# Patient Record
Sex: MALE | Race: WHITE | NOT HISPANIC OR LATINO | Employment: OTHER | URBAN - METROPOLITAN AREA
[De-identification: names, ages, dates, MRNs, and addresses within clinical notes are randomized per-mention and may not be internally consistent; named-entity substitution may affect disease eponyms.]

---

## 2023-03-11 PROBLEM — E88.810 INSULIN RESISTANCE SYNDROME: Status: ACTIVE | Noted: 2023-03-11

## 2023-03-11 PROBLEM — E55.9 VITAMIN D DEFICIENCY: Status: ACTIVE | Noted: 2023-03-11

## 2023-03-11 PROBLEM — F39 MOOD DISORDER (CMS-HCC): Status: ACTIVE | Noted: 2023-03-11

## 2023-03-11 PROBLEM — G40.909 SEIZURE DISORDER (MULTI): Status: ACTIVE | Noted: 2023-03-11

## 2023-03-11 PROBLEM — F79 ACQUIRED INTELLECTUAL DISABILITY: Status: ACTIVE | Noted: 2023-03-11

## 2023-03-11 PROBLEM — Z93.2 ILEOSTOMY IN PLACE (MULTI): Status: ACTIVE | Noted: 2023-03-11

## 2023-03-11 PROBLEM — K59.2 NEUROGENIC BOWEL: Status: ACTIVE | Noted: 2023-03-11

## 2023-03-11 PROBLEM — R53.81 PHYSICAL DEBILITY: Status: ACTIVE | Noted: 2023-03-11

## 2023-03-11 PROBLEM — F29: Status: ACTIVE | Noted: 2023-03-11

## 2023-03-11 PROBLEM — Q07.9: Status: ACTIVE | Noted: 2023-03-11

## 2023-03-11 PROBLEM — Z93.59 SUPRAPUBIC CATHETER (MULTI): Status: ACTIVE | Noted: 2023-03-11

## 2023-03-11 PROBLEM — E66.9 OBESITY: Status: ACTIVE | Noted: 2023-03-11

## 2023-03-11 PROBLEM — K02.9 COMPLEX DENTAL CARIES: Status: ACTIVE | Noted: 2023-03-11

## 2023-03-11 PROBLEM — N31.9 NEUROGENIC BLADDER: Status: ACTIVE | Noted: 2023-03-11

## 2023-03-11 PROBLEM — F41.9 ANXIETY: Status: ACTIVE | Noted: 2023-03-11

## 2023-03-21 ENCOUNTER — NURSING HOME VISIT (OUTPATIENT)
Dept: POST ACUTE CARE | Facility: EXTERNAL LOCATION | Age: 53
End: 2023-03-21
Payer: MEDICARE

## 2023-03-21 DIAGNOSIS — G40.909 SEIZURE DISORDER (MULTI): ICD-10-CM

## 2023-03-21 DIAGNOSIS — F39 MOOD DISORDER (CMS-HCC): ICD-10-CM

## 2023-03-21 DIAGNOSIS — N39.0 URINARY TRACT INFECTION ASSOCIATED WITH CATHETERIZATION OF URINARY TRACT, UNSPECIFIED INDWELLING URINARY CATHETER TYPE, SUBSEQUENT ENCOUNTER: Primary | ICD-10-CM

## 2023-03-21 DIAGNOSIS — T83.511D URINARY TRACT INFECTION ASSOCIATED WITH CATHETERIZATION OF URINARY TRACT, UNSPECIFIED INDWELLING URINARY CATHETER TYPE, SUBSEQUENT ENCOUNTER: Primary | ICD-10-CM

## 2023-03-21 DIAGNOSIS — N31.9 NEUROGENIC BLADDER: ICD-10-CM

## 2023-03-21 DIAGNOSIS — F29 UNSP PSYCHOSIS NOT DUE TO A SUBSTANCE OR KNOWN PHYSIOL COND (MULTI): ICD-10-CM

## 2023-03-21 PROBLEM — T83.511A URINARY TRACT INFECTION ASSOCIATED WITH CATHETERIZATION OF URINARY TRACT (CMS-HCC): Status: ACTIVE | Noted: 2023-03-21

## 2023-03-21 PROCEDURE — 99309 SBSQ NF CARE MODERATE MDM 30: CPT | Performed by: NURSE PRACTITIONER

## 2023-03-21 ASSESSMENT — ENCOUNTER SYMPTOMS
DIFFICULTY URINATING: 0
SHORTNESS OF BREATH: 0
COUGH: 0
FEVER: 0
CONSTIPATION: 0
PALPITATIONS: 0
FATIGUE: 0
NAUSEA: 0
VOMITING: 0
CHILLS: 0
ABDOMINAL PAIN: 0
DIARRHEA: 0

## 2023-03-21 NOTE — LETTER
Patient: Nato Downs  : 1970    Encounter Date: 2023    Subjective  Nato Downs is a 52 y.o. male Here for routine monthly visit and follow up for UTI.   HPI Multiple health problems have been reviewed.  The course has been unchanged.  The patient  is moderately confused.   There are no family members present during time of visit.    he  denies any complaints when asked.  Eating and drinking well.   No concerns per staff.   Grzegorz was having increased behaviors and labs and urine were checked.   Keppra level was elevated, keppra dose was decreased.    Urine showed proteus and klebsiella, both organisms sensitive to cipro and this was started.  Per staff the behaviors have resolved since starting cipro.       Review of Systems   Constitutional:  Negative for chills, fatigue and fever.   Respiratory:  Negative for cough and shortness of breath.    Cardiovascular:  Negative for chest pain and palpitations.   Gastrointestinal:  Negative for abdominal pain, constipation, diarrhea, nausea and vomiting.   Genitourinary:  Negative for difficulty urinating.       Objective  There were no vitals taken for this visit.    Physical Exam  Constitutional:       General: He is not in acute distress.     Appearance: He is obese.   HENT:      Head: Normocephalic and atraumatic.   Eyes:      Conjunctiva/sclera: Conjunctivae normal.   Cardiovascular:      Rate and Rhythm: Normal rate and regular rhythm.   Pulmonary:      Effort: Pulmonary effort is normal. No respiratory distress.      Breath sounds: Normal breath sounds.   Abdominal:      General: Bowel sounds are normal. There is no distension.      Palpations: Abdomen is soft.      Tenderness: There is no abdominal tenderness.      Comments: Ostomy draining liquid brown   Genitourinary:     Comments: Suprapubic cath draining clear yellow  Musculoskeletal:         General: Normal range of motion.      Right lower leg: No edema.      Left lower leg: No edema.    Skin:     General: Skin is warm and dry.   Neurological:      General: No focal deficit present.      Mental Status: He is alert. Mental status is at baseline.   Psychiatric:         Mood and Affect: Mood normal.         Behavior: Behavior normal.         Assessment/Plan  Problem List Items Addressed This Visit          Nervous    Seizure disorder (CMS/[facility])     no known recent seizures.  Staff to monitor and notify for any seizure activity              Genitourinary    Neurogenic bladder     Suprapubic cath.           Urinary tract infection associated with catheterization of urinary tract (CMS/[facility]) - Primary     Klebsiella and proteus.  Sensitive to cipro.  Urien was checked due to increased behaviors, since cipro has started per staff the behaviors have resolved.  Staff to monitor and notify for any recurrence of symptoms.              Other    Mood disorder (CMS/[facility])     No recurrence of behaviors.          Unsp psychosis not due to a substance or known physiol cond (CMS/[facility])   labs/meds/orders reviewed  staff to monitor and notify for any changes.  Continue with cipro to complete course  Keppra level to be drawn 3/27  Next full labs 9/23 and prn      Electronically Signed By: CHARLA Larios-CNP   3/21/23  1:54 PM

## 2023-03-21 NOTE — PROGRESS NOTES
Subjective   Nato Downs is a 52 y.o. male Here for routine monthly visit and follow up for UTI.   HPI Multiple health problems have been reviewed.  The course has been unchanged.  The patient  is moderately confused.   There are no family members present during time of visit.    he  denies any complaints when asked.  Eating and drinking well.   No concerns per staff.   Grzegorz was having increased behaviors and labs and urine were checked.   Keppra level was elevated, keppra dose was decreased.    Urine showed proteus and klebsiella, both organisms sensitive to cipro and this was started.  Per staff the behaviors have resolved since starting cipro.       Review of Systems   Constitutional:  Negative for chills, fatigue and fever.   Respiratory:  Negative for cough and shortness of breath.    Cardiovascular:  Negative for chest pain and palpitations.   Gastrointestinal:  Negative for abdominal pain, constipation, diarrhea, nausea and vomiting.   Genitourinary:  Negative for difficulty urinating.       Objective   There were no vitals taken for this visit.    Physical Exam  Constitutional:       General: He is not in acute distress.     Appearance: He is obese.   HENT:      Head: Normocephalic and atraumatic.   Eyes:      Conjunctiva/sclera: Conjunctivae normal.   Cardiovascular:      Rate and Rhythm: Normal rate and regular rhythm.   Pulmonary:      Effort: Pulmonary effort is normal. No respiratory distress.      Breath sounds: Normal breath sounds.   Abdominal:      General: Bowel sounds are normal. There is no distension.      Palpations: Abdomen is soft.      Tenderness: There is no abdominal tenderness.      Comments: Ostomy draining liquid brown   Genitourinary:     Comments: Suprapubic cath draining clear yellow  Musculoskeletal:         General: Normal range of motion.      Right lower leg: No edema.      Left lower leg: No edema.   Skin:     General: Skin is warm and dry.   Neurological:      General: No  focal deficit present.      Mental Status: He is alert. Mental status is at baseline.   Psychiatric:         Mood and Affect: Mood normal.         Behavior: Behavior normal.         Assessment/Plan   Problem List Items Addressed This Visit          Nervous    Seizure disorder (CMS/[facility])     no known recent seizures.  Staff to monitor and notify for any seizure activity              Genitourinary    Neurogenic bladder     Suprapubic cath.           Urinary tract infection associated with catheterization of urinary tract (CMS/[facility]) - Primary     Klebsiella and proteus.  Sensitive to cipro.  Urien was checked due to increased behaviors, since cipro has started per staff the behaviors have resolved.  Staff to monitor and notify for any recurrence of symptoms.              Other    Mood disorder (CMS/[facility])     No recurrence of behaviors.          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  Continue with cipro to complete course  Keppra level to be drawn 3/27  Next full labs 9/23 and prn

## 2023-03-21 NOTE — ASSESSMENT & PLAN NOTE
Klebsiella and proteus.  Sensitive to cipro.  Urien was checked due to increased behaviors, since cipro has started per staff the behaviors have resolved.  Staff to monitor and notify for any recurrence of symptoms.

## 2023-04-21 ENCOUNTER — NURSING HOME VISIT (OUTPATIENT)
Dept: POST ACUTE CARE | Facility: EXTERNAL LOCATION | Age: 53
End: 2023-04-21
Payer: MEDICARE

## 2023-04-21 VITALS
HEART RATE: 92 BPM | RESPIRATION RATE: 18 BRPM | OXYGEN SATURATION: 96 % | TEMPERATURE: 98.2 F | WEIGHT: 273 LBS | DIASTOLIC BLOOD PRESSURE: 74 MMHG | SYSTOLIC BLOOD PRESSURE: 130 MMHG

## 2023-04-21 DIAGNOSIS — F79 ACQUIRED INTELLECTUAL DISABILITY: ICD-10-CM

## 2023-04-21 DIAGNOSIS — G40.909 SEIZURE DISORDER (MULTI): ICD-10-CM

## 2023-04-21 DIAGNOSIS — E66.9 OBESITY, UNSPECIFIED CLASSIFICATION, UNSPECIFIED OBESITY TYPE, UNSPECIFIED WHETHER SERIOUS COMORBIDITY PRESENT: ICD-10-CM

## 2023-04-21 DIAGNOSIS — N39.0 URINARY TRACT INFECTION ASSOCIATED WITH CATHETERIZATION OF URINARY TRACT, UNSPECIFIED INDWELLING URINARY CATHETER TYPE, SUBSEQUENT ENCOUNTER: Primary | ICD-10-CM

## 2023-04-21 DIAGNOSIS — Q07.9 CONGENITAL ENCEPHALOPATHY (MULTI): ICD-10-CM

## 2023-04-21 DIAGNOSIS — T83.511D URINARY TRACT INFECTION ASSOCIATED WITH CATHETERIZATION OF URINARY TRACT, UNSPECIFIED INDWELLING URINARY CATHETER TYPE, SUBSEQUENT ENCOUNTER: Primary | ICD-10-CM

## 2023-04-21 DIAGNOSIS — R53.81 PHYSICAL DEBILITY: ICD-10-CM

## 2023-04-21 PROCEDURE — 99309 SBSQ NF CARE MODERATE MDM 30: CPT | Performed by: INTERNAL MEDICINE

## 2023-04-21 NOTE — PROGRESS NOTES
Subjective   Patient ID: Nato Downs is a 52 y.o. male who is long term resident being seen and evaluated for multiple medical problems.    HPI   This 52-year-old male patient is sitting up in the dining room watching television today.  He has no complaints of pain or shortness of breath.  He was treated with Cipro for polymicrobial urinary tract infection in March 2023.  The patient is also had his Keppra dose decreased due to elevated levels noted on lab work.  No seizure has been reported or appreciated.  Nursing has no new adverse events to her.    Current high risk medication:  Clonazepam  Ditropan  Keppra  Risperidone  Trileptal    Laboratory examination from March 2023:  Keppra 27 which is decreased from 58 on March 20, 2023  Potassium 4.1  Bicarbonate 27  Creatinine 1.0  Hemoglobin 12.4  Trileptal 25    Review of Systems   Constitutional:  Negative for chills, diaphoresis and fever.   Respiratory:  Negative for cough and shortness of breath.    Cardiovascular:  Negative for chest pain and leg swelling.   Gastrointestinal:  Negative for constipation, diarrhea, nausea and vomiting.   Musculoskeletal:  Negative for joint swelling and myalgias.       Objective   /74   Pulse 92   Temp 36.8 °C (98.2 °F)   Resp 18   Wt 124 kg (273 lb)   SpO2 96%     Physical Exam  Vitals reviewed.   Constitutional:       General: He is not in acute distress.     Appearance: He is obese. He is not ill-appearing.   Cardiovascular:      Rate and Rhythm: Normal rate and regular rhythm.      Pulses: Normal pulses.      Heart sounds:      No gallop.   Pulmonary:      Breath sounds: Normal breath sounds. No wheezing, rhonchi or rales.   Abdominal:      General: Abdomen is flat. Bowel sounds are normal.      Palpations: Abdomen is soft.      Tenderness: There is no guarding or rebound.   Musculoskeletal:      Right lower leg: No edema.      Left lower leg: No edema.   Neurological:      Mental Status: Mental status is at  baseline.      Comments: The patient is developmentally delayed, but in a stable and baseline state at this time.         Assessment/Plan   Problem List Items Addressed This Visit          Nervous    Congenital encephalopathy (CMS/HCC)    Seizure disorder (CMS/HCC)       Genitourinary    Urinary tract infection associated with catheterization of urinary tract (CMS/HCC) - Primary       Endocrine/Metabolic    Obesity       Other    Acquired intellectual disability    Physical debility     A.  We will continue with restorative and supportive care as the patient tolerates    B.  We will monitor the patient for ongoing signs and symptoms of urinary tract infection given the patient's suprapubic catheter    C.  Laboratory examinations will next be due in September 2023 or as needed    E.  The patient's prognosis is guarded.

## 2023-04-21 NOTE — LETTER
Patient: Nato Downs  : 1970    Encounter Date: 2023    Subjective  Patient ID: Nato Downs is a 52 y.o. male who is long term resident being seen and evaluated for multiple medical problems.    HPI   This 52-year-old male patient is sitting up in the dining room watching television today.  He has no complaints of pain or shortness of breath.  He was treated with Cipro for polymicrobial urinary tract infection in 2023.  The patient is also had his Keppra dose decreased due to elevated levels noted on lab work.  No seizure has been reported or appreciated.  Nursing has no new adverse events to her.    Current high risk medication:  Clonazepam  Ditropan  Keppra  Risperidone  Trileptal    Laboratory examination from 2023:  Keppra 27 which is decreased from 58 on 2023  Potassium 4.1  Bicarbonate 27  Creatinine 1.0  Hemoglobin 12.4  Trileptal 25    Review of Systems   Constitutional:  Negative for chills, diaphoresis and fever.   Respiratory:  Negative for cough and shortness of breath.    Cardiovascular:  Negative for chest pain and leg swelling.   Gastrointestinal:  Negative for constipation, diarrhea, nausea and vomiting.   Musculoskeletal:  Negative for joint swelling and myalgias.       Objective  /74   Pulse 92   Temp 36.8 °C (98.2 °F)   Resp 18   Wt 124 kg (273 lb)   SpO2 96%     Physical Exam  Vitals reviewed.   Constitutional:       General: He is not in acute distress.     Appearance: He is obese. He is not ill-appearing.   Cardiovascular:      Rate and Rhythm: Normal rate and regular rhythm.      Pulses: Normal pulses.      Heart sounds:      No gallop.   Pulmonary:      Breath sounds: Normal breath sounds. No wheezing, rhonchi or rales.   Abdominal:      General: Abdomen is flat. Bowel sounds are normal.      Palpations: Abdomen is soft.      Tenderness: There is no guarding or rebound.   Musculoskeletal:      Right lower leg: No edema.      Left lower leg:  No edema.   Neurological:      Mental Status: Mental status is at baseline.      Comments: The patient is developmentally delayed, but in a stable and baseline state at this time.         Assessment/Plan  Problem List Items Addressed This Visit          Nervous    Congenital encephalopathy (CMS/HCC)    Seizure disorder (CMS/HCC)       Genitourinary    Urinary tract infection associated with catheterization of urinary tract (CMS/HCC) - Primary       Endocrine/Metabolic    Obesity       Other    Acquired intellectual disability    Physical debility     A.  We will continue with restorative and supportive care as the patient tolerates    B.  We will monitor the patient for ongoing signs and symptoms of urinary tract infection given the patient's suprapubic catheter    C.  Laboratory examinations will next be due in September 2023 or as needed    E.  The patient's prognosis is guarded.          Electronically Signed By: Amilcar Quezada MD   4/24/23  3:33 PM

## 2023-04-24 ASSESSMENT — ENCOUNTER SYMPTOMS
MYALGIAS: 0
CHILLS: 0
SHORTNESS OF BREATH: 0
JOINT SWELLING: 0
DIAPHORESIS: 0
FEVER: 0
VOMITING: 0
CONSTIPATION: 0
DIARRHEA: 0
NAUSEA: 0
COUGH: 0

## 2023-05-26 ENCOUNTER — NURSING HOME VISIT (OUTPATIENT)
Dept: POST ACUTE CARE | Facility: EXTERNAL LOCATION | Age: 53
End: 2023-05-26
Payer: MEDICARE

## 2023-05-26 DIAGNOSIS — G40.909 SEIZURE DISORDER (MULTI): Primary | ICD-10-CM

## 2023-05-26 DIAGNOSIS — Q07.9 CONGENITAL ENCEPHALOPATHY (MULTI): ICD-10-CM

## 2023-05-26 DIAGNOSIS — Z93.2 ILEOSTOMY IN PLACE (MULTI): ICD-10-CM

## 2023-05-26 DIAGNOSIS — Z93.59 SUPRAPUBIC CATHETER (MULTI): ICD-10-CM

## 2023-05-26 DIAGNOSIS — E66.9 OBESITY, UNSPECIFIED CLASSIFICATION, UNSPECIFIED OBESITY TYPE, UNSPECIFIED WHETHER SERIOUS COMORBIDITY PRESENT: ICD-10-CM

## 2023-05-26 PROCEDURE — 99308 SBSQ NF CARE LOW MDM 20: CPT | Performed by: INTERNAL MEDICINE

## 2023-05-26 NOTE — LETTER
Patient: Nato Downs  : 1970    Encounter Date: 2023    Subjective  Patient ID: Nato Downs is a 52 y.o. male who is long term resident being seen and evaluated for multiple medical problems.    HPI   This 52-year-old male patient is sitting up watching television in no distress.  He has no complaints of pain or shortness of breath me.  Nursing has no new adverse events reported to me.    Current high risk medication:  Clonazepam  Diastat  Ditropan  Keppra  Trileptal  Risperidone    Laboratory examination from 2023:  Keppra 22  Potassium 4.1  Bicarbonate 27  Creatinine 1.0  Hemoglobin 12.4  Trileptal 25    Review of Systems   Constitutional:  Negative for chills, diaphoresis and fever.   Respiratory:  Negative for cough and shortness of breath.    Cardiovascular:  Negative for chest pain and leg swelling.   Gastrointestinal:  Negative for constipation, diarrhea, nausea and vomiting.   Musculoskeletal:  Negative for joint swelling and myalgias.       Objective  /80   Pulse 92   Temp 36.7 °C (98 °F)   Resp 18   Wt 125 kg (276 lb)   SpO2 98%     Physical Exam  Vitals reviewed.   Constitutional:       General: He is not in acute distress.     Appearance: He is obese. He is not ill-appearing.   Cardiovascular:      Rate and Rhythm: Normal rate and regular rhythm.      Pulses: Normal pulses.      Heart sounds:      No gallop.   Pulmonary:      Breath sounds: Normal breath sounds. No wheezing, rhonchi or rales.   Abdominal:      General: Abdomen is flat. Bowel sounds are normal.      Palpations: Abdomen is soft.      Tenderness: There is no guarding or rebound.   Musculoskeletal:      Right lower leg: No edema.      Left lower leg: No edema.   Neurological:      Mental Status: Mental status is at baseline.      Comments: The patient is developmentally delayed, but in a stable and baseline state at this time.         Assessment/Plan  Problem List Items Addressed This Visit          Nervous     Congenital encephalopathy (CMS/HCC)    Seizure disorder (CMS/HCC) - Primary       Digestive    Ileostomy in place (CMS/HCC)       Genitourinary    Suprapubic catheter (CMS/HCC)       Endocrine/Metabolic    Obesity         A.  We will continue with restorative and supportive care as patient tolerates    B.  Laboratory examinations will next be due in September 2023 or as needed    C.  The patient's prognosis is fair-2-guarded.      Electronically Signed By: Amilcar Quezada MD   6/5/23  5:28 PM

## 2023-05-30 ENCOUNTER — NURSING HOME VISIT (OUTPATIENT)
Dept: POST ACUTE CARE | Facility: EXTERNAL LOCATION | Age: 53
End: 2023-05-30
Payer: MEDICARE

## 2023-05-30 VITALS
WEIGHT: 276 LBS | OXYGEN SATURATION: 96 % | SYSTOLIC BLOOD PRESSURE: 132 MMHG | RESPIRATION RATE: 18 BRPM | HEART RATE: 92 BPM | TEMPERATURE: 97.9 F | DIASTOLIC BLOOD PRESSURE: 80 MMHG

## 2023-05-30 VITALS
OXYGEN SATURATION: 98 % | HEART RATE: 92 BPM | WEIGHT: 276 LBS | RESPIRATION RATE: 18 BRPM | SYSTOLIC BLOOD PRESSURE: 132 MMHG | TEMPERATURE: 98 F | DIASTOLIC BLOOD PRESSURE: 80 MMHG

## 2023-05-30 DIAGNOSIS — G40.909 SEIZURE DISORDER (MULTI): ICD-10-CM

## 2023-05-30 DIAGNOSIS — N31.9 NEUROGENIC BLADDER: Primary | ICD-10-CM

## 2023-05-30 DIAGNOSIS — F29 UNSP PSYCHOSIS NOT DUE TO A SUBSTANCE OR KNOWN PHYSIOL COND (MULTI): ICD-10-CM

## 2023-05-30 DIAGNOSIS — E55.9 VITAMIN D DEFICIENCY: ICD-10-CM

## 2023-05-30 DIAGNOSIS — Z93.2 ILEOSTOMY IN PLACE (MULTI): ICD-10-CM

## 2023-05-30 PROCEDURE — 99309 SBSQ NF CARE MODERATE MDM 30: CPT | Performed by: NURSE PRACTITIONER

## 2023-05-30 ASSESSMENT — ENCOUNTER SYMPTOMS
PALPITATIONS: 0
FEVER: 0
DIFFICULTY URINATING: 0
DIARRHEA: 0
CONSTIPATION: 0
CHILLS: 0
ABDOMINAL PAIN: 0
FATIGUE: 0
NAUSEA: 0
VOMITING: 0
SHORTNESS OF BREATH: 0
COUGH: 0

## 2023-05-30 NOTE — LETTER
Patient: Nato Downs  : 1970    Encounter Date: 2023    Subjective  Nato Downs is a 52 y.o. male Here for routine monthly visit.   HPI Multiple health problems have been reviewed.  The course has been unchanged.  The patient  is moderately confused.   There are no family members present during time of visit.    he  denies any complaints when asked.  Eating and drinking well.   No concerns per staff.        Review of Systems   Constitutional:  Negative for chills, fatigue and fever.   Respiratory:  Negative for cough and shortness of breath.    Cardiovascular:  Negative for chest pain and palpitations.   Gastrointestinal:  Negative for abdominal pain, constipation, diarrhea, nausea and vomiting.   Genitourinary:  Negative for difficulty urinating.       Objective  /80   Pulse 92   Temp 36.6 °C (97.9 °F)   Resp 18   Wt 125 kg (276 lb)   SpO2 96%     Physical Exam  Constitutional:       General: He is not in acute distress.     Appearance: He is obese.   HENT:      Head: Normocephalic and atraumatic.   Eyes:      Conjunctiva/sclera: Conjunctivae normal.   Cardiovascular:      Rate and Rhythm: Normal rate and regular rhythm.   Pulmonary:      Effort: Pulmonary effort is normal. No respiratory distress.      Breath sounds: Normal breath sounds.   Abdominal:      General: Bowel sounds are normal. There is no distension.      Palpations: Abdomen is soft.      Tenderness: There is no abdominal tenderness.      Comments: Ostomy draining liquid brown   Genitourinary:     Comments: Suprapubic cath draining clear yellow  Musculoskeletal:         General: Normal range of motion.      Right lower leg: No edema.      Left lower leg: No edema.   Skin:     General: Skin is warm and dry.   Neurological:      General: No focal deficit present.      Mental Status: He is alert. Mental status is at baseline.   Psychiatric:         Mood and Affect: Mood normal.         Behavior: Behavior normal.          Assessment/Plan  Problem List Items Addressed This Visit          Nervous    Seizure disorder (CMS/HCC)     no known recent seizures.  Staff to monitor and notify for any seizure activity              Digestive    Ileostomy in place (CMS/[facility])     Functioning well.              Genitourinary    Neurogenic bladder - Primary     Suprapubic cath.              Endocrine/Metabolic    Vitamin D deficiency     monitor with routine labs.              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  Next full labs 9/23 and prn      Electronically Signed By: CHARLA Larios-CNP   5/30/23 11:10 AM

## 2023-05-30 NOTE — PROGRESS NOTES
Subjective   Patient ID: Nato Downs is a 52 y.o. male who is long term resident being seen and evaluated for multiple medical problems.    HPI   This 52-year-old male patient is sitting up watching television in no distress.  He has no complaints of pain or shortness of breath me.  Nursing has no new adverse events reported to me.    Current high risk medication:  Clonazepam  Diastat  Ditropan  Keppra  Trileptal  Risperidone    Laboratory examination from March 2023:  Keppra 22  Potassium 4.1  Bicarbonate 27  Creatinine 1.0  Hemoglobin 12.4  Trileptal 25    Review of Systems   Constitutional:  Negative for chills, diaphoresis and fever.   Respiratory:  Negative for cough and shortness of breath.    Cardiovascular:  Negative for chest pain and leg swelling.   Gastrointestinal:  Negative for constipation, diarrhea, nausea and vomiting.   Musculoskeletal:  Negative for joint swelling and myalgias.       Objective   /80   Pulse 92   Temp 36.7 °C (98 °F)   Resp 18   Wt 125 kg (276 lb)   SpO2 98%     Physical Exam  Vitals reviewed.   Constitutional:       General: He is not in acute distress.     Appearance: He is obese. He is not ill-appearing.   Cardiovascular:      Rate and Rhythm: Normal rate and regular rhythm.      Pulses: Normal pulses.      Heart sounds:      No gallop.   Pulmonary:      Breath sounds: Normal breath sounds. No wheezing, rhonchi or rales.   Abdominal:      General: Abdomen is flat. Bowel sounds are normal.      Palpations: Abdomen is soft.      Tenderness: There is no guarding or rebound.   Musculoskeletal:      Right lower leg: No edema.      Left lower leg: No edema.   Neurological:      Mental Status: Mental status is at baseline.      Comments: The patient is developmentally delayed, but in a stable and baseline state at this time.         Assessment/Plan   Problem List Items Addressed This Visit          Nervous    Congenital encephalopathy (CMS/HCC)    Seizure disorder (CMS/HCC)  - Primary       Digestive    Ileostomy in place (CMS/HCC)       Genitourinary    Suprapubic catheter (CMS/HCC)       Endocrine/Metabolic    Obesity         A.  We will continue with restorative and supportive care as patient tolerates    B.  Laboratory examinations will next be due in September 2023 or as needed    C.  The patient's prognosis is fair-2-guarded.

## 2023-05-30 NOTE — ASSESSMENT & PLAN NOTE
no known recent seizures.  Staff to monitor and notify for any seizure activity. Keppra level on repeat testing after dosage adjustment now normal

## 2023-05-30 NOTE — PROGRESS NOTES
Subjective   Nato Downs is a 52 y.o. male Here for routine monthly visit.   HPI Multiple health problems have been reviewed.  The course has been unchanged.  The patient  is moderately confused.   There are no family members present during time of visit.    he  denies any complaints when asked.  Eating and drinking well.   No concerns per staff.        Review of Systems   Constitutional:  Negative for chills, fatigue and fever.   Respiratory:  Negative for cough and shortness of breath.    Cardiovascular:  Negative for chest pain and palpitations.   Gastrointestinal:  Negative for abdominal pain, constipation, diarrhea, nausea and vomiting.   Genitourinary:  Negative for difficulty urinating.       Objective   /80   Pulse 92   Temp 36.6 °C (97.9 °F)   Resp 18   Wt 125 kg (276 lb)   SpO2 96%     Physical Exam  Constitutional:       General: He is not in acute distress.     Appearance: He is obese.   HENT:      Head: Normocephalic and atraumatic.   Eyes:      Conjunctiva/sclera: Conjunctivae normal.   Cardiovascular:      Rate and Rhythm: Normal rate and regular rhythm.   Pulmonary:      Effort: Pulmonary effort is normal. No respiratory distress.      Breath sounds: Normal breath sounds.   Abdominal:      General: Bowel sounds are normal. There is no distension.      Palpations: Abdomen is soft.      Tenderness: There is no abdominal tenderness.      Comments: Ostomy draining liquid brown   Genitourinary:     Comments: Suprapubic cath draining clear yellow  Musculoskeletal:         General: Normal range of motion.      Right lower leg: No edema.      Left lower leg: No edema.   Skin:     General: Skin is warm and dry.   Neurological:      General: No focal deficit present.      Mental Status: He is alert. Mental status is at baseline.   Psychiatric:         Mood and Affect: Mood normal.         Behavior: Behavior normal.         Assessment/Plan   Problem List Items Addressed This Visit          Nervous     Seizure disorder (CMS/[facility])     no known recent seizures.  Staff to monitor and notify for any seizure activity              Digestive    Ileostomy in place (CMS/[facility])     Functioning well.              Genitourinary    Neurogenic bladder - Primary     Suprapubic cath.              Endocrine/Metabolic    Vitamin D deficiency     monitor with routine labs.              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  Next full labs 9/23 and prn

## 2023-06-05 ASSESSMENT — ENCOUNTER SYMPTOMS
JOINT SWELLING: 0
NAUSEA: 0
CONSTIPATION: 0
VOMITING: 0
DIAPHORESIS: 0
SHORTNESS OF BREATH: 0
FEVER: 0
CHILLS: 0
COUGH: 0
DIARRHEA: 0
MYALGIAS: 0

## 2023-06-13 DIAGNOSIS — F41.9 ANXIETY: Primary | ICD-10-CM

## 2023-06-13 RX ORDER — CLONAZEPAM 0.5 MG/1
0.5 TABLET ORAL 2 TIMES DAILY
Qty: 60 TABLET | Refills: 5 | Status: SHIPPED | OUTPATIENT
Start: 2023-06-13 | End: 2023-11-14 | Stop reason: SDUPTHER

## 2023-06-13 RX ORDER — CLONAZEPAM 0.5 MG/1
1 TABLET ORAL 2 TIMES DAILY
COMMUNITY
Start: 2022-03-04 | End: 2023-06-13 | Stop reason: SDUPTHER

## 2023-06-16 ENCOUNTER — NURSING HOME VISIT (OUTPATIENT)
Dept: POST ACUTE CARE | Facility: EXTERNAL LOCATION | Age: 53
End: 2023-06-16
Payer: MEDICARE

## 2023-06-16 DIAGNOSIS — Q07.9 CONGENITAL ENCEPHALOPATHY (MULTI): ICD-10-CM

## 2023-06-16 DIAGNOSIS — R53.81 PHYSICAL DEBILITY: ICD-10-CM

## 2023-06-16 DIAGNOSIS — Z93.2 ILEOSTOMY IN PLACE (MULTI): ICD-10-CM

## 2023-06-16 DIAGNOSIS — Z93.59 SUPRAPUBIC CATHETER (MULTI): ICD-10-CM

## 2023-06-16 DIAGNOSIS — G40.909 SEIZURE DISORDER (MULTI): Primary | ICD-10-CM

## 2023-06-16 PROCEDURE — 99309 SBSQ NF CARE MODERATE MDM 30: CPT | Performed by: INTERNAL MEDICINE

## 2023-06-16 NOTE — LETTER
Patient: Nato Downs  : 1970    Encounter Date: 2023    Subjective  Patient ID: Nato Downs is a 52 y.o. male who is long term resident being seen and evaluated for multiple medical problems.    HPI   This 52-year-old male patient is resting comfortably in his room in no distress.  He has no complaints of pain or shortness of breath.  Nursing has no new adverse events reported to me.    Current high risk medication:  Klonopin  Ditropan  Keppra  Risperidone  Trileptal    Laboratory examination from 2023:  Keppra 22 decreased from 58  Potassium 4.1  Bicarbonate 27  Creatinine 1.0  Hemoglobin 12.4  Trileptal 25    Review of Systems   Constitutional:  Negative for chills, diaphoresis and fever.   Respiratory:  Negative for cough and shortness of breath.    Cardiovascular:  Negative for chest pain and leg swelling.   Gastrointestinal:  Negative for constipation, diarrhea, nausea and vomiting.   Musculoskeletal:  Negative for joint swelling and myalgias.       Objective  /78   Pulse 84   Temp 36.7 °C (98 °F)   Resp 18   Wt 125 kg (276 lb)   SpO2 98%     Physical Exam  Constitutional:       General: He is not in acute distress.     Appearance: He is obese.   HENT:      Head: Normocephalic and atraumatic.   Eyes:      Conjunctiva/sclera: Conjunctivae normal.   Cardiovascular:      Rate and Rhythm: Normal rate and regular rhythm.   Pulmonary:      Effort: Pulmonary effort is normal. No respiratory distress.      Breath sounds: Normal breath sounds.   Abdominal:      General: Bowel sounds are normal. There is no distension.      Palpations: Abdomen is soft.      Tenderness: There is no abdominal tenderness.      Comments: Ostomy draining liquid brown   Genitourinary:     Comments: Suprapubic cath draining clear yellow  Musculoskeletal:         General: Normal range of motion.      Right lower leg: No edema.      Left lower leg: No edema.   Skin:     General: Skin is warm and dry.    Neurological:      General: No focal deficit present.      Mental Status: He is alert. Mental status is at baseline.   Psychiatric:         Mood and Affect: Mood normal.         Behavior: Behavior normal.         Assessment/Plan  Problem List Items Addressed This Visit       Ileostomy in place (CMS/HCC)    Physical debility    Congenital encephalopathy (CMS/HCC)    Seizure disorder (CMS/HCC) - Primary    Suprapubic catheter (CMS/HCC)       A.  We will continue with restorative and supportive care as patient tolerates    B.  The patient's controlled substance have been electronically scripted    C.  Laboratory examinations will next be due in September 2023 or as needed    D.  The patient's prognosis is fair-2-guarded.      Electronically Signed By: Amilcar Quezada MD   6/28/23  4:13 PM

## 2023-06-19 VITALS
WEIGHT: 276 LBS | HEART RATE: 84 BPM | OXYGEN SATURATION: 98 % | SYSTOLIC BLOOD PRESSURE: 136 MMHG | RESPIRATION RATE: 18 BRPM | DIASTOLIC BLOOD PRESSURE: 78 MMHG | TEMPERATURE: 98 F

## 2023-06-19 NOTE — PROGRESS NOTES
Subjective   Patient ID: Nato Downs is a 52 y.o. male who is long term resident being seen and evaluated for multiple medical problems.    HPI   This 52-year-old male patient is resting comfortably in his room in no distress.  He has no complaints of pain or shortness of breath.  Nursing has no new adverse events reported to me.    Current high risk medication:  Klonopin  Ditropan  Keppra  Risperidone  Trileptal    Laboratory examination from March 2023:  Keppra 22 decreased from 58  Potassium 4.1  Bicarbonate 27  Creatinine 1.0  Hemoglobin 12.4  Trileptal 25    Review of Systems   Constitutional:  Negative for chills, diaphoresis and fever.   Respiratory:  Negative for cough and shortness of breath.    Cardiovascular:  Negative for chest pain and leg swelling.   Gastrointestinal:  Negative for constipation, diarrhea, nausea and vomiting.   Musculoskeletal:  Negative for joint swelling and myalgias.       Objective   /78   Pulse 84   Temp 36.7 °C (98 °F)   Resp 18   Wt 125 kg (276 lb)   SpO2 98%     Physical Exam  Constitutional:       General: He is not in acute distress.     Appearance: He is obese.   HENT:      Head: Normocephalic and atraumatic.   Eyes:      Conjunctiva/sclera: Conjunctivae normal.   Cardiovascular:      Rate and Rhythm: Normal rate and regular rhythm.   Pulmonary:      Effort: Pulmonary effort is normal. No respiratory distress.      Breath sounds: Normal breath sounds.   Abdominal:      General: Bowel sounds are normal. There is no distension.      Palpations: Abdomen is soft.      Tenderness: There is no abdominal tenderness.      Comments: Ostomy draining liquid brown   Genitourinary:     Comments: Suprapubic cath draining clear yellow  Musculoskeletal:         General: Normal range of motion.      Right lower leg: No edema.      Left lower leg: No edema.   Skin:     General: Skin is warm and dry.   Neurological:      General: No focal deficit present.      Mental Status: He  is alert. Mental status is at baseline.   Psychiatric:         Mood and Affect: Mood normal.         Behavior: Behavior normal.         Assessment/Plan   Problem List Items Addressed This Visit       Ileostomy in place (CMS/[facility])    Physical debility    Congenital encephalopathy (CMS/HCC)    Seizure disorder (CMS/HCC) - Primary    Suprapubic catheter (CMS/HCC)       A.  We will continue with restorative and supportive care as patient tolerates    B.  The patient's controlled substance have been electronically scripted    C.  Laboratory examinations will next be due in September 2023 or as needed    D.  The patient's prognosis is fair-2-guarded.

## 2023-06-28 ASSESSMENT — ENCOUNTER SYMPTOMS
FEVER: 0
JOINT SWELLING: 0
MYALGIAS: 0
DIARRHEA: 0
CHILLS: 0
VOMITING: 0
SHORTNESS OF BREATH: 0
NAUSEA: 0
CONSTIPATION: 0
COUGH: 0
DIAPHORESIS: 0

## 2023-07-18 ENCOUNTER — NURSING HOME VISIT (OUTPATIENT)
Dept: POST ACUTE CARE | Facility: EXTERNAL LOCATION | Age: 53
End: 2023-07-18
Payer: MEDICARE

## 2023-07-18 VITALS
OXYGEN SATURATION: 96 % | SYSTOLIC BLOOD PRESSURE: 132 MMHG | HEART RATE: 82 BPM | RESPIRATION RATE: 18 BRPM | TEMPERATURE: 98.1 F | DIASTOLIC BLOOD PRESSURE: 76 MMHG | WEIGHT: 272 LBS

## 2023-07-18 DIAGNOSIS — G40.909 SEIZURE DISORDER (MULTI): Primary | ICD-10-CM

## 2023-07-18 DIAGNOSIS — Z93.2 ILEOSTOMY IN PLACE (MULTI): ICD-10-CM

## 2023-07-18 DIAGNOSIS — F29 UNSP PSYCHOSIS NOT DUE TO A SUBSTANCE OR KNOWN PHYSIOL COND (MULTI): ICD-10-CM

## 2023-07-18 DIAGNOSIS — Z93.59 SUPRAPUBIC CATHETER (MULTI): ICD-10-CM

## 2023-07-18 PROCEDURE — 99309 SBSQ NF CARE MODERATE MDM 30: CPT | Performed by: NURSE PRACTITIONER

## 2023-07-18 NOTE — LETTER
Patient: Nato Downs  : 1970    Encounter Date: 2023    Subjective  Nato Downs is a 52 y.o. male Here for routine monthly visit.   HPI Multiple health problems have been reviewed.  The course has been unchanged.  The patient  is moderately confused.   There are no family members present during time of visit.    he  denies any complaints when asked.  Eating and drinking well.   He has been a little more resistant to care, slightly more short tempered but no outbursts or behaviors.    Grzegorz states he is feeling fine, noted to answer a little differently (more short answers, less smiling) than his normal.         Review of Systems   Constitutional:  Negative for chills, fatigue and fever.   Respiratory:  Negative for cough and shortness of breath.    Cardiovascular:  Negative for chest pain and palpitations.   Gastrointestinal:  Negative for abdominal pain, constipation, diarrhea, nausea and vomiting.   Genitourinary:  Negative for difficulty urinating.       Objective  /76   Pulse 82   Temp 36.7 °C (98.1 °F)   Resp 18   Wt 123 kg (272 lb)   SpO2 96%     Physical Exam  Constitutional:       General: He is not in acute distress.     Appearance: He is obese.   HENT:      Head: Normocephalic and atraumatic.   Eyes:      Conjunctiva/sclera: Conjunctivae normal.   Cardiovascular:      Rate and Rhythm: Normal rate and regular rhythm.   Pulmonary:      Effort: Pulmonary effort is normal. No respiratory distress.      Breath sounds: Normal breath sounds.   Abdominal:      General: Bowel sounds are normal. There is no distension.      Palpations: Abdomen is soft.      Tenderness: There is no abdominal tenderness.      Comments: Ostomy draining liquid brown   Genitourinary:     Comments: Suprapubic cath draining clear yellow  Musculoskeletal:         General: Normal range of motion.      Right lower leg: No edema.      Left lower leg: No edema.   Skin:     General: Skin is warm and dry.    Neurological:      General: No focal deficit present.      Mental Status: He is alert. Mental status is at baseline.   Psychiatric:         Mood and Affect: Mood normal.         Behavior: Behavior normal.         Assessment/Plan  Problem List Items Addressed This Visit       Ileostomy in place (CMS/[facility])     Functioning well.           Seizure disorder (CMS/[facility]) - Primary     no known recent seizures.  Staff to monitor and notify for any seizure activity.   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   He has noted to be alittle more short tempered than his normal per staff,  will check labs for any underlying pathology.         labs/meds/orders reviewed  staff to monitor and notify for any changes.  Check labs due to slight behavior change including keppra level.       Electronically Signed By: CHARLA Larios-CNP   7/21/23  2:30 PM

## 2023-07-18 NOTE — PROGRESS NOTES
Subjective   Nato Downs is a 52 y.o. male Here for routine monthly visit.   HPI Multiple health problems have been reviewed.  The course has been unchanged.  The patient  is moderately confused.   There are no family members present during time of visit.    he  denies any complaints when asked.  Eating and drinking well.   He has been a little more resistant to care, slightly more short tempered but no outbursts or behaviors.    Grzegorz states he is feeling fine, noted to answer a little differently (more short answers, less smiling) than his normal.         Review of Systems   Constitutional:  Negative for chills, fatigue and fever.   Respiratory:  Negative for cough and shortness of breath.    Cardiovascular:  Negative for chest pain and palpitations.   Gastrointestinal:  Negative for abdominal pain, constipation, diarrhea, nausea and vomiting.   Genitourinary:  Negative for difficulty urinating.       Objective   /76   Pulse 82   Temp 36.7 °C (98.1 °F)   Resp 18   Wt 123 kg (272 lb)   SpO2 96%     Physical Exam  Constitutional:       General: He is not in acute distress.     Appearance: He is obese.   HENT:      Head: Normocephalic and atraumatic.   Eyes:      Conjunctiva/sclera: Conjunctivae normal.   Cardiovascular:      Rate and Rhythm: Normal rate and regular rhythm.   Pulmonary:      Effort: Pulmonary effort is normal. No respiratory distress.      Breath sounds: Normal breath sounds.   Abdominal:      General: Bowel sounds are normal. There is no distension.      Palpations: Abdomen is soft.      Tenderness: There is no abdominal tenderness.      Comments: Ostomy draining liquid brown   Genitourinary:     Comments: Suprapubic cath draining clear yellow  Musculoskeletal:         General: Normal range of motion.      Right lower leg: No edema.      Left lower leg: No edema.   Skin:     General: Skin is warm and dry.   Neurological:      General: No focal deficit present.      Mental Status: He  is alert. Mental status is at baseline.   Psychiatric:         Mood and Affect: Mood normal.         Behavior: Behavior normal.         Assessment/Plan   Problem List Items Addressed This Visit       Ileostomy in place (CMS/[facility])     Functioning well.           Seizure disorder (CMS/[facility]) - Primary     no known recent seizures.  Staff to monitor and notify for any seizure activity.   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   He has noted to be alittle more short tempered than his normal per staff,  will check labs for any underlying pathology.         labs/meds/orders reviewed  staff to monitor and notify for any changes.  Check labs due to slight behavior change including keppra level.

## 2023-07-21 ENCOUNTER — NURSING HOME VISIT (OUTPATIENT)
Dept: POST ACUTE CARE | Facility: EXTERNAL LOCATION | Age: 53
End: 2023-07-21
Payer: MEDICARE

## 2023-07-21 DIAGNOSIS — R53.81 PHYSICAL DEBILITY: ICD-10-CM

## 2023-07-21 DIAGNOSIS — Q07.9 CONGENITAL ENCEPHALOPATHY (MULTI): ICD-10-CM

## 2023-07-21 DIAGNOSIS — Z93.2 ILEOSTOMY IN PLACE (MULTI): ICD-10-CM

## 2023-07-21 DIAGNOSIS — G40.909 SEIZURE DISORDER (MULTI): Primary | ICD-10-CM

## 2023-07-21 DIAGNOSIS — Z93.59 SUPRAPUBIC CATHETER (MULTI): ICD-10-CM

## 2023-07-21 PROCEDURE — 99308 SBSQ NF CARE LOW MDM 20: CPT | Performed by: INTERNAL MEDICINE

## 2023-07-21 ASSESSMENT — ENCOUNTER SYMPTOMS
NAUSEA: 0
VOMITING: 0
DIARRHEA: 0
FEVER: 0
FATIGUE: 0
COUGH: 0
ABDOMINAL PAIN: 0
SHORTNESS OF BREATH: 0
PALPITATIONS: 0
CHILLS: 0
DIFFICULTY URINATING: 0
CONSTIPATION: 0

## 2023-07-21 NOTE — ASSESSMENT & PLAN NOTE
no known recent seizures.  Staff to monitor and notify for any seizure activity.   Due to slight behavior change (more short tempered, no significant behaviors) will check labs including keppra level

## 2023-07-21 NOTE — LETTER
Patient: Nato Downs  : 1970    Encounter Date: 2023    Subjective  Patient ID: Nato Downs is a 52 y.o. male who is long term resident being seen and evaluated for multiple medical problems.    HPI   This 52-year-old male patient is up and about the facility as per his usual.  He is currently watching television in the common area.  He denies shortness of breath or pain for me and nursing has no new adverse events reported to me at this time.    Current high risk medication:  Clonazepam  Ditropan  Keppra  Risperidone  Trileptal    Laboratory examinations from 2023 are all normal with the exception of a mild anemia with hemoglobin 13.2  Seizure medication levels are pending at this time.    Review of Systems   Constitutional:  Negative for chills, diaphoresis and fever.   Respiratory:  Negative for cough and shortness of breath.    Cardiovascular:  Negative for chest pain and leg swelling.   Gastrointestinal:  Negative for constipation, diarrhea, nausea and vomiting.   Musculoskeletal:  Negative for joint swelling and myalgias.       Objective  /76   Pulse 82   Temp 36.7 °C (98 °F)   Resp 18   Wt 124 kg (273 lb)   SpO2 98%     Physical Exam  Constitutional:       General: He is not in acute distress.     Appearance: He is obese.   HENT:      Head: Normocephalic and atraumatic.   Eyes:      Conjunctiva/sclera: Conjunctivae normal.   Cardiovascular:      Rate and Rhythm: Normal rate and regular rhythm.   Pulmonary:      Effort: Pulmonary effort is normal. No respiratory distress.      Breath sounds: Normal breath sounds.   Abdominal:      General: Bowel sounds are normal. There is no distension.      Palpations: Abdomen is soft.      Tenderness: There is no abdominal tenderness.      Comments: Ostomy draining liquid brown   Genitourinary:     Comments: Suprapubic cath draining clear yellow  Musculoskeletal:         General: Normal range of motion.      Right lower leg: No edema.       Left lower leg: No edema.   Skin:     General: Skin is warm and dry.   Neurological:      General: No focal deficit present.      Mental Status: He is alert. Mental status is at baseline.   Psychiatric:         Mood and Affect: Mood normal.         Behavior: Behavior normal.         Assessment/Plan  Problem List Items Addressed This Visit       Ileostomy in place (CMS/HCC)    Physical debility    Congenital encephalopathy (CMS/HCC)    Seizure disorder (CMS/HCC) - Primary    Suprapubic catheter (CMS/[facility])       A.  We will continue with supportive and restorative care as patient tolerates    B.  Laboratory examinations will next be due in January 2023 or as needed    C.  The patient's prognosis is guarded.      Electronically Signed By: Amilcar Quezada MD   7/31/23  5:24 PM

## 2023-07-21 NOTE — ASSESSMENT & PLAN NOTE
Controlled at present, continue with current medical management.   He has noted to be alittle more short tempered than his normal per staff,  will check labs for any underlying pathology.

## 2023-07-26 VITALS
OXYGEN SATURATION: 98 % | DIASTOLIC BLOOD PRESSURE: 76 MMHG | RESPIRATION RATE: 18 BRPM | TEMPERATURE: 98 F | SYSTOLIC BLOOD PRESSURE: 132 MMHG | WEIGHT: 273 LBS | HEART RATE: 82 BPM

## 2023-07-27 NOTE — PROGRESS NOTES
Subjective   Patient ID: Nato Downs is a 52 y.o. male who is long term resident being seen and evaluated for multiple medical problems.    HPI   This 52-year-old male patient is up and about the facility as per his usual.  He is currently watching television in the common area.  He denies shortness of breath or pain for me and nursing has no new adverse events reported to me at this time.    Current high risk medication:  Clonazepam  Ditropan  Keppra  Risperidone  Trileptal    Laboratory examinations from July 20, 2023 are all normal with the exception of a mild anemia with hemoglobin 13.2  Seizure medication levels are pending at this time.    Review of Systems   Constitutional:  Negative for chills, diaphoresis and fever.   Respiratory:  Negative for cough and shortness of breath.    Cardiovascular:  Negative for chest pain and leg swelling.   Gastrointestinal:  Negative for constipation, diarrhea, nausea and vomiting.   Musculoskeletal:  Negative for joint swelling and myalgias.       Objective   /76   Pulse 82   Temp 36.7 °C (98 °F)   Resp 18   Wt 124 kg (273 lb)   SpO2 98%     Physical Exam  Constitutional:       General: He is not in acute distress.     Appearance: He is obese.   HENT:      Head: Normocephalic and atraumatic.   Eyes:      Conjunctiva/sclera: Conjunctivae normal.   Cardiovascular:      Rate and Rhythm: Normal rate and regular rhythm.   Pulmonary:      Effort: Pulmonary effort is normal. No respiratory distress.      Breath sounds: Normal breath sounds.   Abdominal:      General: Bowel sounds are normal. There is no distension.      Palpations: Abdomen is soft.      Tenderness: There is no abdominal tenderness.      Comments: Ostomy draining liquid brown   Genitourinary:     Comments: Suprapubic cath draining clear yellow  Musculoskeletal:         General: Normal range of motion.      Right lower leg: No edema.      Left lower leg: No edema.   Skin:     General: Skin is warm and  dry.   Neurological:      General: No focal deficit present.      Mental Status: He is alert. Mental status is at baseline.   Psychiatric:         Mood and Affect: Mood normal.         Behavior: Behavior normal.         Assessment/Plan   Problem List Items Addressed This Visit       Ileostomy in place (CMS/HCC)    Physical debility    Congenital encephalopathy (CMS/HCC)    Seizure disorder (CMS/HCC) - Primary    Suprapubic catheter (CMS/HCC)       A.  We will continue with supportive and restorative care as patient tolerates    B.  Laboratory examinations will next be due in January 2023 or as needed    C.  The patient's prognosis is guarded.

## 2023-07-31 ASSESSMENT — ENCOUNTER SYMPTOMS
CONSTIPATION: 0
MYALGIAS: 0
DIARRHEA: 0
FEVER: 0
DIAPHORESIS: 0
JOINT SWELLING: 0
NAUSEA: 0
VOMITING: 0
CHILLS: 0
COUGH: 0
SHORTNESS OF BREATH: 0

## 2023-08-18 ENCOUNTER — NURSING HOME VISIT (OUTPATIENT)
Dept: POST ACUTE CARE | Facility: EXTERNAL LOCATION | Age: 53
End: 2023-08-18
Payer: MEDICARE

## 2023-08-18 VITALS
DIASTOLIC BLOOD PRESSURE: 99 MMHG | SYSTOLIC BLOOD PRESSURE: 152 MMHG | OXYGEN SATURATION: 96 % | RESPIRATION RATE: 18 BRPM | WEIGHT: 273 LBS | TEMPERATURE: 97.9 F | HEART RATE: 58 BPM

## 2023-08-18 DIAGNOSIS — Z93.59 SUPRAPUBIC CATHETER (MULTI): ICD-10-CM

## 2023-08-18 DIAGNOSIS — G40.909 SEIZURE DISORDER (MULTI): Primary | ICD-10-CM

## 2023-08-18 DIAGNOSIS — N31.9 NEUROGENIC BLADDER: ICD-10-CM

## 2023-08-18 DIAGNOSIS — Z93.2 ILEOSTOMY IN PLACE (MULTI): ICD-10-CM

## 2023-08-18 DIAGNOSIS — R53.81 PHYSICAL DEBILITY: ICD-10-CM

## 2023-08-18 DIAGNOSIS — Q07.9 CONGENITAL ENCEPHALOPATHY (MULTI): ICD-10-CM

## 2023-08-18 DIAGNOSIS — F79 ACQUIRED INTELLECTUAL DISABILITY: ICD-10-CM

## 2023-08-18 PROCEDURE — 99308 SBSQ NF CARE LOW MDM 20: CPT | Performed by: INTERNAL MEDICINE

## 2023-08-18 NOTE — LETTER
Patient: Nato Downs  : 1970    Encounter Date: 2023    Subjective  Patient ID: Nato Downs is a 53 y.o. male who is long term resident being seen and evaluated for multiple medical problems.    HPI   This 53-year-old male patient has the common area and denies any complaints to me.  Nursing has no adverse events reported to me.    Current high risk medication:  Clonazepam  Diastat  Ditropan  Keppra  Risperidone  Trileptal    Laboratory examination from 2023:  Vitamin D 44  Keppra 33  Trileptal 23  Other labs normal except hemoglobin 13.2    Review of Systems   Constitutional:  Negative for chills, diaphoresis and fever.   Respiratory:  Negative for cough and shortness of breath.    Cardiovascular:  Negative for chest pain and leg swelling.   Gastrointestinal:  Negative for constipation, diarrhea, nausea and vomiting.   Musculoskeletal:  Negative for joint swelling and myalgias.       Objective  BP (!) 152/99   Pulse 58   Temp 36.6 °C (97.9 °F)   Resp 18   Wt 124 kg (273 lb)   SpO2 96%     Physical Exam  Constitutional:       General: He is not in acute distress.     Appearance: He is obese.   HENT:      Head: Normocephalic and atraumatic.   Eyes:      Conjunctiva/sclera: Conjunctivae normal.   Cardiovascular:      Rate and Rhythm: Normal rate and regular rhythm.   Pulmonary:      Effort: Pulmonary effort is normal. No respiratory distress.      Breath sounds: Normal breath sounds.   Abdominal:      General: Bowel sounds are normal. There is no distension.      Palpations: Abdomen is soft.      Tenderness: There is no abdominal tenderness.      Comments: Ostomy draining liquid brown   Genitourinary:     Comments: Suprapubic cath draining clear yellow  Musculoskeletal:         General: Normal range of motion.      Right lower leg: No edema.      Left lower leg: No edema.   Skin:     General: Skin is warm and dry.   Neurological:      General: No focal deficit present.      Mental Status:  He is alert. Mental status is at baseline.   Psychiatric:         Mood and Affect: Mood normal.         Behavior: Behavior normal.         Assessment/Plan  Problem List Items Addressed This Visit       Acquired intellectual disability    Ileostomy in place (CMS/HCC)    Neurogenic bladder    Physical debility    Congenital encephalopathy (CMS/HCC)    Seizure disorder (CMS/HCC) - Primary    Suprapubic catheter (CMS/HCC)     A.  We will continue with restorative supportive care as the patient tolerates    B.  Laboratory examinations will next be due in January 2024 or as needed    C.  The patient's prognosis is fair-2-guarded.        Electronically Signed By: Amilcar Quezada MD   8/28/23  4:40 PM

## 2023-08-18 NOTE — PROGRESS NOTES
Subjective   Patient ID: Nato Downs is a 53 y.o. male who is long term resident being seen and evaluated for multiple medical problems.    HPI   This 53-year-old male patient has the common area and denies any complaints to me.  Nursing has no adverse events reported to me.    Current high risk medication:  Clonazepam  Diastat  Ditropan  Keppra  Risperidone  Trileptal    Laboratory examination from July 2023:  Vitamin D 44  Keppra 33  Trileptal 23  Other labs normal except hemoglobin 13.2    Review of Systems   Constitutional:  Negative for chills, diaphoresis and fever.   Respiratory:  Negative for cough and shortness of breath.    Cardiovascular:  Negative for chest pain and leg swelling.   Gastrointestinal:  Negative for constipation, diarrhea, nausea and vomiting.   Musculoskeletal:  Negative for joint swelling and myalgias.       Objective   BP (!) 152/99   Pulse 58   Temp 36.6 °C (97.9 °F)   Resp 18   Wt 124 kg (273 lb)   SpO2 96%     Physical Exam  Constitutional:       General: He is not in acute distress.     Appearance: He is obese.   HENT:      Head: Normocephalic and atraumatic.   Eyes:      Conjunctiva/sclera: Conjunctivae normal.   Cardiovascular:      Rate and Rhythm: Normal rate and regular rhythm.   Pulmonary:      Effort: Pulmonary effort is normal. No respiratory distress.      Breath sounds: Normal breath sounds.   Abdominal:      General: Bowel sounds are normal. There is no distension.      Palpations: Abdomen is soft.      Tenderness: There is no abdominal tenderness.      Comments: Ostomy draining liquid brown   Genitourinary:     Comments: Suprapubic cath draining clear yellow  Musculoskeletal:         General: Normal range of motion.      Right lower leg: No edema.      Left lower leg: No edema.   Skin:     General: Skin is warm and dry.   Neurological:      General: No focal deficit present.      Mental Status: He is alert. Mental status is at baseline.   Psychiatric:          Mood and Affect: Mood normal.         Behavior: Behavior normal.         Assessment/Plan   Problem List Items Addressed This Visit       Acquired intellectual disability    Ileostomy in place (CMS/HCC)    Neurogenic bladder    Physical debility    Congenital encephalopathy (CMS/HCC)    Seizure disorder (CMS/HCC) - Primary    Suprapubic catheter (CMS/HCC)     A.  We will continue with restorative supportive care as the patient tolerates    B.  Laboratory examinations will next be due in January 2024 or as needed    C.  The patient's prognosis is fair-2-guarded.

## 2023-08-28 ASSESSMENT — ENCOUNTER SYMPTOMS
CHILLS: 0
NAUSEA: 0
DIARRHEA: 0
DIAPHORESIS: 0
COUGH: 0
SHORTNESS OF BREATH: 0
VOMITING: 0
MYALGIAS: 0
JOINT SWELLING: 0
CONSTIPATION: 0
FEVER: 0

## 2023-08-29 ENCOUNTER — NURSING HOME VISIT (OUTPATIENT)
Dept: POST ACUTE CARE | Facility: EXTERNAL LOCATION | Age: 53
End: 2023-08-29
Payer: MEDICARE

## 2023-08-29 VITALS
DIASTOLIC BLOOD PRESSURE: 99 MMHG | OXYGEN SATURATION: 96 % | RESPIRATION RATE: 18 BRPM | HEART RATE: 58 BPM | SYSTOLIC BLOOD PRESSURE: 152 MMHG | TEMPERATURE: 98 F | WEIGHT: 275 LBS

## 2023-08-29 DIAGNOSIS — G40.909 SEIZURE DISORDER (MULTI): ICD-10-CM

## 2023-08-29 DIAGNOSIS — Z93.59 SUPRAPUBIC CATHETER (MULTI): Primary | ICD-10-CM

## 2023-08-29 DIAGNOSIS — Z93.2 ILEOSTOMY IN PLACE (MULTI): ICD-10-CM

## 2023-08-29 PROCEDURE — 99309 SBSQ NF CARE MODERATE MDM 30: CPT | Performed by: NURSE PRACTITIONER

## 2023-08-29 ASSESSMENT — ENCOUNTER SYMPTOMS
NAUSEA: 0
FEVER: 0
PALPITATIONS: 0
COUGH: 0
FATIGUE: 0
DIFFICULTY URINATING: 0
VOMITING: 0
CHILLS: 0
SHORTNESS OF BREATH: 0
ABDOMINAL PAIN: 0
CONSTIPATION: 0
DIARRHEA: 0

## 2023-08-29 NOTE — ASSESSMENT & PLAN NOTE
He has developed leakage from suprapubic cath site, per staff the insertion site seems to have enlarged.  He has an appointment scheduled with his urologist later today.    Will defer further treatment to urology

## 2023-08-29 NOTE — PROGRESS NOTES
Subjective   Nato Downs is a 53 y.o. male requested by staff to be evaluated for leaking suprapubic cath.   HPI   Staff noted that his suprapubic cath insertion site apepars to have enlarged and is consistently leaking, family was notified and he has an appointment with urology later this afternoon.  Grzegorz is sitting up in dining room, states he feels fine.  Eating and drinking well.   No complaints of pain.       Review of Systems   Constitutional:  Negative for chills, fatigue and fever.   Respiratory:  Negative for cough and shortness of breath.    Cardiovascular:  Negative for chest pain and palpitations.   Gastrointestinal:  Negative for abdominal pain, constipation, diarrhea, nausea and vomiting.   Genitourinary:  Negative for difficulty urinating.        Suprapubic cath       Objective   BP (!) 152/99   Pulse 58   Temp 36.7 °C (98 °F)   Resp 18   Wt 125 kg (275 lb)   SpO2 96%     Physical Exam  Constitutional:       General: He is not in acute distress.     Appearance: He is obese.   HENT:      Head: Normocephalic and atraumatic.   Eyes:      Conjunctiva/sclera: Conjunctivae normal.   Cardiovascular:      Rate and Rhythm: Normal rate and regular rhythm.   Pulmonary:      Effort: Pulmonary effort is normal. No respiratory distress.      Breath sounds: Normal breath sounds.   Abdominal:      General: Bowel sounds are normal. There is no distension.      Palpations: Abdomen is soft.      Tenderness: There is no abdominal tenderness.      Comments: Ostomy draining liquid brown   Genitourinary:     Comments: Suprapubic cath draining clear yellow  Musculoskeletal:         General: Normal range of motion.      Right lower leg: No edema.      Left lower leg: No edema.   Skin:     General: Skin is warm and dry.   Neurological:      General: No focal deficit present.      Mental Status: He is alert. Mental status is at baseline.   Psychiatric:         Mood and Affect: Mood normal.         Behavior: Behavior  normal.         Assessment/Plan   Problem List Items Addressed This Visit       Ileostomy in place (CMS/[facility])     Functioning well.           Seizure disorder (CMS/[facility])     no known recent seizures.  Staff to monitor and notify for any seizure activity.             Suprapubic catheter (CMS/[facility]) - Primary     He has developed leakage from suprapubic cath site, per staff the insertion site seems to have enlarged.  He has an appointment scheduled with his urologist later today.    Will defer further treatment to urology        labs/meds/orders reviewed  staff to monitor and notify for any changes.  He is scheduled to see his urologist later today who will determine further treatment plan.   Next labs 1/24 and prn

## 2023-08-29 NOTE — LETTER
Patient: Nato Downs  : 1970    Encounter Date: 2023    Subjective  Nato Downs is a 53 y.o. male requested by staff to be evaluated for leaking suprapubic cath.   HPI   Staff noted that his suprapubic cath insertion site apepars to have enlarged and is consistently leaking, family was notified and he has an appointment with urology later this afternoon.  Grzegorz is sitting up in dining room, states he feels fine.  Eating and drinking well.   No complaints of pain.       Review of Systems   Constitutional:  Negative for chills, fatigue and fever.   Respiratory:  Negative for cough and shortness of breath.    Cardiovascular:  Negative for chest pain and palpitations.   Gastrointestinal:  Negative for abdominal pain, constipation, diarrhea, nausea and vomiting.   Genitourinary:  Negative for difficulty urinating.        Suprapubic cath       Objective  BP (!) 152/99   Pulse 58   Temp 36.7 °C (98 °F)   Resp 18   Wt 125 kg (275 lb)   SpO2 96%     Physical Exam  Constitutional:       General: He is not in acute distress.     Appearance: He is obese.   HENT:      Head: Normocephalic and atraumatic.   Eyes:      Conjunctiva/sclera: Conjunctivae normal.   Cardiovascular:      Rate and Rhythm: Normal rate and regular rhythm.   Pulmonary:      Effort: Pulmonary effort is normal. No respiratory distress.      Breath sounds: Normal breath sounds.   Abdominal:      General: Bowel sounds are normal. There is no distension.      Palpations: Abdomen is soft.      Tenderness: There is no abdominal tenderness.      Comments: Ostomy draining liquid brown   Genitourinary:     Comments: Suprapubic cath draining clear yellow  Musculoskeletal:         General: Normal range of motion.      Right lower leg: No edema.      Left lower leg: No edema.   Skin:     General: Skin is warm and dry.   Neurological:      General: No focal deficit present.      Mental Status: He is alert. Mental status is at baseline.    Psychiatric:         Mood and Affect: Mood normal.         Behavior: Behavior normal.         Assessment/Plan  Problem List Items Addressed This Visit       Ileostomy in place (CMS/HCC)     Functioning well.           Seizure disorder (CMS/HCC)     no known recent seizures.  Staff to monitor and notify for any seizure activity.             Suprapubic catheter (CMS/HCC) - Primary     He has developed leakage from suprapubic cath site, per staff the insertion site seems to have enlarged.  He has an appointment scheduled with his urologist later today.    Will defer further treatment to urology        labs/meds/orders reviewed  staff to monitor and notify for any changes.  He is scheduled to see his urologist later today who will determine further treatment plan.   Next labs 1/24 and prn      Electronically Signed By: NASREEN Larios   8/29/23 11:27 AM

## 2023-09-15 ENCOUNTER — NURSING HOME VISIT (OUTPATIENT)
Dept: POST ACUTE CARE | Facility: EXTERNAL LOCATION | Age: 53
End: 2023-09-15
Payer: MEDICARE

## 2023-09-15 DIAGNOSIS — N31.9 NEUROGENIC BLADDER: ICD-10-CM

## 2023-09-15 DIAGNOSIS — Q07.9 CONGENITAL ENCEPHALOPATHY (MULTI): ICD-10-CM

## 2023-09-15 DIAGNOSIS — Z93.2 ILEOSTOMY IN PLACE (MULTI): ICD-10-CM

## 2023-09-15 DIAGNOSIS — G40.909 SEIZURE DISORDER (MULTI): ICD-10-CM

## 2023-09-15 DIAGNOSIS — T83.010A SUPRAPUBIC CATHETER DYSFUNCTION, INITIAL ENCOUNTER (CMS-HCC): Primary | ICD-10-CM

## 2023-09-15 DIAGNOSIS — K59.2 NEUROGENIC BOWEL: ICD-10-CM

## 2023-09-15 PROCEDURE — 99308 SBSQ NF CARE LOW MDM 20: CPT | Performed by: INTERNAL MEDICINE

## 2023-09-15 NOTE — LETTER
Patient: Nato Downs  : 1970    Encounter Date: 09/15/2023    Subjective  Patient ID: Nato Downs is a 53 y.o. male who is long term resident being seen and evaluated for multiple medical problems.    HPI   This 53-year-old male patient is up and about the facility as per his usual status.  He has no complaints for me today.  Nursing has no new adverse events reported to me.  He is scheduled for revision of his suprapubic catheter in the upcoming near future.    Current high risk medication:  Clonazepam  Valium  Ditropan  Keppra  Risperidone    Laboratory examination from 2023:  Vitamin D 44  Keppra 33  Oxcarbazepine 23  Potassium 4.1  Bicarbonate 27  Creatinine 0.9  Albumin 3.9  Liver injury test normal  Hemoglobin 13.3    Review of Systems   Constitutional:  Negative for chills, fatigue and fever.   Respiratory:  Negative for cough and shortness of breath.    Cardiovascular:  Negative for chest pain and palpitations.   Gastrointestinal:  Negative for abdominal pain, constipation, diarrhea, nausea and vomiting.   Genitourinary:  Negative for difficulty urinating.        Suprapubic cath       Objective  BP (!) 152/99   Pulse 58   Temp 36.4 °C (97.6 °F)   Resp 18   Wt 125 kg (275 lb)   SpO2 97%   BMI 38.46 kg/m²     Physical Exam  Constitutional:       General: He is not in acute distress.     Appearance: He is obese.   HENT:      Head: Normocephalic and atraumatic.   Eyes:      Conjunctiva/sclera: Conjunctivae normal.   Cardiovascular:      Rate and Rhythm: Normal rate and regular rhythm.   Pulmonary:      Effort: Pulmonary effort is normal. No respiratory distress.      Breath sounds: Normal breath sounds.   Abdominal:      General: Bowel sounds are normal. There is no distension.      Palpations: Abdomen is soft.      Tenderness: There is no abdominal tenderness.      Comments: Ostomy draining liquid brown   Genitourinary:     Comments: Suprapubic cath draining clear  yellow  Musculoskeletal:         General: Normal range of motion.      Right lower leg: No edema.      Left lower leg: No edema.   Skin:     General: Skin is warm and dry.   Neurological:      General: No focal deficit present.      Mental Status: He is alert. Mental status is at baseline.   Psychiatric:         Mood and Affect: Mood normal.         Behavior: Behavior normal.         Assessment/Plan  Problem List Items Addressed This Visit       Ileostomy in place (CMS/HCC)    Neurogenic bladder    Neurogenic bowel    Congenital encephalopathy (CMS/HCC)    Seizure disorder (CMS/HCC)    Suprapubic catheter dysfunction (CMS/HCC) - Primary       A.  We will continue with restorative and supportive care as the patient tolerates    B.  Laboratory examinations next be due in January 2024 or as needed    C.  The patient will proceed with suprapubic catheter revision per urology    D.  The patient's prognosis is fair-2-guarded.      Electronically Signed By: Amilcar Quezada MD   9/22/23  5:28 PM

## 2023-09-20 VITALS
TEMPERATURE: 97.6 F | OXYGEN SATURATION: 97 % | BODY MASS INDEX: 38.46 KG/M2 | SYSTOLIC BLOOD PRESSURE: 152 MMHG | HEART RATE: 58 BPM | DIASTOLIC BLOOD PRESSURE: 99 MMHG | RESPIRATION RATE: 18 BRPM | WEIGHT: 275 LBS

## 2023-09-20 NOTE — PROGRESS NOTES
Subjective   Patient ID: Nato Downs is a 53 y.o. male who is long term resident being seen and evaluated for multiple medical problems.    HPI   This 53-year-old male patient is up and about the facility as per his usual status.  He has no complaints for me today.  Nursing has no new adverse events reported to me.  He is scheduled for revision of his suprapubic catheter in the upcoming near future.    Current high risk medication:  Clonazepam  Valium  Ditropan  Keppra  Risperidone    Laboratory examination from July 2023:  Vitamin D 44  Keppra 33  Oxcarbazepine 23  Potassium 4.1  Bicarbonate 27  Creatinine 0.9  Albumin 3.9  Liver injury test normal  Hemoglobin 13.3    Review of Systems   Constitutional:  Negative for chills, fatigue and fever.   Respiratory:  Negative for cough and shortness of breath.    Cardiovascular:  Negative for chest pain and palpitations.   Gastrointestinal:  Negative for abdominal pain, constipation, diarrhea, nausea and vomiting.   Genitourinary:  Negative for difficulty urinating.        Suprapubic cath       Objective   BP (!) 152/99   Pulse 58   Temp 36.4 °C (97.6 °F)   Resp 18   Wt 125 kg (275 lb)   SpO2 97%   BMI 38.46 kg/m²     Physical Exam  Constitutional:       General: He is not in acute distress.     Appearance: He is obese.   HENT:      Head: Normocephalic and atraumatic.   Eyes:      Conjunctiva/sclera: Conjunctivae normal.   Cardiovascular:      Rate and Rhythm: Normal rate and regular rhythm.   Pulmonary:      Effort: Pulmonary effort is normal. No respiratory distress.      Breath sounds: Normal breath sounds.   Abdominal:      General: Bowel sounds are normal. There is no distension.      Palpations: Abdomen is soft.      Tenderness: There is no abdominal tenderness.      Comments: Ostomy draining liquid brown   Genitourinary:     Comments: Suprapubic cath draining clear yellow  Musculoskeletal:         General: Normal range of motion.      Right lower leg: No  edema.      Left lower leg: No edema.   Skin:     General: Skin is warm and dry.   Neurological:      General: No focal deficit present.      Mental Status: He is alert. Mental status is at baseline.   Psychiatric:         Mood and Affect: Mood normal.         Behavior: Behavior normal.         Assessment/Plan   Problem List Items Addressed This Visit       Ileostomy in place (CMS/HCC)    Neurogenic bladder    Neurogenic bowel    Congenital encephalopathy (CMS/HCC)    Seizure disorder (CMS/HCC)    Suprapubic catheter dysfunction (CMS/HCC) - Primary       A.  We will continue with restorative and supportive care as the patient tolerates    B.  Laboratory examinations next be due in January 2024 or as needed    C.  The patient will proceed with suprapubic catheter revision per urology    D.  The patient's prognosis is fair-2-guarded.

## 2023-09-22 PROBLEM — T83.010A SUPRAPUBIC CATHETER DYSFUNCTION (CMS-HCC): Status: ACTIVE | Noted: 2023-09-22

## 2023-09-22 ASSESSMENT — ENCOUNTER SYMPTOMS
FATIGUE: 0
VOMITING: 0
DIFFICULTY URINATING: 0
COUGH: 0
FEVER: 0
PALPITATIONS: 0
NAUSEA: 0
ABDOMINAL PAIN: 0
SHORTNESS OF BREATH: 0
DIARRHEA: 0
CHILLS: 0
CONSTIPATION: 0

## 2023-10-10 ENCOUNTER — NURSING HOME VISIT (OUTPATIENT)
Dept: POST ACUTE CARE | Facility: EXTERNAL LOCATION | Age: 53
End: 2023-10-10
Payer: MEDICARE

## 2023-10-10 VITALS
BODY MASS INDEX: 38.18 KG/M2 | WEIGHT: 273 LBS | OXYGEN SATURATION: 97 % | TEMPERATURE: 98 F | SYSTOLIC BLOOD PRESSURE: 148 MMHG | RESPIRATION RATE: 18 BRPM | HEART RATE: 58 BPM | DIASTOLIC BLOOD PRESSURE: 80 MMHG

## 2023-10-10 DIAGNOSIS — F39 MOOD DISORDER (CMS-HCC): ICD-10-CM

## 2023-10-10 DIAGNOSIS — G40.909 SEIZURE DISORDER (MULTI): ICD-10-CM

## 2023-10-10 DIAGNOSIS — Z93.2 ILEOSTOMY IN PLACE (MULTI): ICD-10-CM

## 2023-10-10 DIAGNOSIS — T83.010D SUPRAPUBIC CATHETER DYSFUNCTION, SUBSEQUENT ENCOUNTER: Primary | ICD-10-CM

## 2023-10-10 PROCEDURE — 99309 SBSQ NF CARE MODERATE MDM 30: CPT | Performed by: NURSE PRACTITIONER

## 2023-10-10 ASSESSMENT — ENCOUNTER SYMPTOMS
NAUSEA: 0
VOMITING: 0
CONSTIPATION: 0
FATIGUE: 0
SHORTNESS OF BREATH: 0
DIFFICULTY URINATING: 0
ABDOMINAL PAIN: 0
COUGH: 0
CHILLS: 0
FEVER: 0
PALPITATIONS: 0
DIARRHEA: 0

## 2023-10-10 NOTE — LETTER
Patient: Nato Downs  : 1970    Encounter Date: 10/10/2023    Subjective  Nato Downs is a 53 y.o. male Here for routine monthly visit.   HPI Multiple health problems have been reviewed.  The course has been unchanged.  The patient  is moderately confused.   There are no family members present during time of visit.    he  denies any complaints when asked.  Eating and drinking well.   He is being scheduled for suprapubic cath revision in the near future through urology.      Review of Systems   Constitutional:  Negative for chills, fatigue and fever.   Respiratory:  Negative for cough and shortness of breath.    Cardiovascular:  Negative for chest pain and palpitations.   Gastrointestinal:  Negative for abdominal pain, constipation, diarrhea, nausea and vomiting.   Genitourinary:  Negative for difficulty urinating.       Objective  /80   Pulse 58   Temp 36.7 °C (98 °F)   Resp 18   Wt 124 kg (273 lb)   SpO2 97%   BMI 38.18 kg/m²     Physical Exam  Constitutional:       General: He is not in acute distress.     Appearance: He is obese.   HENT:      Head: Normocephalic and atraumatic.   Eyes:      Conjunctiva/sclera: Conjunctivae normal.   Cardiovascular:      Rate and Rhythm: Normal rate and regular rhythm.   Pulmonary:      Effort: Pulmonary effort is normal. No respiratory distress.      Breath sounds: Normal breath sounds.   Abdominal:      General: Bowel sounds are normal. There is no distension.      Palpations: Abdomen is soft.      Tenderness: There is no abdominal tenderness.      Comments: Ostomy draining liquid brown   Genitourinary:     Comments: Suprapubic cath draining clear yellow  Musculoskeletal:         General: Normal range of motion.      Right lower leg: No edema.      Left lower leg: No edema.   Skin:     General: Skin is warm and dry.   Neurological:      General: No focal deficit present.      Mental Status: He is alert. Mental status is at baseline.   Psychiatric:          Mood and Affect: Mood normal.         Behavior: Behavior normal.         Assessment/Plan  Problem List Items Addressed This Visit       Ileostomy in place (CMS/HCC)     Functioning well.           Mood disorder (CMS/HCC)     No recurrence of behaviors.          Seizure disorder (CMS/HCC)     no known recent seizures.  Staff to monitor and notify for any seizure activity.             Suprapubic catheter dysfunction (CMS/HCC) - Primary     He is followed by urology and has suprapubic cath revision being scheduled.         labs/meds/orders reviewed  staff to monitor and notify for any changes.  continue with supportive and restorative care  next labs 1/24 and prn.       Electronically Signed By: CHARLA Larios-CNP   10/10/23 12:19 PM

## 2023-10-10 NOTE — PROGRESS NOTES
Subjective   Nato Downs is a 53 y.o. male Here for routine monthly visit.   HPI Multiple health problems have been reviewed.  The course has been unchanged.  The patient  is moderately confused.   There are no family members present during time of visit.    he  denies any complaints when asked.  Eating and drinking well.   He is being scheduled for suprapubic cath revision in the near future through urology.      Review of Systems   Constitutional:  Negative for chills, fatigue and fever.   Respiratory:  Negative for cough and shortness of breath.    Cardiovascular:  Negative for chest pain and palpitations.   Gastrointestinal:  Negative for abdominal pain, constipation, diarrhea, nausea and vomiting.   Genitourinary:  Negative for difficulty urinating.       Objective   /80   Pulse 58   Temp 36.7 °C (98 °F)   Resp 18   Wt 124 kg (273 lb)   SpO2 97%   BMI 38.18 kg/m²     Physical Exam  Constitutional:       General: He is not in acute distress.     Appearance: He is obese.   HENT:      Head: Normocephalic and atraumatic.   Eyes:      Conjunctiva/sclera: Conjunctivae normal.   Cardiovascular:      Rate and Rhythm: Normal rate and regular rhythm.   Pulmonary:      Effort: Pulmonary effort is normal. No respiratory distress.      Breath sounds: Normal breath sounds.   Abdominal:      General: Bowel sounds are normal. There is no distension.      Palpations: Abdomen is soft.      Tenderness: There is no abdominal tenderness.      Comments: Ostomy draining liquid brown   Genitourinary:     Comments: Suprapubic cath draining clear yellow  Musculoskeletal:         General: Normal range of motion.      Right lower leg: No edema.      Left lower leg: No edema.   Skin:     General: Skin is warm and dry.   Neurological:      General: No focal deficit present.      Mental Status: He is alert. Mental status is at baseline.   Psychiatric:         Mood and Affect: Mood normal.         Behavior: Behavior normal.          Assessment/Plan   Problem List Items Addressed This Visit       Ileostomy in place (CMS/[facility])     Functioning well.           Mood disorder (CMS/[facility])     No recurrence of behaviors.          Seizure disorder (CMS/[facility])     no known recent seizures.  Staff to monitor and notify for any seizure activity.             Suprapubic catheter dysfunction (CMS/HCC) - Primary     He is followed by urology and has suprapubic cath revision being scheduled.         labs/meds/orders reviewed  staff to monitor and notify for any changes.  continue with supportive and restorative care  next labs 1/24 and prn.

## 2023-10-19 ENCOUNTER — NURSING HOME VISIT (OUTPATIENT)
Dept: POST ACUTE CARE | Facility: EXTERNAL LOCATION | Age: 53
End: 2023-10-19
Payer: MEDICARE

## 2023-10-19 DIAGNOSIS — K59.2 NEUROGENIC BOWEL: ICD-10-CM

## 2023-10-19 DIAGNOSIS — Q07.9 CONGENITAL ENCEPHALOPATHY (MULTI): ICD-10-CM

## 2023-10-19 DIAGNOSIS — G40.909 SEIZURE DISORDER (MULTI): ICD-10-CM

## 2023-10-19 DIAGNOSIS — Z93.2 ILEOSTOMY IN PLACE (MULTI): ICD-10-CM

## 2023-10-19 DIAGNOSIS — N31.9 NEUROGENIC BLADDER: ICD-10-CM

## 2023-10-19 DIAGNOSIS — Z93.59 SUPRAPUBIC CATHETER (MULTI): Primary | ICD-10-CM

## 2023-10-19 PROCEDURE — 99309 SBSQ NF CARE MODERATE MDM 30: CPT | Performed by: INTERNAL MEDICINE

## 2023-10-19 NOTE — LETTER
Patient: Nato Downs  : 1970    Encounter Date: 10/19/2023    Subjective  Patient ID: Nato Downs is a 53 y.o. male who is long term resident being seen and evaluated for multiple medical problems.    HPI   This 53-year-old male patient is resting comfortably in his bed in no distress.  Nursing has no new adverse events reported to me at this time.  The patient is awaiting suprapubic catheter revision by urology.  The patient has no complaints of pain or shortness of breath me at this time.    Current Saurabh medication:  Clonazepam  Diastat  Ditropan  Keppra  Risperidone  Trileptal    Laboratory examination from 2023:  Potassium 4.1  Bicarbonate 28  Creatinine 1.1  Hemoglobin 12.5  Laboratory examination from 2023:  Vitamin D 45  Keppra 33  Trileptal less than 3  Albumin 3.9    Review of Systems   Constitutional:  Negative for chills, fatigue and fever.   Respiratory:  Negative for cough and shortness of breath.    Cardiovascular:  Negative for chest pain and palpitations.   Gastrointestinal:  Negative for abdominal pain, constipation, diarrhea, nausea and vomiting.   Genitourinary:  Negative for difficulty urinating.       Objective  BP (!) 142/98   Pulse 100   Temp 36.7 °C (98 °F)   Resp 16   Wt 124 kg (274 lb)   SpO2 96%   BMI 38.32 kg/m²     Physical Exam  Constitutional:       General: He is not in acute distress.     Appearance: He is obese.   HENT:      Head: Normocephalic and atraumatic.   Eyes:      Conjunctiva/sclera: Conjunctivae normal.   Cardiovascular:      Rate and Rhythm: Normal rate and regular rhythm.   Pulmonary:      Effort: Pulmonary effort is normal. No respiratory distress.      Breath sounds: Normal breath sounds.   Abdominal:      General: Bowel sounds are normal. There is no distension.      Palpations: Abdomen is soft.      Tenderness: There is no abdominal tenderness.      Comments: Ostomy draining liquid brown   Genitourinary:     Comments: Suprapubic cath  draining clear yellow  Musculoskeletal:         General: Normal range of motion.      Right lower leg: No edema.      Left lower leg: No edema.   Skin:     General: Skin is warm and dry.   Neurological:      General: No focal deficit present.      Mental Status: He is alert. Mental status is at baseline.   Psychiatric:         Mood and Affect: Mood normal.         Behavior: Behavior normal.         Assessment/Plan  Problem List Items Addressed This Visit             ICD-10-CM    Ileostomy in place (CMS/[facility]) Z93.2    Neurogenic bladder N31.9    Neurogenic bowel K59.2    Congenital encephalopathy (CMS/[facility]) Q07.9    Seizure disorder (CMS/[facility]) G40.909    Suprapubic catheter (CMS/HCC) - Primary Z93.59     A.  We will continue with restorative and supportive care as patient tolerates    B.  Laboratory examinations will next be due in April 2024 or as needed preoperatively for the suprapubic catheter revision    C.  We will look through the patient's blood pressures but I suspect that he is having hypertension now and needs to be treated either with an ACE inhibitor or a dihydropyridine calcium channel blocker    D.  The patient's prognosis is guarded.        Electronically Signed By: Amilcar Quezada MD   10/26/23  4:20 PM

## 2023-10-24 ENCOUNTER — NURSING HOME VISIT (OUTPATIENT)
Dept: POST ACUTE CARE | Facility: EXTERNAL LOCATION | Age: 53
End: 2023-10-24
Payer: MEDICARE

## 2023-10-24 VITALS
RESPIRATION RATE: 16 BRPM | WEIGHT: 274 LBS | BODY MASS INDEX: 38.32 KG/M2 | DIASTOLIC BLOOD PRESSURE: 98 MMHG | TEMPERATURE: 98 F | OXYGEN SATURATION: 96 % | HEART RATE: 100 BPM | SYSTOLIC BLOOD PRESSURE: 142 MMHG

## 2023-10-24 VITALS
TEMPERATURE: 97.9 F | BODY MASS INDEX: 38.18 KG/M2 | OXYGEN SATURATION: 95 % | RESPIRATION RATE: 18 BRPM | DIASTOLIC BLOOD PRESSURE: 80 MMHG | WEIGHT: 273 LBS | HEART RATE: 80 BPM | SYSTOLIC BLOOD PRESSURE: 142 MMHG

## 2023-10-24 DIAGNOSIS — K59.2 NEUROGENIC BOWEL: ICD-10-CM

## 2023-10-24 DIAGNOSIS — G40.909 SEIZURE DISORDER (MULTI): ICD-10-CM

## 2023-10-24 DIAGNOSIS — N31.9 NEUROGENIC BLADDER: ICD-10-CM
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
## 2023-10-24 DIAGNOSIS — R05.8 OTHER COUGH: ICD-10-CM
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
## 2023-10-24 PROBLEM — R05.9 COUGH: Status: ACTIVE | Noted: 2023-10-24

## 2023-10-24 PROCEDURE — 99309 SBSQ NF CARE MODERATE MDM 30: CPT | Performed by: NURSE PRACTITIONER

## 2023-10-24 ASSESSMENT — ENCOUNTER SYMPTOMS
DIFFICULTY URINATING: 0
VOMITING: 0
CONSTIPATION: 0
COUGH: 0
FATIGUE: 0
FEVER: 0
SHORTNESS OF BREATH: 0
ABDOMINAL PAIN: 0
DIARRHEA: 0
PALPITATIONS: 0
NAUSEA: 0
CHILLS: 0

## 2023-10-24 NOTE — ASSESSMENT & PLAN NOTE
He had a cough last week, cxr done showed no acute pathology and cough has rresolved.  Staff to monitor and notify for any recurrence of symptoms.

## 2023-10-24 NOTE — ASSESSMENT & PLAN NOTE
He was attempting to sit down in chair in dining room and missed the chair, landed on his buttocks.  No injury.

## 2023-10-24 NOTE — LETTER
Patient: Nato Downs  : 1970    Encounter Date: 10/24/2023    Subjective  Nato Downs is a 53 y.o. male Here to follow up on fall and cough.   HPI  He was attempting to sit in chair in the dining room when he missed the chair and fell, landed on his buttocks, no injury noted.  He also was noted to have a cough last week, cx was done and negative for acute pathology, cough has resolved.  Grzegorz is sitting up in chair, states he is feeling well.  No concerns per staff.     Review of Systems   Constitutional:  Negative for chills, fatigue and fever.   Respiratory:  Negative for cough and shortness of breath.    Cardiovascular:  Negative for chest pain and palpitations.   Gastrointestinal:  Negative for abdominal pain, constipation, diarrhea, nausea and vomiting.   Genitourinary:  Negative for difficulty urinating.       Objective  /80   Pulse 80   Temp 36.6 °C (97.9 °F)   Resp 18   Wt 124 kg (273 lb)   SpO2 95%   BMI 38.18 kg/m²     Physical Exam  Constitutional:       General: He is not in acute distress.     Appearance: He is obese.   HENT:      Head: Normocephalic and atraumatic.   Eyes:      Conjunctiva/sclera: Conjunctivae normal.   Cardiovascular:      Rate and Rhythm: Normal rate and regular rhythm.   Pulmonary:      Effort: Pulmonary effort is normal. No respiratory distress.      Breath sounds: Normal breath sounds.   Abdominal:      General: Bowel sounds are normal. There is no distension.      Palpations: Abdomen is soft.      Tenderness: There is no abdominal tenderness.      Comments: Ostomy draining liquid brown   Genitourinary:     Comments: Suprapubic cath draining clear yellow  Musculoskeletal:         General: Normal range of motion.      Right lower leg: No edema.      Left lower leg: No edema.   Skin:     General: Skin is warm and dry.   Neurological:      General: No focal deficit present.      Mental Status: He is alert. Mental status is at baseline.   Psychiatric:          Mood and Affect: Mood normal.         Behavior: Behavior normal.         Assessment/Plan  Problem List Items Addressed This Visit       Neurogenic bladder     Suprapubic cath.           Neurogenic bowel     Ostomy in place.  Functioning well per staff.          Seizure disorder (CMS/HCC)     no known recent seizures.  Staff to monitor and notify for any seizure activity.             Fall - Primary     He was attempting to sit down in chair in dining room and missed the chair, landed on his buttocks.  No injury.           Cough     He had a cough last week, cxr done showed no acute pathology and cough has rresolved.  Staff to monitor and notify for any recurrence of symptoms.          labs/meds/orders reviewed  staff to monitor and notify for any changes.  continue with supportive and restorative care  next labs 1/24 and prn.       Electronically Signed By: NASREEN Larios   10/24/23 10:41 AM

## 2023-10-24 NOTE — ASSESSMENT & PLAN NOTE
Ostomy in place.  Functioning well per staff.    Problem: Falls - Risk of  Goal: *Absence of Falls  Description: Document Yung Cease Fall Risk and appropriate interventions in the flowsheet.   Outcome: Progressing Towards Goal  Note: Fall Risk Interventions:  Mobility Interventions: PT Consult for mobility concerns    Mentation Interventions: Evaluate medications/consider consulting pharmacy    Medication Interventions: Evaluate medications/consider consulting pharmacy    Elimination Interventions: Bed/chair exit alarm

## 2023-10-24 NOTE — PROGRESS NOTES
Subjective   Nato Downs is a 53 y.o. male Here to follow up on fall and cough.   HPI  He was attempting to sit in chair in the dining room when he missed the chair and fell, landed on his buttocks, no injury noted.  He also was noted to have a cough last week, cx was done and negative for acute pathology, cough has resolved.  Grzegorz is sitting up in chair, states he is feeling well.  No concerns per staff.     Review of Systems   Constitutional:  Negative for chills, fatigue and fever.   Respiratory:  Negative for cough and shortness of breath.    Cardiovascular:  Negative for chest pain and palpitations.   Gastrointestinal:  Negative for abdominal pain, constipation, diarrhea, nausea and vomiting.   Genitourinary:  Negative for difficulty urinating.       Objective   /80   Pulse 80   Temp 36.6 °C (97.9 °F)   Resp 18   Wt 124 kg (273 lb)   SpO2 95%   BMI 38.18 kg/m²     Physical Exam  Constitutional:       General: He is not in acute distress.     Appearance: He is obese.   HENT:      Head: Normocephalic and atraumatic.   Eyes:      Conjunctiva/sclera: Conjunctivae normal.   Cardiovascular:      Rate and Rhythm: Normal rate and regular rhythm.   Pulmonary:      Effort: Pulmonary effort is normal. No respiratory distress.      Breath sounds: Normal breath sounds.   Abdominal:      General: Bowel sounds are normal. There is no distension.      Palpations: Abdomen is soft.      Tenderness: There is no abdominal tenderness.      Comments: Ostomy draining liquid brown   Genitourinary:     Comments: Suprapubic cath draining clear yellow  Musculoskeletal:         General: Normal range of motion.      Right lower leg: No edema.      Left lower leg: No edema.   Skin:     General: Skin is warm and dry.   Neurological:      General: No focal deficit present.      Mental Status: He is alert. Mental status is at baseline.   Psychiatric:         Mood and Affect: Mood normal.         Behavior: Behavior normal.          Assessment/Plan   Problem List Items Addressed This Visit       Neurogenic bladder     Suprapubic cath.           Neurogenic bowel     Ostomy in place.  Functioning well per staff.          Seizure disorder (CMS/HCC)     no known recent seizures.  Staff to monitor and notify for any seizure activity.             Fall - Primary     He was attempting to sit down in chair in dining room and missed the chair, landed on his buttocks.  No injury.           Cough     He had a cough last week, cxr done showed no acute pathology and cough has rresolved.  Staff to monitor and notify for any recurrence of symptoms.          labs/meds/orders reviewed  staff to monitor and notify for any changes.  continue with supportive and restorative care  next labs 1/24 and prn.

## 2023-10-24 NOTE — PROGRESS NOTES
Subjective   Patient ID: Nato Downs is a 53 y.o. male who is long term resident being seen and evaluated for multiple medical problems.    HPI   This 53-year-old male patient is resting comfortably in his bed in no distress.  Nursing has no new adverse events reported to me at this time.  The patient is awaiting suprapubic catheter revision by urology.  The patient has no complaints of pain or shortness of breath me at this time.    Current Saurabh medication:  Clonazepam  Diastat  Ditropan  Keppra  Risperidone  Trileptal    Laboratory examination from October 2023:  Potassium 4.1  Bicarbonate 28  Creatinine 1.1  Hemoglobin 12.5  Laboratory examination from July 2023:  Vitamin D 45  Keppra 33  Trileptal less than 3  Albumin 3.9    Review of Systems   Constitutional:  Negative for chills, fatigue and fever.   Respiratory:  Negative for cough and shortness of breath.    Cardiovascular:  Negative for chest pain and palpitations.   Gastrointestinal:  Negative for abdominal pain, constipation, diarrhea, nausea and vomiting.   Genitourinary:  Negative for difficulty urinating.       Objective   BP (!) 142/98   Pulse 100   Temp 36.7 °C (98 °F)   Resp 16   Wt 124 kg (274 lb)   SpO2 96%   BMI 38.32 kg/m²     Physical Exam  Constitutional:       General: He is not in acute distress.     Appearance: He is obese.   HENT:      Head: Normocephalic and atraumatic.   Eyes:      Conjunctiva/sclera: Conjunctivae normal.   Cardiovascular:      Rate and Rhythm: Normal rate and regular rhythm.   Pulmonary:      Effort: Pulmonary effort is normal. No respiratory distress.      Breath sounds: Normal breath sounds.   Abdominal:      General: Bowel sounds are normal. There is no distension.      Palpations: Abdomen is soft.      Tenderness: There is no abdominal tenderness.      Comments: Ostomy draining liquid brown   Genitourinary:     Comments: Suprapubic cath draining clear yellow  Musculoskeletal:         General: Normal range  of motion.      Right lower leg: No edema.      Left lower leg: No edema.   Skin:     General: Skin is warm and dry.   Neurological:      General: No focal deficit present.      Mental Status: He is alert. Mental status is at baseline.   Psychiatric:         Mood and Affect: Mood normal.         Behavior: Behavior normal.         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  We will continue with restorative and supportive care as patient tolerates    B.  Laboratory examinations will next be due in April 2024 or as needed preoperatively for the suprapubic catheter revision    C.  We will look through the patient's blood pressures but I suspect that he is having hypertension now and needs to be treated either with an ACE inhibitor or a dihydropyridine calcium channel blocker    D.  The patient's prognosis is guarded.

## 2023-10-26 ENCOUNTER — TELEPHONE (OUTPATIENT)
Dept: POST ACUTE CARE | Facility: EXTERNAL LOCATION | Age: 53
End: 2023-10-26
Payer: MEDICARE

## 2023-10-26 ASSESSMENT — ENCOUNTER SYMPTOMS
DIFFICULTY URINATING: 0
ABDOMINAL PAIN: 0
FEVER: 0
DIARRHEA: 0
NAUSEA: 0
FATIGUE: 0
VOMITING: 0
CONSTIPATION: 0
SHORTNESS OF BREATH: 0
PALPITATIONS: 0
CHILLS: 0
COUGH: 0

## 2023-10-26 NOTE — TELEPHONE ENCOUNTER
RADHA LUNA,,  CAN YOU REVIEW MR. HARDIN'S BLOOD PRESSURES OVER LAST 6 MONTH?  I SUSPECT HE HAS HYPERTENSION NOW, AND SHOULD BE TREATED WITH AN ACE-INHIBITOR, OR IF THAT ISN'T TOLERATED AMLODIPINE, THX

## 2023-11-06 ENCOUNTER — APPOINTMENT (OUTPATIENT)
Dept: RADIOLOGY | Facility: HOSPITAL | Age: 53
End: 2023-11-06
Payer: MEDICARE

## 2023-11-14 DIAGNOSIS — G40.909 SEIZURE DISORDER (MULTI): Primary | ICD-10-CM

## 2023-11-14 DIAGNOSIS — G40.909 SEIZURE DISORDER (MULTI): ICD-10-CM

## 2023-11-14 DIAGNOSIS — F41.9 ANXIETY: ICD-10-CM

## 2023-11-14 RX ORDER — CLONAZEPAM 0.5 MG/1
0.5 TABLET ORAL 2 TIMES DAILY
Qty: 60 TABLET | Refills: 5 | Status: SHIPPED | OUTPATIENT
Start: 2023-11-14 | End: 2024-03-18 | Stop reason: SDUPTHER

## 2023-11-14 RX ORDER — DIAZEPAM 10 MG/2G
10 GEL RECTAL AS NEEDED
Qty: 1 EACH | Refills: 5 | Status: SHIPPED | OUTPATIENT
Start: 2023-11-14 | End: 2023-11-14 | Stop reason: SDUPTHER

## 2023-11-14 RX ORDER — DIAZEPAM 10 MG/2G
10 GEL RECTAL AS NEEDED
Qty: 1 EACH | Refills: 5 | Status: SHIPPED | OUTPATIENT
Start: 2023-11-14 | End: 2024-03-18 | Stop reason: SDUPTHER

## 2023-11-17 ENCOUNTER — NURSING HOME VISIT (OUTPATIENT)
Dept: POST ACUTE CARE | Facility: EXTERNAL LOCATION | Age: 53
End: 2023-11-17
Payer: MEDICARE

## 2023-11-17 DIAGNOSIS — Z00.00 ROUTINE GENERAL MEDICAL EXAMINATION AT HEALTH CARE FACILITY: ICD-10-CM

## 2023-11-17 DIAGNOSIS — F29 UNSP PSYCHOSIS NOT DUE TO A SUBSTANCE OR KNOWN PHYSIOL COND (MULTI): ICD-10-CM

## 2023-11-17 DIAGNOSIS — G40.909 SEIZURE DISORDER (MULTI): ICD-10-CM

## 2023-11-17 DIAGNOSIS — Z93.59 SUPRAPUBIC CATHETER (MULTI): ICD-10-CM

## 2023-11-17 DIAGNOSIS — Z00.00 MEDICARE ANNUAL WELLNESS VISIT, SUBSEQUENT: Primary | ICD-10-CM

## 2023-11-17 DIAGNOSIS — F39 MOOD DISORDER (CMS-HCC): ICD-10-CM

## 2023-11-17 DIAGNOSIS — Q07.9 CONGENITAL ENCEPHALOPATHY (MULTI): ICD-10-CM

## 2023-11-17 PROCEDURE — 99309 SBSQ NF CARE MODERATE MDM 30: CPT | Performed by: INTERNAL MEDICINE

## 2023-11-17 NOTE — LETTER
Patient: Nato Downs  : 1970    Encounter Date: 2023     Visit  Note   Subjective  Naot Donws is a 53 y.o. male who is being seen and evaluated for a Medicare Annual Wellness Visit.  -Past Medical, Surgical, and Family History were reviewed and updated in chart.  -All medications were reviewed in the local facility record (such as prescriptions, OTCs, herbal therapies and supplements).  -Nursing assessments/notes were reviewed in the local facility record.  HPI    Patient Care Team:  Amilcar Quezada MD as PCP - General (Internal Medicine)  Amilcar Quezada MD as PCP - [facility] ACO Attributed Provider     Medicare Screenings  F[place] Home Safety Risk Factors  Home Safety Risk Factors: None  Functional Ability/Level of Safety  Cognitive Impairment Observed: Cognitive impairment observed  Cognitive Impairment Reported: Cognitive impairment reported by patient or family  Nutrition and Exercise  Current Diet: Well Balanced Diet  Adequate Fluid Intake: Yes  Caffeine: No  Exercise Frequency: No Exercise  ADL Screening  Hearing - Right Ear: Functional  Hearing - Left Ear: Functional  Bathing: Dependent  Dressing: Dependent  Walks in Home: Dependent  IADL's  Managing Finances: Total care  Grocery Shopping: Total care  Taking Medication: Total care  Doing Housework: Total care     This 53-year-old male patient is resting comfortably in his bed in no distress.  He has no new complaints for me.  Nursing has no new adverse events reported to me.  The patient is being scheduled for suprapubic catheter revision next week.  The patient's controlled substances have been sent in via electronic prescribing services.      Current high risk medication:  Clonazepam  Diastat  Ditropan  Keppra  Risperidone  Trileptal    Laboratory examination from 2023:  Chest x-ray negative  Basic metabolic panel normal  Hemoglobin 12.5  Laboratory examination from 2023:  Vitamin D 44  Keppra 33  Trileptal  23  Albumin 3.9  Laboratory test normal    Review of Systems   Constitutional:  Negative for chills, fatigue and fever.   Respiratory:  Negative for cough and shortness of breath.    Cardiovascular:  Negative for chest pain and palpitations.   Gastrointestinal:  Negative for abdominal pain, constipation, diarrhea, nausea and vomiting.   Genitourinary:  Negative for difficulty urinating.     Objective  Visit Vitals  /78   Pulse 70   Temp 36.6 °C (97.9 °F)   Resp 18   Wt 124 kg (274 lb)   SpO2 97%   BMI 38.32 kg/m²   BSA 2.49 m²      Physical Exam  Constitutional:       General: He is not in acute distress.     Appearance: He is obese.   HENT:      Head: Normocephalic and atraumatic.   Eyes:      Conjunctiva/sclera: Conjunctivae normal.   Cardiovascular:      Rate and Rhythm: Normal rate and regular rhythm.   Pulmonary:      Effort: Pulmonary effort is normal. No respiratory distress.      Breath sounds: Normal breath sounds.   Abdominal:      General: Bowel sounds are normal. There is no distension.      Palpations: Abdomen is soft.      Tenderness: There is no abdominal tenderness.      Comments: Ostomy draining liquid brown   Genitourinary:     Comments: Suprapubic cath draining clear yellow  Musculoskeletal:         General: Normal range of motion.      Right lower leg: No edema.      Left lower leg: No edema.   Skin:     General: Skin is warm and dry.   Neurological:      General: No focal deficit present.      Mental Status: He is alert. Mental status is at baseline.   Psychiatric:         Mood and Affect: Mood normal.         Behavior: Behavior normal.       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  We will continue with restorative and supportive care as the patient tolerates    B.  Laboratory examinations will next be due in January 2024 or as needed    C.  The patient's prognosis is guarded.    D.  The patient's controlled substances will be sent in via electronic prescriptive services when required      Electronically Signed By: Amilcar Quezada MD   12/14/23  3:28 PM

## 2023-11-21 ENCOUNTER — HOSPITAL ENCOUNTER (OUTPATIENT)
Dept: RADIOLOGY | Facility: HOSPITAL | Age: 53
Discharge: HOME | End: 2023-11-21
Payer: MEDICARE

## 2023-11-21 ENCOUNTER — PREP FOR PROCEDURE (OUTPATIENT)
Dept: RADIOLOGY | Facility: HOSPITAL | Age: 53
End: 2023-11-21

## 2023-11-21 VITALS
HEART RATE: 74 BPM | RESPIRATION RATE: 16 BRPM | TEMPERATURE: 99.9 F | OXYGEN SATURATION: 94 % | DIASTOLIC BLOOD PRESSURE: 68 MMHG | SYSTOLIC BLOOD PRESSURE: 136 MMHG

## 2023-11-21 DIAGNOSIS — T83.010D SUPRAPUBIC CATHETER DYSFUNCTION, SUBSEQUENT ENCOUNTER: Primary | ICD-10-CM

## 2023-11-21 DIAGNOSIS — R33.8 OTHER RETENTION OF URINE: Primary | ICD-10-CM

## 2023-11-21 LAB
ALBUMIN SERPL BCP-MCNC: 3.8 G/DL (ref 3.4–5)
ALP SERPL-CCNC: 87 U/L (ref 33–120)
ALT SERPL W P-5'-P-CCNC: 19 U/L (ref 10–52)
ANION GAP SERPL CALC-SCNC: 11 MMOL/L (ref 10–20)
AST SERPL W P-5'-P-CCNC: 11 U/L (ref 9–39)
BILIRUB SERPL-MCNC: 0.4 MG/DL (ref 0–1.2)
BUN SERPL-MCNC: 11 MG/DL (ref 6–23)
CALCIUM SERPL-MCNC: 9 MG/DL (ref 8.6–10.3)
CHLORIDE SERPL-SCNC: 100 MMOL/L (ref 98–107)
CO2 SERPL-SCNC: 28 MMOL/L (ref 21–32)
CREAT SERPL-MCNC: 1 MG/DL (ref 0.5–1.3)
ERYTHROCYTE [DISTWIDTH] IN BLOOD BY AUTOMATED COUNT: 14 % (ref 11.5–14.5)
GFR SERPL CREATININE-BSD FRML MDRD: 89 ML/MIN/1.73M*2
GLUCOSE SERPL-MCNC: 98 MG/DL (ref 74–99)
HCT VFR BLD AUTO: 38.6 % (ref 41–52)
HGB BLD-MCNC: 12.4 G/DL (ref 13.5–17.5)
INR PPP: 1.1 (ref 0.9–1.1)
MCH RBC QN AUTO: 27.9 PG (ref 26–34)
MCHC RBC AUTO-ENTMCNC: 32.1 G/DL (ref 32–36)
MCV RBC AUTO: 87 FL (ref 80–100)
NRBC BLD-RTO: 0 /100 WBCS (ref 0–0)
PLATELET # BLD AUTO: 339 X10*3/UL (ref 150–450)
POTASSIUM SERPL-SCNC: 3.9 MMOL/L (ref 3.5–5.3)
PROT SERPL-MCNC: 7 G/DL (ref 6.4–8.2)
PROTHROMBIN TIME: 12.3 SECONDS (ref 9.8–12.8)
RBC # BLD AUTO: 4.44 X10*6/UL (ref 4.5–5.9)
SODIUM SERPL-SCNC: 135 MMOL/L (ref 136–145)
WBC # BLD AUTO: 10.7 X10*3/UL (ref 4.4–11.3)

## 2023-11-21 PROCEDURE — 51701 INSERT BLADDER CATHETER: CPT | Performed by: RADIOLOGY

## 2023-11-21 PROCEDURE — 75984 XRAY CONTROL CATHETER CHANGE: CPT

## 2023-11-21 PROCEDURE — 36415 COLL VENOUS BLD VENIPUNCTURE: CPT | Performed by: RADIOLOGY

## 2023-11-21 PROCEDURE — 80053 COMPREHEN METABOLIC PANEL: CPT | Performed by: RADIOLOGY

## 2023-11-21 PROCEDURE — 51705 CHANGE OF BLADDER TUBE: CPT

## 2023-11-21 PROCEDURE — 7100000002 HC RECOVERY ROOM TIME - EACH INCREMENTAL 1 MINUTE

## 2023-11-21 PROCEDURE — C1769 GUIDE WIRE: HCPCS

## 2023-11-21 PROCEDURE — 2500000004 HC RX 250 GENERAL PHARMACY W/ HCPCS (ALT 636 FOR OP/ED): Performed by: RADIOLOGY

## 2023-11-21 PROCEDURE — 75989 ABSCESS DRAINAGE UNDER X-RAY: CPT | Performed by: RADIOLOGY

## 2023-11-21 PROCEDURE — 51600 INJECTION FOR BLADDER X-RAY: CPT

## 2023-11-21 PROCEDURE — 49423 EXCHANGE DRAINAGE CATHETER: CPT

## 2023-11-21 PROCEDURE — 99152 MOD SED SAME PHYS/QHP 5/>YRS: CPT

## 2023-11-21 PROCEDURE — 85027 COMPLETE CBC AUTOMATED: CPT | Performed by: RADIOLOGY

## 2023-11-21 PROCEDURE — 2720000007 HC OR 272 NO HCPCS

## 2023-11-21 PROCEDURE — 99153 MOD SED SAME PHYS/QHP EA: CPT

## 2023-11-21 PROCEDURE — 49406 IMAGE CATH FLUID PERI/RETRO: CPT

## 2023-11-21 PROCEDURE — 2550000001 HC RX 255 CONTRASTS: Performed by: RADIOLOGY

## 2023-11-21 PROCEDURE — 7100000001 HC RECOVERY ROOM TIME - INITIAL BASE CHARGE

## 2023-11-21 PROCEDURE — 99152 MOD SED SAME PHYS/QHP 5/>YRS: CPT | Performed by: RADIOLOGY

## 2023-11-21 PROCEDURE — 99153 MOD SED SAME PHYS/QHP EA: CPT | Performed by: RADIOLOGY

## 2023-11-21 PROCEDURE — C1729 CATH, DRAINAGE: HCPCS

## 2023-11-21 PROCEDURE — 85610 PROTHROMBIN TIME: CPT | Performed by: RADIOLOGY

## 2023-11-21 PROCEDURE — C1894 INTRO/SHEATH, NON-LASER: HCPCS

## 2023-11-21 RX ORDER — RISPERIDONE 1 MG/1
1 TABLET ORAL 2 TIMES DAILY
COMMUNITY

## 2023-11-21 RX ORDER — ACETAMINOPHEN 325 MG/1
325 TABLET ORAL EVERY 6 HOURS PRN
COMMUNITY

## 2023-11-21 RX ORDER — FENTANYL CITRATE 50 UG/ML
INJECTION, SOLUTION INTRAMUSCULAR; INTRAVENOUS AS NEEDED
Status: COMPLETED | OUTPATIENT
Start: 2023-11-21 | End: 2023-11-21

## 2023-11-21 RX ORDER — LANOLIN ALCOHOL/MO/W.PET/CERES
500 CREAM (GRAM) TOPICAL DAILY
COMMUNITY

## 2023-11-21 RX ORDER — OMEPRAZOLE 20 MG/1
20 TABLET, DELAYED RELEASE ORAL
COMMUNITY

## 2023-11-21 RX ORDER — FERROUS SULFATE, DRIED 160(50) MG
1 TABLET, EXTENDED RELEASE ORAL DAILY
COMMUNITY

## 2023-11-21 RX ORDER — MIDAZOLAM HYDROCHLORIDE 1 MG/ML
INJECTION, SOLUTION INTRAMUSCULAR; INTRAVENOUS AS NEEDED
Status: COMPLETED | OUTPATIENT
Start: 2023-11-21 | End: 2023-11-21

## 2023-11-21 RX ORDER — WHEAT DEXTRIN 5 G/7.4 G
10 POWDER (GRAM) ORAL DAILY
COMMUNITY

## 2023-11-21 RX ORDER — CIPROFLOXACIN 2 MG/ML
INJECTION, SOLUTION INTRAVENOUS CONTINUOUS PRN
Status: COMPLETED | OUTPATIENT
Start: 2023-11-21 | End: 2023-11-21

## 2023-11-21 RX ORDER — LEVETIRACETAM 1000 MG/1
1000 TABLET ORAL 3 TIMES DAILY
COMMUNITY

## 2023-11-21 RX ORDER — OXCARBAZEPINE 600 MG/1
600 TABLET, FILM COATED ORAL 2 TIMES DAILY
COMMUNITY

## 2023-11-21 RX ADMIN — MIDAZOLAM 1 MG: 1 INJECTION INTRAMUSCULAR; INTRAVENOUS at 10:17

## 2023-11-21 RX ADMIN — CIPROFLOXACIN 400 MG: 2 INJECTION, SOLUTION INTRAVENOUS at 10:20

## 2023-11-21 RX ADMIN — IOHEXOL 100 ML: 350 INJECTION, SOLUTION INTRAVENOUS at 11:22

## 2023-11-21 RX ADMIN — FENTANYL CITRATE 50 MCG: 50 INJECTION, SOLUTION INTRAMUSCULAR; INTRAVENOUS at 10:18

## 2023-11-21 ASSESSMENT — PAIN SCALES - GENERAL

## 2023-11-21 NOTE — POST-PROCEDURE NOTE
Interventional Radiology Post-Procedure Note    Suprapubic catheter placement      Indication for procedure: The encounter diagnosis was Other retention of urine.    Pre-Procedure Verification and Time Out:  · Procedure Location procedure area   · HUDDLE - Pre-procedure Verification completed   · TIME OUT - Final Verification completed immediately prior to procedure start   · DEBRIEF completed     General Information:  Date/Time of Procedure: 11/21/23 at 11:14 AM  Indication(s)/Pre - Procedure Diagnoses: neurogenic bladder  Post-Procedure Diagnosis: neurogenic bladder  Procedure Name: Suprapubic catheter placement  Findings: See PACS  Procedure performed by: Vega Díaz MD   Assistant(s): Dr. Amilcar Souza MD  Estimated Blood Loss (mL): minimal  Specimen: No  Informed Consent: written consent obtained    Procedure Details:    Technically successful and uncomplicated suprapubic catheter placement and removal of existing suprapubic catheter.  Please see PACS for full procedural details.    Patient Tolerance: good  Complications: None    Prep:  Ultrasound Guided Insertion: Yes  Large Drape, Hand Hygiene, Surgical Cap, Surgical Mask, Sterile Gown, Sterile Gloves, Glasses, and Scrubs  Patient Position: Supine  Site Prep: chlorhexidine, draped, usual sterile procedure followed    Anesthesia/Medications:  Procedural Sedation:  Fentanyl: 50 mcg  Versed: 1 mg  1% Lidocaine: 8 mL    Attending Attestation:  I was present for the entire procedure    Vega Díaz MD, PGY-5  Interventional Radiology

## 2023-11-21 NOTE — PRE-PROCEDURE NOTE
Interventional Radiology Preprocedure Note    Indication for procedure: The encounter diagnosis was Other retention of urine.    This is a patient with a history of chronic indwelling suprapubic catheter since 2006. He has experience pericatheter leaking which has been managed by sequential upsizing of the tube. We have reached the outer limits of the tube size and the patient continues to leak. I have been asked by Dr. Paniagua to place a new suprapubic tube in this patient so the old tube can be removed and the tract healed. This was discussed with the patient and his sister.    Relevant review of systems:  leaking around the indwelling SPT    Relevant Labs:   Lab Results   Component Value Date    CREATININE 1.00 11/21/2023    EGFR 89 11/21/2023    INR 1.1 11/21/2023    PROTIME 12.3 11/21/2023       Planned Sedation/Anesthesia: Moderate    Airway assessment:  large neck    Directed physical examination:    Awake and cooperative, sister present  No acute distress  Regular rate, rhythm  Breathing non-labored      Mallampati: III (soft and hard palate and base of uvula visible)    ASA Score: ASA 2 - Patient with mild systemic disease with no functional limitations    Benefits, risks and alternatives of procedure and planned sedation have been discussed with the patient and/or their representative. All questions answered and they agree to proceed.

## 2023-11-21 NOTE — PRE-PROCEDURE NOTE
INTERVENTIONAL RADIOLOGY PRE-PROCEDURE NOTE    Nato Downs is a 53 y.o. male with PMHx of neurogenic bladder who presents to the interventional radiology department for new suprapubic catheter placement 2/2 leakage around existing suprapubic catheter placement.    Procedure: Suprapubic catheter placement    Indication for procedure: The encounter diagnosis was Other retention of urine.    Past Medical History:   Diagnosis Date    Auditory hallucinations     Encephalopathy     Epilepsy (CMS/HCC)     Neurogenic bladder     Neurogenic bowel     Other specified health status     No pertinent past medical history      Past Surgical History:   Procedure Laterality Date    OTHER SURGICAL HISTORY  12/21/2015    Laparoscopy Total Colectomy       Relevant Labs:   Lab Results   Component Value Date    CREATININE 1.00 11/21/2023    EGFR 89 11/21/2023    INR 1.1 11/21/2023    PROTIME 12.3 11/21/2023       Planned Sedation/Anesthesia: Moderate    Directed physical examination:    General Appearance: No acute distress, lying in bed comfortably  Heart: Heart regular rate and rhythm  Lungs: No increased work of breathing  Abdomen: soft, nontender, and midline suprapubic catheter with leakage around the tube      Current Outpatient Medications:     acetaminophen (Tylenol) 325 mg tablet, Take 1 tablet (325 mg) by mouth every 6 hours if needed for mild pain (1 - 3)., Disp: , Rfl:     ascorbic acid (Vitamin C) 500 mg ER capsule, Take 1 capsule (500 mg) by mouth once daily., Disp: , Rfl:     calcium carbonate-vitamin D3 500 mg-5 mcg (200 unit) tablet, Take 1 tablet by mouth once daily., Disp: , Rfl:     clonazePAM (KlonoPIN) 0.5 mg tablet, Take 1 tablet (0.5 mg) by mouth 2 times a day., Disp: 60 tablet, Rfl: 5    diazePAM (Diastat Acudial) 5-7.5-10 mg rectal kit, Insert 10 mg into the rectum if needed for seizures. Prior to administration, review instruction sheet supplied with dose unit. Verify the ordered dose is set for  administration., Disp: 1 each, Rfl: 5    levETIRAcetam (Keppra) 1,000 mg tablet, Take 1 tablet (1,000 mg) by mouth 3 times a day., Disp: , Rfl:     omeprazole OTC (PriLOSEC OTC) 20 mg EC tablet, Take 1 tablet (20 mg) by mouth once daily in the morning. Take before meals. Do not crush, chew, or split., Disp: , Rfl:     OXcarbazepine (Trileptal) 600 mg tablet, Take 1 tablet (600 mg) by mouth 2 times a day., Disp: , Rfl:     risperiDONE (RisperDAL) 1 mg tablet, Take 1 tablet (1 mg) by mouth 2 times a day., Disp: , Rfl:     wheat dextrin (Benefiber Healthy Shape) 5 gram/7.4 gram powder, Take 10 mL by mouth once daily., Disp: , Rfl:   No current facility-administered medications for this encounter.     Mallampati: III (soft and hard palate and base of uvula visible)    ASA Score: ASA 2 - Patient with mild systemic disease with no functional limitations    Benefits, risks and alternatives of procedure and planned sedation have been discussed with the patient and/or their representative. All questions answered and they agree to proceed.     Vega Díaz MD, PGY-5  Vascular & Interventional Radiology  IR pager: [phone]    NON-Urgent on call weekends and after hours weekdays (5pm - 5am) IR pager: [phone]  Urgent & emergent on call weekends and after hours weekdays (5pm-7am) IR pager: [phone]

## 2023-11-21 NOTE — Clinical Note
New 16 Romanian suprapubic pigtail Mermaid drain inserted and existing suprapubic catheter removed.  New drain sutured in place and connected to leg bad draining peña urine. Site dressed with 4x4 and tegaderm

## 2023-11-22 ENCOUNTER — NURSING HOME VISIT (OUTPATIENT)
Dept: POST ACUTE CARE | Facility: EXTERNAL LOCATION | Age: 53
End: 2023-11-22
Payer: MEDICARE

## 2023-11-22 VITALS
WEIGHT: 275 LBS | SYSTOLIC BLOOD PRESSURE: 154 MMHG | TEMPERATURE: 98 F | HEART RATE: 75 BPM | RESPIRATION RATE: 18 BRPM | DIASTOLIC BLOOD PRESSURE: 90 MMHG | BODY MASS INDEX: 38.46 KG/M2 | OXYGEN SATURATION: 96 %

## 2023-11-22 DIAGNOSIS — Z93.59 SUPRAPUBIC CATHETER (MULTI): Primary | ICD-10-CM

## 2023-11-22 DIAGNOSIS — Z93.2 ILEOSTOMY IN PLACE (MULTI): ICD-10-CM

## 2023-11-22 DIAGNOSIS — G40.909 SEIZURE DISORDER (MULTI): ICD-10-CM

## 2023-11-22 PROCEDURE — 99309 SBSQ NF CARE MODERATE MDM 30: CPT | Performed by: NURSE PRACTITIONER

## 2023-11-22 ASSESSMENT — ENCOUNTER SYMPTOMS
FATIGUE: 0
ABDOMINAL PAIN: 0
SHORTNESS OF BREATH: 0
COUGH: 0
FEVER: 0
PALPITATIONS: 0
CHILLS: 0
DIFFICULTY URINATING: 0
CONSTIPATION: 0
VOMITING: 0
DIARRHEA: 0
NAUSEA: 0

## 2023-11-22 NOTE — LETTER
Patient: Nato Downs  : 1970    Encounter Date: 2023    Subjective  Nato Downs is a 53 y.o. male Here to follow up on revision of suprapubic cath site.   HPI  He had persistent leaking of suprapubic cath site and had new suprapubic cath site placed yesterday through interventional radiology.  He tolerated procedure well.   Grzegorz is resting in bed, states he is feeling well.  No concerns per staff.     Review of Systems   Constitutional:  Negative for chills, fatigue and fever.   Respiratory:  Negative for cough and shortness of breath.    Cardiovascular:  Negative for chest pain and palpitations.   Gastrointestinal:  Negative for abdominal pain, constipation, diarrhea, nausea and vomiting.   Genitourinary:  Negative for difficulty urinating.       Objective  /90   Pulse 75   Temp 36.7 °C (98 °F)   Resp 18   Wt 125 kg (275 lb)   SpO2 96%   BMI 38.46 kg/m²     Physical Exam  Constitutional:       General: He is not in acute distress.     Appearance: He is obese.   HENT:      Head: Normocephalic and atraumatic.   Eyes:      Conjunctiva/sclera: Conjunctivae normal.   Cardiovascular:      Rate and Rhythm: Normal rate and regular rhythm.   Pulmonary:      Effort: Pulmonary effort is normal. No respiratory distress.      Breath sounds: Normal breath sounds.   Abdominal:      General: Bowel sounds are normal. There is no distension.      Palpations: Abdomen is soft.      Tenderness: There is no abdominal tenderness.      Comments: Ostomy draining liquid brown   Genitourinary:     Comments: Suprapubic cath draining clear yellow, insertion site has dressing that is clean and dry and intact.   Musculoskeletal:         General: Normal range of motion.      Right lower leg: No edema.      Left lower leg: No edema.   Skin:     General: Skin is warm and dry.   Neurological:      General: No focal deficit present.      Mental Status: He is alert. Mental status is at baseline.   Psychiatric:          Mood and Affect: Mood normal.         Behavior: Behavior normal.         Assessment/Plan  Problem List Items Addressed This Visit       Ileostomy in place (CMS/HCC)     Functioning well.           Seizure disorder (CMS/HCC)     no known recent seizures.  Staff to monitor and notify for any seizure activity.             Suprapubic catheter (CMS/HCC) - Primary     He had suprapubic cath site revision yesterday through interventional radiology, tolerated procedure well and suprapubic cath functioning well.   Dressing to new site clean dry and intact.         labs/meds/orders reviewed  staff to monitor and notify for any changes.  continue with supportive and restorative care  next labs 1/24 and prn.       Electronically Signed By: CHARLA Larios-HANH   11/22/23  8:52 PM

## 2023-11-23 NOTE — PROGRESS NOTES
Subjective   Nato Downs is a 53 y.o. male Here to follow up on revision of suprapubic cath site.   HPI  He had persistent leaking of suprapubic cath site and had new suprapubic cath site placed yesterday through interventional radiology.  He tolerated procedure well.   Grzegorz is resting in bed, states he is feeling well.  No concerns per staff.     Review of Systems   Constitutional:  Negative for chills, fatigue and fever.   Respiratory:  Negative for cough and shortness of breath.    Cardiovascular:  Negative for chest pain and palpitations.   Gastrointestinal:  Negative for abdominal pain, constipation, diarrhea, nausea and vomiting.   Genitourinary:  Negative for difficulty urinating.       Objective   /90   Pulse 75   Temp 36.7 °C (98 °F)   Resp 18   Wt 125 kg (275 lb)   SpO2 96%   BMI 38.46 kg/m²     Physical Exam  Constitutional:       General: He is not in acute distress.     Appearance: He is obese.   HENT:      Head: Normocephalic and atraumatic.   Eyes:      Conjunctiva/sclera: Conjunctivae normal.   Cardiovascular:      Rate and Rhythm: Normal rate and regular rhythm.   Pulmonary:      Effort: Pulmonary effort is normal. No respiratory distress.      Breath sounds: Normal breath sounds.   Abdominal:      General: Bowel sounds are normal. There is no distension.      Palpations: Abdomen is soft.      Tenderness: There is no abdominal tenderness.      Comments: Ostomy draining liquid brown   Genitourinary:     Comments: Suprapubic cath draining clear yellow, insertion site has dressing that is clean and dry and intact.   Musculoskeletal:         General: Normal range of motion.      Right lower leg: No edema.      Left lower leg: No edema.   Skin:     General: Skin is warm and dry.   Neurological:      General: No focal deficit present.      Mental Status: He is alert. Mental status is at baseline.   Psychiatric:         Mood and Affect: Mood normal.         Behavior: Behavior normal.          Assessment/Plan   Problem List Items Addressed This Visit       Ileostomy in place (CMS/[facility])     Functioning well.           Seizure disorder (CMS/[facility])     no known recent seizures.  Staff to monitor and notify for any seizure activity.             Suprapubic catheter (CMS/[facility]) - Primary     He had suprapubic cath site revision yesterday through interventional radiology, tolerated procedure well and suprapubic cath functioning well.   Dressing to new site clean dry and intact.         labs/meds/orders reviewed  staff to monitor and notify for any changes.  continue with supportive and restorative care  next labs 1/24 and prn.

## 2023-11-23 NOTE — ASSESSMENT & PLAN NOTE
He had suprapubic cath site revision yesterday through interventional radiology, tolerated procedure well and suprapubic cath functioning well.   Dressing to new site clean dry and intact.

## 2023-12-14 VITALS
RESPIRATION RATE: 18 BRPM | HEART RATE: 70 BPM | BODY MASS INDEX: 38.32 KG/M2 | DIASTOLIC BLOOD PRESSURE: 78 MMHG | TEMPERATURE: 97.9 F | SYSTOLIC BLOOD PRESSURE: 134 MMHG | WEIGHT: 274 LBS | OXYGEN SATURATION: 97 %

## 2023-12-14 PROBLEM — Z00.00 MEDICARE ANNUAL WELLNESS VISIT, SUBSEQUENT: Status: ACTIVE | Noted: 2023-12-14

## 2023-12-14 ASSESSMENT — ACTIVITIES OF DAILY LIVING (ADL)
MANAGING_FINANCES: TOTAL CARE
TAKING_MEDICATION: TOTAL CARE
DOING_HOUSEWORK: TOTAL CARE
GROCERY_SHOPPING: TOTAL CARE
BATHING: DEPENDENT
DRESSING: DEPENDENT

## 2023-12-14 ASSESSMENT — ENCOUNTER SYMPTOMS
OCCASIONAL FEELINGS OF UNSTEADINESS: 1
CHILLS: 0
CONSTIPATION: 0
ABDOMINAL PAIN: 0
FEVER: 0
DIFFICULTY URINATING: 0
PALPITATIONS: 0
FATIGUE: 0
COUGH: 0
SHORTNESS OF BREATH: 0
DIARRHEA: 0
NAUSEA: 0
VOMITING: 0
DEPRESSION: 0
LOSS OF SENSATION IN FEET: 0

## 2023-12-14 NOTE — PROGRESS NOTES
Visit  Note   Subjective   Nato Downs is a 53 y.o. male who is being seen and evaluated for a Medicare Annual Wellness Visit.  -Past Medical, Surgical, and Family History were reviewed and updated in chart.  -All medications were reviewed in the local facility record (such as prescriptions, OTCs, herbal therapies and supplements).  -Nursing assessments/notes were reviewed in the local facility record.  HPI    Patient Care Team:  Amilcar Quezada MD as PCP - General (Internal Medicine)  Amilcar Quezada MD as PCP - [facility] ACO Attributed Provider     Medicare Screenings  [place] [facility] Safety Risk Factors  Home Safety Risk Factors: None  Functional Ability/Level of Safety  Cognitive Impairment Observed: Cognitive impairment observed  Cognitive Impairment Reported: Cognitive impairment reported by patient or family  Nutrition and Exercise  Current Diet: Well Balanced Diet  Adequate Fluid Intake: Yes  Caffeine: No  Exercise Frequency: No Exercise  ADL Screening  Hearing - Right Ear: Functional  Hearing - Left Ear: Functional  Bathing: Dependent  Dressing: Dependent  Walks in Home: Dependent  IADL's  Managing Finances: Total care  Grocery Shopping: Total care  Taking Medication: Total care  Doing Housework: Total care     This 53-year-old male patient is resting comfortably in his bed in no distress.  He has no new complaints for me.  Nursing has no new adverse events reported to me.  The patient is being scheduled for suprapubic catheter revision next week.  The patient's controlled substances have been sent in via electronic prescribing services.      Current high risk medication:  Clonazepam  Diastat  Ditropan  Keppra  Risperidone  Trileptal    Laboratory examination from October 2023:  Chest x-ray negative  Basic metabolic panel normal  Hemoglobin 12.5  Laboratory examination from July 2023:  Vitamin D 44  Keppra 33  Trileptal 23  Albumin 3.9  Laboratory test normal    Review of Systems   Constitutional:   Negative for chills, fatigue and fever.   Respiratory:  Negative for cough and shortness of breath.    Cardiovascular:  Negative for chest pain and palpitations.   Gastrointestinal:  Negative for abdominal pain, constipation, diarrhea, nausea and vomiting.   Genitourinary:  Negative for difficulty urinating.     Objective   Visit Vitals  /78   Pulse 70   Temp 36.6 °C (97.9 °F)   Resp 18   Wt 124 kg (274 lb)   SpO2 97%   BMI 38.32 kg/m²   BSA 2.49 m²      Physical Exam  Constitutional:       General: He is not in acute distress.     Appearance: He is obese.   HENT:      Head: Normocephalic and atraumatic.   Eyes:      Conjunctiva/sclera: Conjunctivae normal.   Cardiovascular:      Rate and Rhythm: Normal rate and regular rhythm.   Pulmonary:      Effort: Pulmonary effort is normal. No respiratory distress.      Breath sounds: Normal breath sounds.   Abdominal:      General: Bowel sounds are normal. There is no distension.      Palpations: Abdomen is soft.      Tenderness: There is no abdominal tenderness.      Comments: Ostomy draining liquid brown   Genitourinary:     Comments: Suprapubic cath draining clear yellow  Musculoskeletal:         General: Normal range of motion.      Right lower leg: No edema.      Left lower leg: No edema.   Skin:     General: Skin is warm and dry.   Neurological:      General: No focal deficit present.      Mental Status: He is alert. Mental status is at baseline.   Psychiatric:         Mood and Affect: Mood normal.         Behavior: Behavior normal.       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  Laboratory examinations will next be due in January 2024 or as needed    C.  The patient's prognosis is guarded.    D.  The patient's controlled substances will be sent in via electronic prescriptive services when required

## 2023-12-15 ENCOUNTER — NURSING HOME VISIT (OUTPATIENT)
Dept: POST ACUTE CARE | Facility: EXTERNAL LOCATION | Age: 53
End: 2023-12-15
Payer: MEDICARE

## 2023-12-15 VITALS
HEART RATE: 74 BPM | SYSTOLIC BLOOD PRESSURE: 132 MMHG | WEIGHT: 275 LBS | TEMPERATURE: 97.9 F | OXYGEN SATURATION: 96 % | RESPIRATION RATE: 18 BRPM | BODY MASS INDEX: 38.46 KG/M2 | DIASTOLIC BLOOD PRESSURE: 80 MMHG

## 2023-12-15 DIAGNOSIS — Q07.9 CONGENITAL ENCEPHALOPATHY (MULTI): ICD-10-CM

## 2023-12-15 DIAGNOSIS — T83.010D SUPRAPUBIC CATHETER DYSFUNCTION, SUBSEQUENT ENCOUNTER: Primary | ICD-10-CM

## 2023-12-15 DIAGNOSIS — F79 ACQUIRED INTELLECTUAL DISABILITY: ICD-10-CM

## 2023-12-15 DIAGNOSIS — R53.81 PHYSICAL DEBILITY: ICD-10-CM

## 2023-12-15 DIAGNOSIS — G40.909 SEIZURE DISORDER (MULTI): ICD-10-CM

## 2023-12-15 DIAGNOSIS — N31.9 NEUROGENIC BLADDER: ICD-10-CM

## 2023-12-15 PROCEDURE — 99308 SBSQ NF CARE LOW MDM 20: CPT | Performed by: INTERNAL MEDICINE

## 2023-12-15 NOTE — PROGRESS NOTES
Subjective   Patient ID: Nato Downs is a 53 y.o. male who is long term resident being seen and evaluated for multiple medical problems.    HPI   This 53-year-old male patient is resting comfortably in his bed in no distress.  He has plans through urology for revision of his suprapubic catheter in January 2024.  Nursing has no new adverse events reported to me.    Laboratory examination from October 5, 2023 have been reviewed in detail by me.    The patient's medication list has been reviewed in detail by me.    The patient's last seizure medication level was done in July 2023.    Review of Systems   Constitutional:  Negative for chills, fatigue and fever.   Respiratory:  Negative for cough and shortness of breath.    Cardiovascular:  Negative for chest pain and palpitations.   Gastrointestinal:  Negative for abdominal pain, constipation, diarrhea, nausea and vomiting.   Genitourinary:  Negative for difficulty urinating.       Objective   /80   Pulse 74   Temp 36.6 °C (97.9 °F)   Resp 18   Wt 125 kg (275 lb)   SpO2 96%   BMI 38.46 kg/m²     Physical Exam  Constitutional:       General: He is not in acute distress.     Appearance: He is obese.   HENT:      Head: Normocephalic and atraumatic.   Eyes:      Conjunctiva/sclera: Conjunctivae normal.   Cardiovascular:      Rate and Rhythm: Normal rate and regular rhythm.   Pulmonary:      Effort: Pulmonary effort is normal. No respiratory distress.      Breath sounds: Normal breath sounds.   Abdominal:      General: Bowel sounds are normal. There is no distension.      Palpations: Abdomen is soft.      Tenderness: There is no abdominal tenderness.      Comments: Ostomy draining liquid brown   Genitourinary:     Comments: Suprapubic cath draining clear yellow, insertion site has dressing that is clean and dry and intact.   Musculoskeletal:         General: Normal range of motion.      Right lower leg: No edema.      Left lower leg: No edema.   Skin:      General: Skin is warm and dry.   Neurological:      General: No focal deficit present.      Mental Status: He is alert. Mental status is at baseline.   Psychiatric:         Mood and Affect: Mood normal.         Behavior: Behavior normal.         Assessment/Plan   Problem List Items Addressed This Visit             ICD-10-CM    Acquired intellectual disability F79    Neurogenic bladder N31.9    Physical debility R53.81    Congenital encephalopathy (CMS/[facility]) Q07.9    Seizure disorder (CMS/[facility]) G40.909    Suprapubic catheter dysfunction (CMS/[facility]) - Primary T83.010A     8.  We will continue with restorative and supportive care as patient tolerates    B.  Await for follow-up after the patient's suprapubic catheter revision    C.  Laboratory examinations will next be due in April 2024 or as needed including the patient's seizure medication levels    D.  The patient's prognosis is guarded.

## 2023-12-15 NOTE — LETTER
Patient: Nato Downs  : 1970    Encounter Date: 12/15/2023    Subjective  Patient ID: Nato Downs is a 53 y.o. male who is long term resident being seen and evaluated for multiple medical problems.    HPI   This 53-year-old male patient is resting comfortably in his bed in no distress.  He has plans through urology for revision of his suprapubic catheter in 2024.  Nursing has no new adverse events reported to me.    Laboratory examination from 2023 have been reviewed in detail by me.    The patient's medication list has been reviewed in detail by me.    The patient's last seizure medication level was done in 2023.    Review of Systems   Constitutional:  Negative for chills, fatigue and fever.   Respiratory:  Negative for cough and shortness of breath.    Cardiovascular:  Negative for chest pain and palpitations.   Gastrointestinal:  Negative for abdominal pain, constipation, diarrhea, nausea and vomiting.   Genitourinary:  Negative for difficulty urinating.       Objective  /80   Pulse 74   Temp 36.6 °C (97.9 °F)   Resp 18   Wt 125 kg (275 lb)   SpO2 96%   BMI 38.46 kg/m²     Physical Exam  Constitutional:       General: He is not in acute distress.     Appearance: He is obese.   HENT:      Head: Normocephalic and atraumatic.   Eyes:      Conjunctiva/sclera: Conjunctivae normal.   Cardiovascular:      Rate and Rhythm: Normal rate and regular rhythm.   Pulmonary:      Effort: Pulmonary effort is normal. No respiratory distress.      Breath sounds: Normal breath sounds.   Abdominal:      General: Bowel sounds are normal. There is no distension.      Palpations: Abdomen is soft.      Tenderness: There is no abdominal tenderness.      Comments: Ostomy draining liquid brown   Genitourinary:     Comments: Suprapubic cath draining clear yellow, insertion site has dressing that is clean and dry and intact.   Musculoskeletal:         General: Normal range of motion.       Right lower leg: No edema.      Left lower leg: No edema.   Skin:     General: Skin is warm and dry.   Neurological:      General: No focal deficit present.      Mental Status: He is alert. Mental status is at baseline.   Psychiatric:         Mood and Affect: Mood normal.         Behavior: Behavior normal.         Assessment/Plan  Problem List Items Addressed This Visit             ICD-10-CM    Acquired intellectual disability F79    Neurogenic bladder N31.9    Physical debility R53.81    Congenital encephalopathy (CMS/[facility]) Q07.9    Seizure disorder (CMS/[facility]) G40.909    Suprapubic catheter dysfunction (CMS/[facility]) - Primary T83.010A     8.  We will continue with restorative and supportive care as patient tolerates    B.  Await for follow-up after the patient's suprapubic catheter revision    C.  Laboratory examinations will next be due in April 2024 or as needed including the patient's seizure medication levels    D.  The patient's prognosis is guarded.        Electronically Signed By: Amilcar Quezada MD   12/27/23  6:12 PM

## 2023-12-20 ENCOUNTER — HOSPITAL ENCOUNTER (OUTPATIENT)
Dept: RADIOLOGY | Facility: HOSPITAL | Age: 53
Discharge: HOME | End: 2023-12-20
Payer: MEDICARE

## 2023-12-20 VITALS
TEMPERATURE: 98 F | WEIGHT: 280 LBS | BODY MASS INDEX: 34.1 KG/M2 | DIASTOLIC BLOOD PRESSURE: 96 MMHG | RESPIRATION RATE: 16 BRPM | HEART RATE: 60 BPM | OXYGEN SATURATION: 93 % | SYSTOLIC BLOOD PRESSURE: 154 MMHG | HEIGHT: 76 IN

## 2023-12-20 DIAGNOSIS — T83.010D SUPRAPUBIC CATHETER DYSFUNCTION, SUBSEQUENT ENCOUNTER: ICD-10-CM

## 2023-12-20 LAB
INR PPP: 1 (ref 0.9–1.1)
PROTHROMBIN TIME: 11.5 SECONDS (ref 9.8–12.8)

## 2023-12-20 PROCEDURE — 75984 XRAY CONTROL CATHETER CHANGE: CPT | Performed by: RADIOLOGY

## 2023-12-20 PROCEDURE — 76000 FLUOROSCOPY <1 HR PHYS/QHP: CPT | Mod: 59

## 2023-12-20 PROCEDURE — 2720000007 HC OR 272 NO HCPCS

## 2023-12-20 PROCEDURE — 7100000001 HC RECOVERY ROOM TIME - INITIAL BASE CHARGE

## 2023-12-20 PROCEDURE — 7100000002 HC RECOVERY ROOM TIME - EACH INCREMENTAL 1 MINUTE

## 2023-12-20 PROCEDURE — 85610 PROTHROMBIN TIME: CPT | Performed by: RADIOLOGY

## 2023-12-20 PROCEDURE — 36415 COLL VENOUS BLD VENIPUNCTURE: CPT | Performed by: RADIOLOGY

## 2023-12-20 PROCEDURE — 2500000004 HC RX 250 GENERAL PHARMACY W/ HCPCS (ALT 636 FOR OP/ED): Performed by: RADIOLOGY

## 2023-12-20 PROCEDURE — C1769 GUIDE WIRE: HCPCS

## 2023-12-20 PROCEDURE — 99152 MOD SED SAME PHYS/QHP 5/>YRS: CPT | Performed by: RADIOLOGY

## 2023-12-20 PROCEDURE — 51705 CHANGE OF BLADDER TUBE: CPT | Performed by: RADIOLOGY

## 2023-12-20 PROCEDURE — 99152 MOD SED SAME PHYS/QHP 5/>YRS: CPT

## 2023-12-20 PROCEDURE — 51705 CHANGE OF BLADDER TUBE: CPT

## 2023-12-20 RX ORDER — MIDAZOLAM HYDROCHLORIDE 1 MG/ML
INJECTION, SOLUTION INTRAMUSCULAR; INTRAVENOUS
Status: COMPLETED | OUTPATIENT
Start: 2023-12-20 | End: 2023-12-20

## 2023-12-20 RX ORDER — FENTANYL CITRATE 50 UG/ML
INJECTION, SOLUTION INTRAMUSCULAR; INTRAVENOUS
Status: COMPLETED | OUTPATIENT
Start: 2023-12-20 | End: 2023-12-20

## 2023-12-20 RX ORDER — SODIUM CHLORIDE 9 MG/ML
50 INJECTION, SOLUTION INTRAVENOUS CONTINUOUS
Status: DISCONTINUED | OUTPATIENT
Start: 2023-12-20 | End: 2023-12-21 | Stop reason: HOSPADM

## 2023-12-20 RX ADMIN — FENTANYL CITRATE 50 MCG: 50 INJECTION, SOLUTION INTRAMUSCULAR; INTRAVENOUS at 08:41

## 2023-12-20 RX ADMIN — SODIUM CHLORIDE 50 ML/HR: 9 INJECTION, SOLUTION INTRAVENOUS at 08:43

## 2023-12-20 RX ADMIN — MIDAZOLAM 1 MG: 1 INJECTION INTRAMUSCULAR; INTRAVENOUS at 08:41

## 2023-12-20 ASSESSMENT — PAIN SCALES - GENERAL
PAINLEVEL_OUTOF10: 0 - NO PAIN

## 2023-12-20 ASSESSMENT — PAIN - FUNCTIONAL ASSESSMENT: PAIN_FUNCTIONAL_ASSESSMENT: 0-10

## 2023-12-20 NOTE — Clinical Note
Procedure end time. 16 Spanish suprapubic catheter placed after removal of suprapubic drain, intact. Site free from bleeding/hematoma, dressed with drain sponge and secured with balloon/bumper. Patient tolerated procedure well.

## 2023-12-20 NOTE — PRE-PROCEDURE NOTE
Interventional Radiology Preprocedure Note    Indication for procedure: The encounter diagnosis was Suprapubic catheter dysfunction, subsequent encounter. SPT exchange    Relevant review of systems: NA    Relevant Labs:   Lab Results   Component Value Date    CREATININE 1.00 11/21/2023    EGFR 89 11/21/2023    INR 1.0 12/20/2023    PROTIME 11.5 12/20/2023       Planned Sedation/Anesthesia: Moderate    Airway assessment: normal    Directed physical examination:    Awake and alert, talkative, sister with patient to consent  No acute distress  Regular rate, rhythm  Breathing non-labored      Mallampati: III (soft and hard palate and base of uvula visible)    ASA Score: ASA 3 - Patient with moderate systemic disease with functional limitations    Benefits, risks and alternatives of procedure and planned sedation have been discussed with the patient and/or their representative. All questions answered and they agree to proceed.

## 2023-12-27 ASSESSMENT — ENCOUNTER SYMPTOMS
FEVER: 0
NAUSEA: 0
DIARRHEA: 0
VOMITING: 0
DIFFICULTY URINATING: 0
CONSTIPATION: 0
FATIGUE: 0
SHORTNESS OF BREATH: 0
COUGH: 0
PALPITATIONS: 0
ABDOMINAL PAIN: 0
CHILLS: 0

## 2024-01-09 ENCOUNTER — NURSING HOME VISIT (OUTPATIENT)
Dept: POST ACUTE CARE | Facility: EXTERNAL LOCATION | Age: 54
End: 2024-01-09
Payer: MEDICARE

## 2024-01-09 VITALS
DIASTOLIC BLOOD PRESSURE: 80 MMHG | OXYGEN SATURATION: 96 % | TEMPERATURE: 98.2 F | BODY MASS INDEX: 33.35 KG/M2 | SYSTOLIC BLOOD PRESSURE: 142 MMHG | WEIGHT: 274 LBS | HEART RATE: 90 BPM | RESPIRATION RATE: 18 BRPM

## 2024-01-09 DIAGNOSIS — F03.B0 MODERATE DEMENTIA WITHOUT BEHAVIORAL DISTURBANCE, PSYCHOTIC DISTURBANCE, MOOD DISTURBANCE, OR ANXIETY, UNSPECIFIED DEMENTIA TYPE (MULTI): Primary | ICD-10-CM

## 2024-01-09 DIAGNOSIS — Z93.59 SUPRAPUBIC CATHETER (MULTI): ICD-10-CM

## 2024-01-09 DIAGNOSIS — F29 UNSP PSYCHOSIS NOT DUE TO A SUBSTANCE OR KNOWN PHYSIOL COND (MULTI): ICD-10-CM
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
## 2024-01-09 DIAGNOSIS — G40.909 SEIZURE DISORDER (MULTI): ICD-10-CM

## 2024-01-09 DIAGNOSIS — Q07.9 CONGENITAL ENCEPHALOPATHY (MULTI): ICD-10-CM

## 2024-01-09 PROCEDURE — 99309 SBSQ NF CARE MODERATE MDM 30: CPT | Performed by: NURSE PRACTITIONER

## 2024-01-09 ASSESSMENT — ENCOUNTER SYMPTOMS
DIFFICULTY URINATING: 0
ABDOMINAL PAIN: 0
PALPITATIONS: 0
DIARRHEA: 0
CONSTIPATION: 0
VOMITING: 0
SHORTNESS OF BREATH: 0
NAUSEA: 0
FEVER: 0
FATIGUE: 0
CHILLS: 0
COUGH: 0

## 2024-01-09 NOTE — LETTER
Patient: Nato Downs  : 1970    Encounter Date: 2024    Subjective  Nato Downs is a 53 y.o. male Here to follow up on fall.  HPI  He was in the dining room and went to sit down when the chair tipped and he fell, landed on his buttocks.  No apparent injury.  No chest pain, shortness of breath, syncope, lightheadedness or dizziness associated with the fall.  Grzegorz is sitting up in chair, states he is feeling well.  Denies any complaints of pain.    No concerns per staff.     Review of Systems   Constitutional:  Negative for chills, fatigue and fever.   Respiratory:  Negative for cough and shortness of breath.    Cardiovascular:  Negative for chest pain and palpitations.   Gastrointestinal:  Negative for abdominal pain, constipation, diarrhea, nausea and vomiting.   Genitourinary:  Negative for difficulty urinating.       Objective  /80   Pulse 90   Temp 36.8 °C (98.2 °F)   Resp 18   Wt 124 kg (274 lb)   SpO2 96%   BMI 33.35 kg/m²     Physical Exam  Constitutional:       General: He is not in acute distress.     Appearance: He is obese.   HENT:      Head: Normocephalic and atraumatic.   Eyes:      Conjunctiva/sclera: Conjunctivae normal.   Cardiovascular:      Rate and Rhythm: Normal rate and regular rhythm.   Pulmonary:      Effort: Pulmonary effort is normal. No respiratory distress.      Breath sounds: Normal breath sounds.   Abdominal:      General: Bowel sounds are normal. There is no distension.      Palpations: Abdomen is soft.      Tenderness: There is no abdominal tenderness.      Comments: Ostomy draining liquid brown   Genitourinary:     Comments: Suprapubic cath draining clear yellow  Musculoskeletal:         General: Normal range of motion.      Right lower leg: No edema.      Left lower leg: No edema.   Skin:     General: Skin is warm and dry.   Neurological:      General: No focal deficit present.      Mental Status: He is alert. Mental status is at baseline.    Psychiatric:         Mood and Affect: Mood normal.         Behavior: Behavior normal.         Assessment/Plan  Problem List Items Addressed This Visit       Congenital encephalopathy (CMS/HCC)    Seizure disorder (CMS/HCC)     no known recent seizures.  Staff to monitor and notify for any seizure activity.             Suprapubic catheter (CMS/HCC)     He had suprapubic cath site revision recently,   suprapubic cath functioning well.            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           Fall     He had a fall in the dining room where the chair tipped over and he landed on his buttocks, no injury.   No chest pain, shortness of breath, syncope, lightheadedness or dizziness associated with the fall.           Moderate dementia without behavioral disturbance, psychotic disturbance, mood disturbance, or anxiety, unspecified dementia type (CMS/HCC) - Primary     Mild to moderate.         labs/meds/orders reviewed  staff to monitor and notify for any changes.  continue with supportive and restorative care  next labs 4/24 and prn.       Electronically Signed By: CHARLA Larios-HANH   1/9/24 12:59 PM

## 2024-01-09 NOTE — ASSESSMENT & PLAN NOTE
He had a fall in the dining room where the chair tipped over and he landed on his buttocks, no injury.   No chest pain, shortness of breath, syncope, lightheadedness or dizziness associated with the fall.

## 2024-01-09 NOTE — PROGRESS NOTES
Subjective   Nato Downs is a 53 y.o. male Here to follow up on fall.  HPI  He was in the dining room and went to sit down when the chair tipped and he fell, landed on his buttocks.  No apparent injury.  No chest pain, shortness of breath, syncope, lightheadedness or dizziness associated with the fall.  Grzegorz is sitting up in chair, states he is feeling well.  Denies any complaints of pain.    No concerns per staff.     Review of Systems   Constitutional:  Negative for chills, fatigue and fever.   Respiratory:  Negative for cough and shortness of breath.    Cardiovascular:  Negative for chest pain and palpitations.   Gastrointestinal:  Negative for abdominal pain, constipation, diarrhea, nausea and vomiting.   Genitourinary:  Negative for difficulty urinating.       Objective   /80   Pulse 90   Temp 36.8 °C (98.2 °F)   Resp 18   Wt 124 kg (274 lb)   SpO2 96%   BMI 33.35 kg/m²     Physical Exam  Constitutional:       General: He is not in acute distress.     Appearance: He is obese.   HENT:      Head: Normocephalic and atraumatic.   Eyes:      Conjunctiva/sclera: Conjunctivae normal.   Cardiovascular:      Rate and Rhythm: Normal rate and regular rhythm.   Pulmonary:      Effort: Pulmonary effort is normal. No respiratory distress.      Breath sounds: Normal breath sounds.   Abdominal:      General: Bowel sounds are normal. There is no distension.      Palpations: Abdomen is soft.      Tenderness: There is no abdominal tenderness.      Comments: Ostomy draining liquid brown   Genitourinary:     Comments: Suprapubic cath draining clear yellow  Musculoskeletal:         General: Normal range of motion.      Right lower leg: No edema.      Left lower leg: No edema.   Skin:     General: Skin is warm and dry.   Neurological:      General: No focal deficit present.      Mental Status: He is alert. Mental status is at baseline.   Psychiatric:         Mood and Affect: Mood normal.         Behavior: Behavior  normal.         Assessment/Plan   Problem List Items Addressed This Visit       Congenital encephalopathy (CMS/HCC)    Seizure disorder (CMS/HCC)     no known recent seizures.  Staff to monitor and notify for any seizure activity.             Suprapubic catheter (CMS/HCC)     He had suprapubic cath site revision recently,   suprapubic cath functioning well.            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           Fall     He had a fall in the dining room where the chair tipped over and he landed on his buttocks, no injury.   No chest pain, shortness of breath, syncope, lightheadedness or dizziness associated with the fall.           Moderate dementia without behavioral disturbance, psychotic disturbance, mood disturbance, or anxiety, unspecified dementia type (CMS/HCC) - Primary     Mild to moderate.         labs/meds/orders reviewed  staff to monitor and notify for any changes.  continue with supportive and restorative care  next labs 4/24 and prn.

## 2024-01-16 ENCOUNTER — NURSING HOME VISIT (OUTPATIENT)
Dept: POST ACUTE CARE | Facility: EXTERNAL LOCATION | Age: 54
End: 2024-01-16
Payer: MEDICARE

## 2024-01-16 VITALS
RESPIRATION RATE: 18 BRPM | HEART RATE: 80 BPM | TEMPERATURE: 98 F | BODY MASS INDEX: 33.35 KG/M2 | WEIGHT: 274 LBS | DIASTOLIC BLOOD PRESSURE: 84 MMHG | SYSTOLIC BLOOD PRESSURE: 140 MMHG | OXYGEN SATURATION: 97 %

## 2024-01-16 DIAGNOSIS — E66.9 OBESITY, UNSPECIFIED CLASSIFICATION, UNSPECIFIED OBESITY TYPE, UNSPECIFIED WHETHER SERIOUS COMORBIDITY PRESENT: ICD-10-CM

## 2024-01-16 DIAGNOSIS — Z93.59 SUPRAPUBIC CATHETER (MULTI): Primary | ICD-10-CM

## 2024-01-16 DIAGNOSIS — F39 MOOD DISORDER (CMS-HCC): ICD-10-CM

## 2024-01-16 DIAGNOSIS — G40.909 SEIZURE DISORDER (MULTI): ICD-10-CM

## 2024-01-16 DIAGNOSIS — F03.B0 MODERATE DEMENTIA WITHOUT BEHAVIORAL DISTURBANCE, PSYCHOTIC DISTURBANCE, MOOD DISTURBANCE, OR ANXIETY, UNSPECIFIED DEMENTIA TYPE (MULTI): ICD-10-CM

## 2024-01-16 PROCEDURE — 99309 SBSQ NF CARE MODERATE MDM 30: CPT | Performed by: NURSE PRACTITIONER

## 2024-01-16 ASSESSMENT — ENCOUNTER SYMPTOMS
COUGH: 0
DIFFICULTY URINATING: 0
CONSTIPATION: 0
ABDOMINAL PAIN: 0
DIARRHEA: 0
FEVER: 0
VOMITING: 0
SHORTNESS OF BREATH: 0
PALPITATIONS: 0
CHILLS: 0
FATIGUE: 0
NAUSEA: 0

## 2024-01-16 NOTE — ASSESSMENT & PLAN NOTE
He is having intermittent leaking from previous suprapubic cath site, no sx of infeciton and nontoxic appearing.  Staff has contacted urology and they are monitoring area.    staff to monitor and notify for any changes.

## 2024-01-16 NOTE — PROGRESS NOTES
Subjective   Nato Downs is a 53 y.o. male Here for routine monthly visit.   HPI Multiple health problems have been reviewed.  The course has been unchanged.  The patient  is moderately confused.   There are no family members present during time of visit.    he  denies any complaints when asked.  Eating and drinking well.   He is having drainage from previous suprapubic cath site, urology has been contacted and will follow up if persists, no sx of infection.         Review of Systems   Constitutional:  Negative for chills, fatigue and fever.   Respiratory:  Negative for cough and shortness of breath.    Cardiovascular:  Negative for chest pain and palpitations.   Gastrointestinal:  Negative for abdominal pain, constipation, diarrhea, nausea and vomiting.   Genitourinary:  Negative for difficulty urinating.       Objective   /84   Pulse 80   Temp 36.7 °C (98 °F)   Resp 18   Wt 124 kg (274 lb)   SpO2 97%   BMI 33.35 kg/m²     Physical Exam  Constitutional:       General: He is not in acute distress.     Appearance: He is obese.   HENT:      Head: Normocephalic and atraumatic.   Eyes:      Conjunctiva/sclera: Conjunctivae normal.   Cardiovascular:      Rate and Rhythm: Normal rate and regular rhythm.   Pulmonary:      Effort: Pulmonary effort is normal. No respiratory distress.      Breath sounds: Normal breath sounds.   Abdominal:      General: Bowel sounds are normal. There is no distension.      Palpations: Abdomen is soft.      Tenderness: There is no abdominal tenderness.      Comments: Ostomy draining liquid brown   Genitourinary:     Comments: Suprapubic cath draining clear yellow, unable to examine previous suprapubic cath site as he is sitting up in common area, fully dressed.   Musculoskeletal:         General: Normal range of motion.      Right lower leg: No edema.      Left lower leg: No edema.   Skin:     General: Skin is warm and dry.   Neurological:      General: No focal deficit present.       Mental Status: He is alert. Mental status is at baseline.   Psychiatric:         Mood and Affect: Mood normal.         Behavior: Behavior normal.         Assessment/Plan   Problem List Items Addressed This Visit       Mood disorder (CMS/HCC)     No recurrence of behaviors.          Obesity     This will increase his risk of complications.          Seizure disorder (CMS/HCC)     no known recent seizures.  Staff to monitor and notify for any seizure activity.             Suprapubic catheter (CMS/HCC) - Primary     He is having intermittent leaking from previous suprapubic cath site, no sx of infeciton and nontoxic appearing.  Staff has contacted urology and they are monitoring area.    staff to monitor and notify for any changes.           Moderate dementia without behavioral disturbance, psychotic disturbance, mood disturbance, or anxiety, unspecified dementia type (CMS/HCC)     Mild to moderate.         labs/meds/orders reviewed  staff to monitor and notify for any changes.  continue with supportive and restorative care  next labs 4/24 and prn.

## 2024-01-16 NOTE — LETTER
Patient: Nato Downs  : 1970    Encounter Date: 2024    Subjective  Nato Downs is a 53 y.o. male Here for routine monthly visit.   HPI Multiple health problems have been reviewed.  The course has been unchanged.  The patient  is moderately confused.   There are no family members present during time of visit.    he  denies any complaints when asked.  Eating and drinking well.   He is having drainage from previous suprapubic cath site, urology has been contacted and will follow up if persists, no sx of infection.         Review of Systems   Constitutional:  Negative for chills, fatigue and fever.   Respiratory:  Negative for cough and shortness of breath.    Cardiovascular:  Negative for chest pain and palpitations.   Gastrointestinal:  Negative for abdominal pain, constipation, diarrhea, nausea and vomiting.   Genitourinary:  Negative for difficulty urinating.       Objective  /84   Pulse 80   Temp 36.7 °C (98 °F)   Resp 18   Wt 124 kg (274 lb)   SpO2 97%   BMI 33.35 kg/m²     Physical Exam  Constitutional:       General: He is not in acute distress.     Appearance: He is obese.   HENT:      Head: Normocephalic and atraumatic.   Eyes:      Conjunctiva/sclera: Conjunctivae normal.   Cardiovascular:      Rate and Rhythm: Normal rate and regular rhythm.   Pulmonary:      Effort: Pulmonary effort is normal. No respiratory distress.      Breath sounds: Normal breath sounds.   Abdominal:      General: Bowel sounds are normal. There is no distension.      Palpations: Abdomen is soft.      Tenderness: There is no abdominal tenderness.      Comments: Ostomy draining liquid brown   Genitourinary:     Comments: Suprapubic cath draining clear yellow, unable to examine previous suprapubic cath site as he is sitting up in common area, fully dressed.   Musculoskeletal:         General: Normal range of motion.      Right lower leg: No edema.      Left lower leg: No edema.   Skin:     General: Skin is  warm and dry.   Neurological:      General: No focal deficit present.      Mental Status: He is alert. Mental status is at baseline.   Psychiatric:         Mood and Affect: Mood normal.         Behavior: Behavior normal.         Assessment/Plan  Problem List Items Addressed This Visit       Mood disorder (CMS/HCC)     No recurrence of behaviors.          Obesity     This will increase his risk of complications.          Seizure disorder (CMS/HCC)     no known recent seizures.  Staff to monitor and notify for any seizure activity.             Suprapubic catheter (CMS/HCC) - Primary     He is having intermittent leaking from previous suprapubic cath site, no sx of infeciton and nontoxic appearing.  Staff has contacted urology and they are monitoring area.    staff to monitor and notify for any changes.           Moderate dementia without behavioral disturbance, psychotic disturbance, mood disturbance, or anxiety, unspecified dementia type (CMS/HCC)     Mild to moderate.         labs/meds/orders reviewed  staff to monitor and notify for any changes.  continue with supportive and restorative care  next labs 4/24 and prn.       Electronically Signed By: NASREEN Larios   1/16/24 12:13 PM

## 2024-01-18 ENCOUNTER — NURSING HOME VISIT (OUTPATIENT)
Dept: POST ACUTE CARE | Facility: EXTERNAL LOCATION | Age: 54
End: 2024-01-18
Payer: MEDICARE

## 2024-01-18 VITALS
TEMPERATURE: 98.1 F | DIASTOLIC BLOOD PRESSURE: 84 MMHG | WEIGHT: 275 LBS | SYSTOLIC BLOOD PRESSURE: 140 MMHG | OXYGEN SATURATION: 96 % | HEART RATE: 80 BPM | RESPIRATION RATE: 18 BRPM | BODY MASS INDEX: 33.47 KG/M2

## 2024-01-18 DIAGNOSIS — R53.81 PHYSICAL DEBILITY: ICD-10-CM

## 2024-01-18 DIAGNOSIS — F03.B0 MODERATE DEMENTIA WITHOUT BEHAVIORAL DISTURBANCE, PSYCHOTIC DISTURBANCE, MOOD DISTURBANCE, OR ANXIETY, UNSPECIFIED DEMENTIA TYPE (MULTI): ICD-10-CM

## 2024-01-18 DIAGNOSIS — G40.909 SEIZURE DISORDER (MULTI): Primary | ICD-10-CM

## 2024-01-18 DIAGNOSIS — F39 MOOD DISORDER (CMS-HCC): ICD-10-CM

## 2024-01-18 DIAGNOSIS — Q07.9 CONGENITAL ENCEPHALOPATHY (MULTI): ICD-10-CM

## 2024-01-18 DIAGNOSIS — N31.9 NEUROGENIC BLADDER: ICD-10-CM

## 2024-01-18 DIAGNOSIS — K59.2 NEUROGENIC BOWEL: ICD-10-CM

## 2024-01-18 PROCEDURE — 99308 SBSQ NF CARE LOW MDM 20: CPT | Performed by: INTERNAL MEDICINE

## 2024-01-18 NOTE — PROGRESS NOTES
Subjective   Patient ID: Nato Downs is a 53 y.o. male who is long term resident being seen and evaluated for multiple medical problems.    HPI   This 53-year-old male patient is resting comfortably in his bed in no distress.  He reports feeling short of breath when he is up walking around otherwise has no dyspnea.  Nursing postulates that this is due to his weight gain and deconditioning.  Nursing has no other adverse events reported to me at this time.    Current high risk medication:  Clonazepam      Diastat  Keppra  Risperidone  Trileptal    Laboratory examination from October 2023:  Potassium 4.1  Bicarbonate 28  Creatinine 1.1  Hemoglobin 12.5  Laboratory examination from July 2023:  Keppra and Trileptal levels were normal    Review of Systems   Constitutional:  Negative for chills, fatigue and fever.   Respiratory:  Negative for cough and shortness of breath.    Cardiovascular:  Negative for chest pain and palpitations.   Gastrointestinal:  Negative for abdominal pain, constipation, diarrhea, nausea and vomiting.   Genitourinary:  Negative for difficulty urinating.       Objective   /84   Pulse 80   Temp 36.7 °C (98.1 °F)   Resp 18   Wt 125 kg (275 lb)   SpO2 96%   BMI 33.47 kg/m²     Physical Exam  Constitutional:       General: He is not in acute distress.     Appearance: He is obese.   HENT:      Head: Normocephalic and atraumatic.   Eyes:      Conjunctiva/sclera: Conjunctivae normal.   Cardiovascular:      Rate and Rhythm: Normal rate and regular rhythm.   Pulmonary:      Effort: Pulmonary effort is normal. No respiratory distress.      Breath sounds: Normal breath sounds.   Abdominal:      General: Bowel sounds are normal. There is no distension.      Palpations: Abdomen is soft.      Tenderness: There is no abdominal tenderness.      Comments: Ostomy draining liquid brown   Genitourinary:     Comments: Suprapubic cath draining clear yellow, unable to examine previous suprapubic cath site  as he is sitting up in common area, fully dressed.   Musculoskeletal:         General: Normal range of motion.      Right lower leg: No edema.      Left lower leg: No edema.   Skin:     General: Skin is warm and dry.   Neurological:      General: No focal deficit present.      Mental Status: He is alert. Mental status is at baseline.   Psychiatric:         Mood and Affect: Mood normal.         Behavior: Behavior normal.         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  We will continue with restorative and supportive care as the patient tolerates    B.  Laboratory examinations will next be due in April 2024 or as needed    C.  The patient's prognosis is guarded.

## 2024-01-18 NOTE — LETTER
Patient: Nato Downs  : 1970    Encounter Date: 2024    Subjective  Patient ID: Nato Downs is a 53 y.o. male who is long term resident being seen and evaluated for multiple medical problems.    HPI   This 53-year-old male patient is resting comfortably in his bed in no distress.  He reports feeling short of breath when he is up walking around otherwise has no dyspnea.  Nursing postulates that this is due to his weight gain and deconditioning.  Nursing has no other adverse events reported to me at this time.    Current high risk medication:  Clonazepam      Diastat  Keppra  Risperidone  Trileptal    Laboratory examination from 2023:  Potassium 4.1  Bicarbonate 28  Creatinine 1.1  Hemoglobin 12.5  Laboratory examination from 2023:  Keppra and Trileptal levels were normal    Review of Systems   Constitutional:  Negative for chills, fatigue and fever.   Respiratory:  Negative for cough and shortness of breath.    Cardiovascular:  Negative for chest pain and palpitations.   Gastrointestinal:  Negative for abdominal pain, constipation, diarrhea, nausea and vomiting.   Genitourinary:  Negative for difficulty urinating.       Objective  /84   Pulse 80   Temp 36.7 °C (98.1 °F)   Resp 18   Wt 125 kg (275 lb)   SpO2 96%   BMI 33.47 kg/m²     Physical Exam  Constitutional:       General: He is not in acute distress.     Appearance: He is obese.   HENT:      Head: Normocephalic and atraumatic.   Eyes:      Conjunctiva/sclera: Conjunctivae normal.   Cardiovascular:      Rate and Rhythm: Normal rate and regular rhythm.   Pulmonary:      Effort: Pulmonary effort is normal. No respiratory distress.      Breath sounds: Normal breath sounds.   Abdominal:      General: Bowel sounds are normal. There is no distension.      Palpations: Abdomen is soft.      Tenderness: There is no abdominal tenderness.      Comments: Ostomy draining liquid brown   Genitourinary:     Comments: Suprapubic cath  draining clear yellow, unable to examine previous suprapubic cath site as he is sitting up in common area, fully dressed.   Musculoskeletal:         General: Normal range of motion.      Right lower leg: No edema.      Left lower leg: No edema.   Skin:     General: Skin is warm and dry.   Neurological:      General: No focal deficit present.      Mental Status: He is alert. Mental status is at baseline.   Psychiatric:         Mood and Affect: Mood normal.         Behavior: Behavior normal.         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  We will continue with restorative and supportive care as the patient tolerates    B.  Laboratory examinations will next be due in April 2024 or as needed    C.  The patient's prognosis is guarded.        Electronically Signed By: Amilcar Quezada MD   1/24/24  5:34 PM

## 2024-01-24 ASSESSMENT — ENCOUNTER SYMPTOMS
PALPITATIONS: 0
CONSTIPATION: 0
FEVER: 0
SHORTNESS OF BREATH: 0
VOMITING: 0
COUGH: 0
NAUSEA: 0
DIFFICULTY URINATING: 0
CHILLS: 0
DIARRHEA: 0
ABDOMINAL PAIN: 0
FATIGUE: 0

## 2024-02-23 ENCOUNTER — NURSING HOME VISIT (OUTPATIENT)
Dept: POST ACUTE CARE | Facility: EXTERNAL LOCATION | Age: 54
End: 2024-02-23
Payer: MEDICARE

## 2024-02-23 VITALS
OXYGEN SATURATION: 92 % | WEIGHT: 274 LBS | HEART RATE: 82 BPM | SYSTOLIC BLOOD PRESSURE: 122 MMHG | RESPIRATION RATE: 18 BRPM | DIASTOLIC BLOOD PRESSURE: 74 MMHG | BODY MASS INDEX: 33.35 KG/M2 | TEMPERATURE: 97.9 F

## 2024-02-23 DIAGNOSIS — Q07.9 CONGENITAL ENCEPHALOPATHY (MULTI): ICD-10-CM

## 2024-02-23 DIAGNOSIS — U07.1 COVID-19 VIRUS INFECTION: Primary | ICD-10-CM

## 2024-02-23 DIAGNOSIS — G40.909 SEIZURE DISORDER (MULTI): ICD-10-CM

## 2024-02-23 DIAGNOSIS — F03.B0 MODERATE DEMENTIA WITHOUT BEHAVIORAL DISTURBANCE, PSYCHOTIC DISTURBANCE, MOOD DISTURBANCE, OR ANXIETY, UNSPECIFIED DEMENTIA TYPE (MULTI): ICD-10-CM

## 2024-02-23 PROCEDURE — 99308 SBSQ NF CARE LOW MDM 20: CPT | Performed by: INTERNAL MEDICINE

## 2024-02-23 NOTE — PROGRESS NOTES
Subjective   Patient ID: Nato Downs is a 53 y.o. male who is long term resident being seen and evaluated for multiple medical problems.    HPI   This 53-year-old male patient is resting comfortably in his room in his recliner chair no distress.  He has recovered from COVID-19 virus infection and his clinical status much improved per nursing today.  He is smiling and has no complaints.  Nursing has no new other adverse events reported to me at this time.    Current high risk medication:  Klonopin  Diastat  Oxybutynin  Keppra  Risperidone  Trileptal    Laboratory examination from October 2023:  Potassium 4.4  Bicarbonate 28  Creatinine 1.1  Hemoglobin 12.5    Review of Systems   Constitutional:  Negative for chills, fatigue and fever.   Respiratory:  Negative for cough and shortness of breath.    Cardiovascular:  Negative for chest pain and palpitations.   Gastrointestinal:  Negative for abdominal pain, constipation, diarrhea, nausea and vomiting.   Genitourinary:  Negative for difficulty urinating.       Objective   /74   Pulse 82   Temp 36.6 °C (97.9 °F)   Resp 18   Wt 124 kg (274 lb)   SpO2 92%   BMI 33.35 kg/m²     Physical Exam  Constitutional:       General: He is not in acute distress.     Appearance: He is obese.   HENT:      Head: Normocephalic and atraumatic.   Eyes:      Conjunctiva/sclera: Conjunctivae normal.   Cardiovascular:      Rate and Rhythm: Normal rate and regular rhythm.   Pulmonary:      Effort: Pulmonary effort is normal. No respiratory distress.      Breath sounds: Normal breath sounds.   Abdominal:      General: Bowel sounds are normal. There is no distension.      Palpations: Abdomen is soft.      Tenderness: There is no abdominal tenderness.      Comments: Ostomy draining liquid brown   Genitourinary:     Comments: Suprapubic cath draining clear yellow, unable to examine previous suprapubic cath site as he is sitting up in common area, fully dressed.   Musculoskeletal:          General: Normal range of motion.      Right lower leg: No edema.      Left lower leg: No edema.   Skin:     General: Skin is warm and dry.   Neurological:      General: No focal deficit present.      Mental Status: He is alert. Mental status is at baseline.   Psychiatric:         Mood and Affect: Mood normal.         Behavior: Behavior normal.         Assessment/Plan   Problem List Items Addressed This Visit             ICD-10-CM    Congenital encephalopathy (CMS/HCC) Q07.9    Seizure disorder (CMS/HCC) G40.909    Moderate dementia without behavioral disturbance, psychotic disturbance, mood disturbance, or anxiety, unspecified dementia type (CMS/HCC) F03.B0    COVID-19 virus infection - Primary U07.1     A.  We will continue with restorative and supportive care as the patient tolerates    B.  The patient has recovered from COVID-19 virus and is therefore felt to be unlikely to benefit from Paxlovid therapy at this time    C.  Laboratory examinations next be due in April 2024 including seizure med levels    D.  The patient's prognosis is guarded.

## 2024-02-23 NOTE — LETTER
Patient: Nato Downs  : 1970    Encounter Date: 2024    Subjective  Patient ID: Nato Downs is a 53 y.o. male who is long term resident being seen and evaluated for multiple medical problems.    HPI   This 53-year-old male patient is resting comfortably in his room in his recliner chair no distress.  He has recovered from COVID-19 virus infection and his clinical status much improved per nursing today.  He is smiling and has no complaints.  Nursing has no new other adverse events reported to me at this time.    Current high risk medication:  Klonopin  Diastat  Oxybutynin  Keppra  Risperidone  Trileptal    Laboratory examination from 2023:  Potassium 4.4  Bicarbonate 28  Creatinine 1.1  Hemoglobin 12.5    Review of Systems   Constitutional:  Negative for chills, fatigue and fever.   Respiratory:  Negative for cough and shortness of breath.    Cardiovascular:  Negative for chest pain and palpitations.   Gastrointestinal:  Negative for abdominal pain, constipation, diarrhea, nausea and vomiting.   Genitourinary:  Negative for difficulty urinating.       Objective  /74   Pulse 82   Temp 36.6 °C (97.9 °F)   Resp 18   Wt 124 kg (274 lb)   SpO2 92%   BMI 33.35 kg/m²     Physical Exam  Constitutional:       General: He is not in acute distress.     Appearance: He is obese.   HENT:      Head: Normocephalic and atraumatic.   Eyes:      Conjunctiva/sclera: Conjunctivae normal.   Cardiovascular:      Rate and Rhythm: Normal rate and regular rhythm.   Pulmonary:      Effort: Pulmonary effort is normal. No respiratory distress.      Breath sounds: Normal breath sounds.   Abdominal:      General: Bowel sounds are normal. There is no distension.      Palpations: Abdomen is soft.      Tenderness: There is no abdominal tenderness.      Comments: Ostomy draining liquid brown   Genitourinary:     Comments: Suprapubic cath draining clear yellow, unable to examine previous suprapubic cath site as  he is sitting up in common area, fully dressed.   Musculoskeletal:         General: Normal range of motion.      Right lower leg: No edema.      Left lower leg: No edema.   Skin:     General: Skin is warm and dry.   Neurological:      General: No focal deficit present.      Mental Status: He is alert. Mental status is at baseline.   Psychiatric:         Mood and Affect: Mood normal.         Behavior: Behavior normal.         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  We will continue with restorative and supportive care as the patient tolerates    B.  The patient has recovered from COVID-19 virus and is therefore felt to be unlikely to benefit from Paxlovid therapy at this time    C.  Laboratory examinations next be due in April 2024 including seizure med levels    D.  The patient's prognosis is guarded.        Electronically Signed By: Amilcar Quezada MD   3/2/24  1:10 PM

## 2024-03-02 PROBLEM — U07.1 COVID-19 VIRUS INFECTION: Status: ACTIVE | Noted: 2024-03-02

## 2024-03-02 ASSESSMENT — ENCOUNTER SYMPTOMS
DIFFICULTY URINATING: 0
ABDOMINAL PAIN: 0
COUGH: 0
FEVER: 0
CONSTIPATION: 0
PALPITATIONS: 0
FATIGUE: 0
DIARRHEA: 0
NAUSEA: 0
CHILLS: 0
VOMITING: 0
SHORTNESS OF BREATH: 0

## 2024-03-15 ENCOUNTER — NURSING HOME VISIT (OUTPATIENT)
Dept: POST ACUTE CARE | Facility: EXTERNAL LOCATION | Age: 54
End: 2024-03-15
Payer: MEDICARE

## 2024-03-15 VITALS
BODY MASS INDEX: 33.35 KG/M2 | TEMPERATURE: 97.9 F | RESPIRATION RATE: 18 BRPM | DIASTOLIC BLOOD PRESSURE: 74 MMHG | HEART RATE: 88 BPM | SYSTOLIC BLOOD PRESSURE: 138 MMHG | WEIGHT: 274 LBS | OXYGEN SATURATION: 96 %

## 2024-03-15 DIAGNOSIS — N31.9 NEUROGENIC BLADDER: ICD-10-CM

## 2024-03-15 DIAGNOSIS — G40.909 SEIZURE DISORDER (MULTI): Primary | ICD-10-CM

## 2024-03-15 DIAGNOSIS — K59.2 NEUROGENIC BOWEL: ICD-10-CM

## 2024-03-15 DIAGNOSIS — Q07.9 CONGENITAL ENCEPHALOPATHY (MULTI): ICD-10-CM

## 2024-03-15 DIAGNOSIS — F03.B0 MODERATE DEMENTIA WITHOUT BEHAVIORAL DISTURBANCE, PSYCHOTIC DISTURBANCE, MOOD DISTURBANCE, OR ANXIETY, UNSPECIFIED DEMENTIA TYPE (MULTI): ICD-10-CM

## 2024-03-15 PROCEDURE — 99308 SBSQ NF CARE LOW MDM 20: CPT | Performed by: INTERNAL MEDICINE

## 2024-03-15 NOTE — PROGRESS NOTES
Subjective   Patient ID: Nato Downs is a 53 y.o. male who is long term resident being seen and evaluated for multiple medical problems.    HPI   53-year-old male patient is up in the [facility] area.  He denies any pain or shortness of breath me.  He is in good spirits and nursing has no new adverse events reported to me.    Current high risk medication:  Klonopin  Diastat  Ditropan  Keppra  Trileptal    Laboratory examination from October 2023:  BMP normal  Hemoglobin 12.5  Laboratory examination from July 2023:  Vitamin D, Keppra, Trileptal levels normal    Review of Systems   Constitutional:  Negative for chills, fatigue and fever.   Respiratory:  Negative for cough and shortness of breath.    Cardiovascular:  Negative for chest pain and palpitations.   Gastrointestinal:  Negative for abdominal pain, constipation, diarrhea, nausea and vomiting.   Genitourinary:  Negative for difficulty urinating.       Objective   /74   Pulse 88   Temp 36.6 °C (97.9 °F)   Resp 18   Wt 124 kg (274 lb)   SpO2 96%   BMI 33.35 kg/m²     Physical Exam  Constitutional:       General: He is not in acute distress.     Appearance: He is obese.   HENT:      Head: Normocephalic and atraumatic.   Eyes:      Conjunctiva/sclera: Conjunctivae normal.   Cardiovascular:      Rate and Rhythm: Normal rate and regular rhythm.   Pulmonary:      Effort: Pulmonary effort is normal. No respiratory distress.      Breath sounds: Normal breath sounds.   Abdominal:      General: Bowel sounds are normal. There is no distension.      Palpations: Abdomen is soft.      Tenderness: There is no abdominal tenderness.      Comments: Ostomy draining liquid brown   Genitourinary:     Comments: Suprapubic cath draining clear yellow, unable to examine previous suprapubic cath site as he is sitting up in common area, fully dressed.   Musculoskeletal:         General: Normal range of motion.      Right lower leg: No edema.      Left lower leg: No edema.    Skin:     General: Skin is warm and dry.   Neurological:      General: No focal deficit present.      Mental Status: He is alert. Mental status is at baseline.   Psychiatric:         Mood and Affect: Mood normal.         Behavior: Behavior normal.         Assessment/Plan   Problem List Items Addressed This Visit             ICD-10-CM    Neurogenic bladder N31.9    Neurogenic bowel K59.2    Congenital encephalopathy (CMS/HCC) Q07.9    Seizure disorder (CMS/HCC) - Primary G40.909    Moderate dementia without behavioral disturbance, psychotic disturbance, mood disturbance, or anxiety, unspecified dementia type (CMS/HCC) F03.B0       8.  We will continue with restorative and supportive care as the patient tolerates    B.  Laboratory examinations will next be due in April 2024 as needed including repeat seizure med levels    C.  The patient's prognosis is guarded.

## 2024-03-15 NOTE — LETTER
Patient: Nato Downs  : 1970    Encounter Date: 03/15/2024    Subjective  Patient ID: Nato Downs is a 53 y.o. male who is long term resident being seen and evaluated for multiple medical problems.    HPI   53-year-old male patient is up in the [facility] area.  He denies any pain or shortness of breath me.  He is in good spirits and nursing has no new adverse events reported to me.    Current high risk medication:  Klonopin  Diastat  Ditropan  Keppra  Trileptal    Laboratory examination from 2023:  BMP normal  Hemoglobin 12.5  Laboratory examination from 2023:  Vitamin D, Keppra, Trileptal levels normal    Review of Systems   Constitutional:  Negative for chills, fatigue and fever.   Respiratory:  Negative for cough and shortness of breath.    Cardiovascular:  Negative for chest pain and palpitations.   Gastrointestinal:  Negative for abdominal pain, constipation, diarrhea, nausea and vomiting.   Genitourinary:  Negative for difficulty urinating.       Objective  /74   Pulse 88   Temp 36.6 °C (97.9 °F)   Resp 18   Wt 124 kg (274 lb)   SpO2 96%   BMI 33.35 kg/m²     Physical Exam  Constitutional:       General: He is not in acute distress.     Appearance: He is obese.   HENT:      Head: Normocephalic and atraumatic.   Eyes:      Conjunctiva/sclera: Conjunctivae normal.   Cardiovascular:      Rate and Rhythm: Normal rate and regular rhythm.   Pulmonary:      Effort: Pulmonary effort is normal. No respiratory distress.      Breath sounds: Normal breath sounds.   Abdominal:      General: Bowel sounds are normal. There is no distension.      Palpations: Abdomen is soft.      Tenderness: There is no abdominal tenderness.      Comments: Ostomy draining liquid brown   Genitourinary:     Comments: Suprapubic cath draining clear yellow, unable to examine previous suprapubic cath site as he is sitting up in common area, fully dressed.   Musculoskeletal:         General: Normal range of  motion.      Right lower leg: No edema.      Left lower leg: No edema.   Skin:     General: Skin is warm and dry.   Neurological:      General: No focal deficit present.      Mental Status: He is alert. Mental status is at baseline.   Psychiatric:         Mood and Affect: Mood normal.         Behavior: Behavior normal.         Assessment/Plan  Problem List Items Addressed This Visit             ICD-10-CM    Neurogenic bladder N31.9    Neurogenic bowel K59.2    Congenital encephalopathy (CMS/HCC) Q07.9    Seizure disorder (CMS/HCC) - Primary G40.909    Moderate dementia without behavioral disturbance, psychotic disturbance, mood disturbance, or anxiety, unspecified dementia type (CMS/HCC) F03.B0       8.  We will continue with restorative and supportive care as the patient tolerates    B.  Laboratory examinations will next be due in April 2024 as needed including repeat seizure med levels    C.  The patient's prognosis is guarded.      Electronically Signed By: Amilcar Quezada MD   3/21/24  1:16 PM

## 2024-03-18 DIAGNOSIS — F41.9 ANXIETY: ICD-10-CM

## 2024-03-18 DIAGNOSIS — G40.909 SEIZURE DISORDER (MULTI): ICD-10-CM

## 2024-03-18 RX ORDER — CLONAZEPAM 0.5 MG/1
0.5 TABLET ORAL 2 TIMES DAILY
Qty: 60 TABLET | Refills: 5 | Status: SHIPPED | OUTPATIENT
Start: 2024-03-18

## 2024-03-18 RX ORDER — DIAZEPAM 10 MG/2G
10 GEL RECTAL AS NEEDED
Qty: 1 EACH | Refills: 5 | Status: SHIPPED | OUTPATIENT
Start: 2024-03-18

## 2024-03-19 ENCOUNTER — NURSING HOME VISIT (OUTPATIENT)
Dept: POST ACUTE CARE | Facility: EXTERNAL LOCATION | Age: 54
End: 2024-03-19
Payer: MEDICARE

## 2024-03-19 VITALS
TEMPERATURE: 98 F | HEART RATE: 88 BPM | SYSTOLIC BLOOD PRESSURE: 138 MMHG | DIASTOLIC BLOOD PRESSURE: 74 MMHG | OXYGEN SATURATION: 96 % | RESPIRATION RATE: 18 BRPM | WEIGHT: 274 LBS | BODY MASS INDEX: 33.35 KG/M2

## 2024-03-19 DIAGNOSIS — F03.B0 MODERATE DEMENTIA WITHOUT BEHAVIORAL DISTURBANCE, PSYCHOTIC DISTURBANCE, MOOD DISTURBANCE, OR ANXIETY, UNSPECIFIED DEMENTIA TYPE (MULTI): ICD-10-CM

## 2024-03-19 DIAGNOSIS — Z93.59 SUPRAPUBIC CATHETER (MULTI): ICD-10-CM

## 2024-03-19 DIAGNOSIS — G40.909 SEIZURE DISORDER (MULTI): Primary | ICD-10-CM

## 2024-03-19 DIAGNOSIS — Z93.2 ILEOSTOMY IN PLACE (MULTI): ICD-10-CM

## 2024-03-19 DIAGNOSIS — E55.9 VITAMIN D DEFICIENCY: ICD-10-CM

## 2024-03-19 DIAGNOSIS — E66.9 OBESITY, UNSPECIFIED CLASSIFICATION, UNSPECIFIED OBESITY TYPE, UNSPECIFIED WHETHER SERIOUS COMORBIDITY PRESENT: ICD-10-CM

## 2024-03-19 PROCEDURE — 99309 SBSQ NF CARE MODERATE MDM 30: CPT | Performed by: NURSE PRACTITIONER

## 2024-03-19 ASSESSMENT — ENCOUNTER SYMPTOMS
FATIGUE: 0
NAUSEA: 0
COUGH: 0
CHILLS: 0
CONSTIPATION: 0
ABDOMINAL PAIN: 0
FEVER: 0
VOMITING: 0
DIFFICULTY URINATING: 0
PALPITATIONS: 0
DIARRHEA: 0
SHORTNESS OF BREATH: 0

## 2024-03-19 NOTE — PROGRESS NOTES
Subjective   Nato Downs is a 53 y.o. male Here for routine monthly visit.   HPI Multiple health problems have been reviewed.  The course has been unchanged.  The patient  is moderately confused.   There are no family members present during time of visit.    he  denies any complaints when asked.  Eating and drinking well.  No known recent seizures.  Sitting up in common area.  No concerns per staff.       Review of Systems   Constitutional:  Negative for chills, fatigue and fever.   Respiratory:  Negative for cough and shortness of breath.    Cardiovascular:  Negative for chest pain and palpitations.   Gastrointestinal:  Negative for abdominal pain, constipation, diarrhea, nausea and vomiting.   Genitourinary:  Negative for difficulty urinating.       Objective   /74   Pulse 88   Temp 36.7 °C (98 °F)   Resp 18   Wt 124 kg (274 lb)   SpO2 96%   BMI 33.35 kg/m²     Physical Exam  Constitutional:       General: He is not in acute distress.     Appearance: He is obese.   HENT:      Head: Normocephalic and atraumatic.   Eyes:      Conjunctiva/sclera: Conjunctivae normal.   Cardiovascular:      Rate and Rhythm: Normal rate and regular rhythm.   Pulmonary:      Effort: Pulmonary effort is normal. No respiratory distress.      Breath sounds: Normal breath sounds.   Abdominal:      General: Bowel sounds are normal. There is no distension.      Palpations: Abdomen is soft.      Tenderness: There is no abdominal tenderness.      Comments: Ostomy draining liquid brown   Genitourinary:     Comments: Suprapubic cath draining clear yellow, unable to examine previous suprapubic cath site as he is sitting up in common area, fully dressed.   Musculoskeletal:         General: Normal range of motion.      Right lower leg: No edema.      Left lower leg: No edema.   Skin:     General: Skin is warm and dry.   Neurological:      General: No focal deficit present.      Mental Status: He is alert. Mental status is at  baseline.   Psychiatric:         Mood and Affect: Mood normal.         Behavior: Behavior normal.         Assessment/Plan   Problem List Items Addressed This Visit       Ileostomy in place (CMS/[facility])     Functioning well.           Obesity     This will increase his risk of complications.          Seizure disorder (CMS/HCC) - Primary     no known recent seizures.  Staff to monitor and notify for any seizure activity.             Suprapubic catheter (CMS/[facility])     Functioning well.     staff to monitor and notify for any changes.           Vitamin D deficiency     monitor with routine labs.           Moderate dementia without behavioral disturbance, psychotic disturbance, mood disturbance, or anxiety, unspecified dementia type (CMS/[facility])     Mild to moderate.         labs/meds/orders reviewed  staff to monitor and notify for any changes.  continue with supportive and restorative care  next labs 4/24 and prn.

## 2024-03-19 NOTE — LETTER
Patient: Nato Downs  : 1970    Encounter Date: 2024    Subjective  Nato Downs is a 53 y.o. male Here for routine monthly visit.   HPI Multiple health problems have been reviewed.  The course has been unchanged.  The patient  is moderately confused.   There are no family members present during time of visit.    he  denies any complaints when asked.  Eating and drinking well.  No known recent seizures.  Sitting up in common area.  No concerns per staff.       Review of Systems   Constitutional:  Negative for chills, fatigue and fever.   Respiratory:  Negative for cough and shortness of breath.    Cardiovascular:  Negative for chest pain and palpitations.   Gastrointestinal:  Negative for abdominal pain, constipation, diarrhea, nausea and vomiting.   Genitourinary:  Negative for difficulty urinating.       Objective  /74   Pulse 88   Temp 36.7 °C (98 °F)   Resp 18   Wt 124 kg (274 lb)   SpO2 96%   BMI 33.35 kg/m²     Physical Exam  Constitutional:       General: He is not in acute distress.     Appearance: He is obese.   HENT:      Head: Normocephalic and atraumatic.   Eyes:      Conjunctiva/sclera: Conjunctivae normal.   Cardiovascular:      Rate and Rhythm: Normal rate and regular rhythm.   Pulmonary:      Effort: Pulmonary effort is normal. No respiratory distress.      Breath sounds: Normal breath sounds.   Abdominal:      General: Bowel sounds are normal. There is no distension.      Palpations: Abdomen is soft.      Tenderness: There is no abdominal tenderness.      Comments: Ostomy draining liquid brown   Genitourinary:     Comments: Suprapubic cath draining clear yellow, unable to examine previous suprapubic cath site as he is sitting up in common area, fully dressed.   Musculoskeletal:         General: Normal range of motion.      Right lower leg: No edema.      Left lower leg: No edema.   Skin:     General: Skin is warm and dry.   Neurological:      General: No focal deficit  present.      Mental Status: He is alert. Mental status is at baseline.   Psychiatric:         Mood and Affect: Mood normal.         Behavior: Behavior normal.         Assessment/Plan  Problem List Items Addressed This Visit       Ileostomy in place (CMS/HCC)     Functioning well.           Obesity     This will increase his risk of complications.          Seizure disorder (CMS/HCC) - Primary     no known recent seizures.  Staff to monitor and notify for any seizure activity.             Suprapubic catheter (CMS/HCC)     Functioning well.     staff to monitor and notify for any changes.           Vitamin D deficiency     monitor with routine labs.           Moderate dementia without behavioral disturbance, psychotic disturbance, mood disturbance, or anxiety, unspecified dementia type (CMS/HCC)     Mild to moderate.         labs/meds/orders reviewed  staff to monitor and notify for any changes.  continue with supportive and restorative care  next labs 4/24 and prn.       Electronically Signed By: NASREEN Larios   3/19/24  9:50 AM

## 2024-03-21 ASSESSMENT — ENCOUNTER SYMPTOMS
DIFFICULTY URINATING: 0
ABDOMINAL PAIN: 0
SHORTNESS OF BREATH: 0
NAUSEA: 0
PALPITATIONS: 0
FATIGUE: 0
COUGH: 0
CONSTIPATION: 0
CHILLS: 0
DIARRHEA: 0
VOMITING: 0
FEVER: 0

## 2024-04-19 ENCOUNTER — NURSING HOME VISIT (OUTPATIENT)
Dept: POST ACUTE CARE | Facility: EXTERNAL LOCATION | Age: 54
End: 2024-04-19
Payer: MEDICARE

## 2024-04-19 VITALS
OXYGEN SATURATION: 96 % | DIASTOLIC BLOOD PRESSURE: 77 MMHG | SYSTOLIC BLOOD PRESSURE: 131 MMHG | TEMPERATURE: 97.9 F | RESPIRATION RATE: 18 BRPM | BODY MASS INDEX: 34.2 KG/M2 | WEIGHT: 281 LBS | HEART RATE: 86 BPM

## 2024-04-19 DIAGNOSIS — R53.81 PHYSICAL DEBILITY: ICD-10-CM

## 2024-04-19 DIAGNOSIS — N31.9 NEUROGENIC BLADDER: ICD-10-CM

## 2024-04-19 DIAGNOSIS — Z93.59 SUPRAPUBIC CATHETER (MULTI): Primary | ICD-10-CM

## 2024-04-19 DIAGNOSIS — Z93.2 ILEOSTOMY IN PLACE (MULTI): ICD-10-CM

## 2024-04-19 DIAGNOSIS — F03.B0 MODERATE DEMENTIA WITHOUT BEHAVIORAL DISTURBANCE, PSYCHOTIC DISTURBANCE, MOOD DISTURBANCE, OR ANXIETY, UNSPECIFIED DEMENTIA TYPE (MULTI): ICD-10-CM

## 2024-04-19 DIAGNOSIS — F79 ACQUIRED INTELLECTUAL DISABILITY: ICD-10-CM

## 2024-04-19 DIAGNOSIS — K59.2 NEUROGENIC BOWEL: ICD-10-CM

## 2024-04-19 PROCEDURE — 99309 SBSQ NF CARE MODERATE MDM 30: CPT | Performed by: INTERNAL MEDICINE

## 2024-04-19 NOTE — LETTER
Patient: Nato Downs  : 1970    Encounter Date: 2024    Subjective  Patient ID: Nato Downs is a 53 y.o. male who is long term resident being seen and evaluated for multiple medical problems.    HPI   This 53-year-old male patient is sitting up in the common area watching television in no distress at all.  He has no complaints of pain or shortness of breath.  He is in good spirits today.  Nursing has no new adverse events reported to me.    Current high risk medication:  Ativan  Valium gel  Keppra  Risperdal  Trileptal    Laboratory examination from 2023 have been reviewed in detail by me.      Review of Systems   Constitutional:  Negative for chills, fatigue and fever.   Respiratory:  Negative for cough and shortness of breath.    Cardiovascular:  Negative for chest pain and palpitations.   Gastrointestinal:  Negative for abdominal pain, constipation, diarrhea, nausea and vomiting.   Genitourinary:  Negative for difficulty urinating.       Objective  /77   Pulse 86   Temp 36.6 °C (97.9 °F)   Resp 18   Wt 127 kg (281 lb)   SpO2 96%   BMI 34.20 kg/m²     Physical Exam  Constitutional:       General: He is not in acute distress.     Appearance: He is obese.   HENT:      Head: Normocephalic and atraumatic.   Eyes:      Conjunctiva/sclera: Conjunctivae normal.   Cardiovascular:      Rate and Rhythm: Normal rate and regular rhythm.   Pulmonary:      Effort: Pulmonary effort is normal. No respiratory distress.      Breath sounds: Normal breath sounds.   Abdominal:      General: Bowel sounds are normal. There is no distension.      Palpations: Abdomen is soft.      Tenderness: There is no abdominal tenderness.      Comments: Ostomy draining liquid brown   Genitourinary:     Comments: Suprapubic cath draining clear yellow, unable to examine previous suprapubic cath site as he is sitting up in common area, fully dressed.   Musculoskeletal:         General: Normal range of motion.       Right lower leg: No edema.      Left lower leg: No edema.   Skin:     General: Skin is warm and dry.   Neurological:      General: No focal deficit present.      Mental Status: He is alert. Mental status is at baseline.   Psychiatric:         Mood and Affect: Mood normal.         Behavior: Behavior normal.         Assessment/Plan  Problem List Items Addressed This Visit             ICD-10-CM    Acquired intellectual disability F79    Ileostomy in place (Multi) Z93.2    Neurogenic bladder N31.9    Neurogenic bowel K59.2    Physical debility R53.81    Suprapubic catheter (Multi) - Primary Z93.59    Moderate dementia without behavioral disturbance, psychotic disturbance, mood disturbance, or anxiety, unspecified dementia type (Multi) F03.B0     8.  We will continue with restorative and supportive care as the patient tolerates    B.  Laboratory examination been ordered for today to monitor his medications and other health issues    C.  The patient's prognosis is guarded.        Electronically Signed By: Amilcar Quezada MD   4/30/24 10:26 AM

## 2024-04-19 NOTE — PROGRESS NOTES
Subjective   Patient ID: Nato Downs is a 53 y.o. male who is long term resident being seen and evaluated for multiple medical problems.    HPI   This 53-year-old male patient is sitting up in the common area watching television in no distress at all.  He has no complaints of pain or shortness of breath.  He is in good spirits today.  Nursing has no new adverse events reported to me.    Current high risk medication:  Ativan  Valium gel  Keppra  Risperdal  Trileptal    Laboratory examination from October 2023 have been reviewed in detail by me.      Review of Systems   Constitutional:  Negative for chills, fatigue and fever.   Respiratory:  Negative for cough and shortness of breath.    Cardiovascular:  Negative for chest pain and palpitations.   Gastrointestinal:  Negative for abdominal pain, constipation, diarrhea, nausea and vomiting.   Genitourinary:  Negative for difficulty urinating.       Objective   /77   Pulse 86   Temp 36.6 °C (97.9 °F)   Resp 18   Wt 127 kg (281 lb)   SpO2 96%   BMI 34.20 kg/m²     Physical Exam  Constitutional:       General: He is not in acute distress.     Appearance: He is obese.   HENT:      Head: Normocephalic and atraumatic.   Eyes:      Conjunctiva/sclera: Conjunctivae normal.   Cardiovascular:      Rate and Rhythm: Normal rate and regular rhythm.   Pulmonary:      Effort: Pulmonary effort is normal. No respiratory distress.      Breath sounds: Normal breath sounds.   Abdominal:      General: Bowel sounds are normal. There is no distension.      Palpations: Abdomen is soft.      Tenderness: There is no abdominal tenderness.      Comments: Ostomy draining liquid brown   Genitourinary:     Comments: Suprapubic cath draining clear yellow, unable to examine previous suprapubic cath site as he is sitting up in common area, fully dressed.   Musculoskeletal:         General: Normal range of motion.      Right lower leg: No edema.      Left lower leg: No edema.   Skin:      General: Skin is warm and dry.   Neurological:      General: No focal deficit present.      Mental Status: He is alert. Mental status is at baseline.   Psychiatric:         Mood and Affect: Mood normal.         Behavior: Behavior normal.         Assessment/Plan   Problem List Items Addressed This Visit             ICD-10-CM    Acquired intellectual disability F79    Ileostomy in place (Multi) Z93.2    Neurogenic bladder N31.9    Neurogenic bowel K59.2    Physical debility R53.81    Suprapubic catheter (Multi) - Primary Z93.59    Moderate dementia without behavioral disturbance, psychotic disturbance, mood disturbance, or anxiety, unspecified dementia type (Multi) F03.B0     8.  We will continue with restorative and supportive care as the patient tolerates    B.  Laboratory examination been ordered for today to monitor his medications and other health issues    C.  The patient's prognosis is guarded.

## 2024-04-23 ENCOUNTER — NURSING HOME VISIT (OUTPATIENT)
Dept: POST ACUTE CARE | Facility: EXTERNAL LOCATION | Age: 54
End: 2024-04-23
Payer: MEDICARE

## 2024-04-23 VITALS
BODY MASS INDEX: 34.45 KG/M2 | WEIGHT: 283 LBS | OXYGEN SATURATION: 96 % | SYSTOLIC BLOOD PRESSURE: 131 MMHG | TEMPERATURE: 97.9 F | HEART RATE: 86 BPM | RESPIRATION RATE: 18 BRPM | DIASTOLIC BLOOD PRESSURE: 77 MMHG

## 2024-04-23 DIAGNOSIS — Z93.59 SUPRAPUBIC CATHETER (MULTI): ICD-10-CM

## 2024-04-23 DIAGNOSIS — G40.909 SEIZURE DISORDER (MULTI): ICD-10-CM

## 2024-04-23 DIAGNOSIS — L03.311 CELLULITIS OF ABDOMINAL WALL: Primary | ICD-10-CM

## 2024-04-23 DIAGNOSIS — F79 ACQUIRED INTELLECTUAL DISABILITY: ICD-10-CM

## 2024-04-23 PROBLEM — L03.90 CELLULITIS: Status: ACTIVE | Noted: 2024-04-23

## 2024-04-23 PROCEDURE — 99309 SBSQ NF CARE MODERATE MDM 30: CPT | Performed by: NURSE PRACTITIONER

## 2024-04-23 ASSESSMENT — ENCOUNTER SYMPTOMS
NAUSEA: 0
DIFFICULTY URINATING: 0
FATIGUE: 0
FEVER: 0
CONSTIPATION: 0
VOMITING: 0
SHORTNESS OF BREATH: 0
DIARRHEA: 0
ABDOMINAL PAIN: 0
COUGH: 0
PALPITATIONS: 0
CHILLS: 0

## 2024-04-23 NOTE — ASSESSMENT & PLAN NOTE
He had an episode yesterday in the dining room where he was staring blankly and slightly shaking per staff, lasted approximately 1 minute and resolved on its own.    WBC also slightly elevated on labs yesterday and starting treatment for cellulitis which may have lowered seizure threshold.  Keppra level therapeutic on labs.    Staff to monitor and notify for any recurrence of seizure activity

## 2024-04-23 NOTE — PROGRESS NOTES
Subjective   Nato Downs is a 53 y.o. male Here to follow up on likely seizure and labs,  HPI  He was noted to have an episode in the dining room last evening where he was staring blankly and slightly shaking and was not responding to staff.  Episode lasted 1 minute and then resolved without intervention, vss and he ambulated back to his room per his usual.  He was noted on routine labs to have mild leukocytosis and today noted ot have mild erythema around ostomy and just superior to ostomy.  Grzegorz states he is feeling fine, no pain when asked.  Eating and drinking well.  No other concerns per staff.         Review of Systems   Constitutional:  Negative for chills, fatigue and fever.   Respiratory:  Negative for cough and shortness of breath.    Cardiovascular:  Negative for chest pain and palpitations.   Gastrointestinal:  Negative for abdominal pain, constipation, diarrhea, nausea and vomiting.   Genitourinary:  Negative for difficulty urinating.       Objective   /77   Pulse 86   Temp 36.6 °C (97.9 °F)   Resp 18   Wt 128 kg (283 lb)   SpO2 96%   BMI 34.45 kg/m²     Physical Exam  Constitutional:       General: He is not in acute distress.     Appearance: He is obese.   HENT:      Head: Normocephalic and atraumatic.   Eyes:      Conjunctiva/sclera: Conjunctivae normal.   Cardiovascular:      Rate and Rhythm: Normal rate and regular rhythm.   Pulmonary:      Effort: Pulmonary effort is normal. No respiratory distress.      Breath sounds: Normal breath sounds.   Abdominal:      General: Bowel sounds are normal. There is no distension.      Palpations: Abdomen is soft.      Tenderness: There is no abdominal tenderness.      Comments: Ostomy draining liquid brown.    Erythema lateral and superior to ostomy site, mild warmth, no drainage.    Genitourinary:     Comments: Suprapubic cath draining clear yellow.  Musculoskeletal:         General: Normal range of motion.      Right lower leg: No edema.       Left lower leg: No edema.   Skin:     General: Skin is warm and dry.   Neurological:      General: No focal deficit present.      Mental Status: He is alert. Mental status is at baseline.   Psychiatric:         Mood and Affect: Mood normal.         Behavior: Behavior normal.         Assessment/Plan   Problem List Items Addressed This Visit       Acquired intellectual disability     Unable to live independently         Seizure disorder (Multi)     He had an episode yesterday in the dining room where he was staring blankly and slightly shaking per staff, lasted approximately 1 minute and resolved on its own.    WBC also slightly elevated on labs yesterday and starting treatment for cellulitis which may have lowered seizure threshold.  Keppra level therapeutic on labs.    Staff to monitor and notify for any recurrence of seizure activity           Suprapubic catheter (Multi)     Functioning well.     staff to monitor and notify for any changes.           Cellulitis - Primary     He has erythema noted laterally to ostomy site and just superior to the ostomy site, area slightly warm.  No pain when asked or palpated.  He has a hx of cellulitis to this area.  Also has mild leukocytosis and likely seizure yesterday afternoon.  Will start bactrim and staff to monitor area and notify for any worsening.         labs/meds/orders reviewed  staff to monitor and notify for any changes.  continue with supportive and restorative care  Start bactrim and staff to monitor for any worsening of erythema.   Staff to also monitor for any recurrence of seizure like activity.  Repeat CBC next week to monitor leukocytosis.

## 2024-04-23 NOTE — ASSESSMENT & PLAN NOTE
He has erythema noted laterally to ostomy site and just superior to the ostomy site, area slightly warm.  No pain when asked or palpated.  He has a hx of cellulitis to this area.  Also has mild leukocytosis and likely seizure yesterday afternoon.  Will start bactrim and staff to monitor area and notify for any worsening.

## 2024-04-23 NOTE — LETTER
Patient: Nato Downs  : 1970    Encounter Date: 2024    Subjective  Nato Downs is a 53 y.o. male Here to follow up on likely seizure and labs,  HPI  He was noted to have an episode in the dining room last evening where he was staring blankly and slightly shaking and was not responding to staff.  Episode lasted 1 minute and then resolved without intervention, vss and he ambulated back to his room per his usual.  He was noted on routine labs to have mild leukocytosis and today noted ot have mild erythema around ostomy and just superior to ostomy.  Grzegorz states he is feeling fine, no pain when asked.  Eating and drinking well.  No other concerns per staff.         Review of Systems   Constitutional:  Negative for chills, fatigue and fever.   Respiratory:  Negative for cough and shortness of breath.    Cardiovascular:  Negative for chest pain and palpitations.   Gastrointestinal:  Negative for abdominal pain, constipation, diarrhea, nausea and vomiting.   Genitourinary:  Negative for difficulty urinating.       Objective  /77   Pulse 86   Temp 36.6 °C (97.9 °F)   Resp 18   Wt 128 kg (283 lb)   SpO2 96%   BMI 34.45 kg/m²     Physical Exam  Constitutional:       General: He is not in acute distress.     Appearance: He is obese.   HENT:      Head: Normocephalic and atraumatic.   Eyes:      Conjunctiva/sclera: Conjunctivae normal.   Cardiovascular:      Rate and Rhythm: Normal rate and regular rhythm.   Pulmonary:      Effort: Pulmonary effort is normal. No respiratory distress.      Breath sounds: Normal breath sounds.   Abdominal:      General: Bowel sounds are normal. There is no distension.      Palpations: Abdomen is soft.      Tenderness: There is no abdominal tenderness.      Comments: Ostomy draining liquid brown.    Erythema lateral and superior to ostomy site, mild warmth, no drainage.    Genitourinary:     Comments: Suprapubic cath draining clear yellow.  Musculoskeletal:          General: Normal range of motion.      Right lower leg: No edema.      Left lower leg: No edema.   Skin:     General: Skin is warm and dry.   Neurological:      General: No focal deficit present.      Mental Status: He is alert. Mental status is at baseline.   Psychiatric:         Mood and Affect: Mood normal.         Behavior: Behavior normal.         Assessment/Plan  Problem List Items Addressed This Visit       Acquired intellectual disability     Unable to live independently         Seizure disorder (Multi)     He had an episode yesterday in the dining room where he was staring blankly and slightly shaking per staff, lasted approximately 1 minute and resolved on its own.    WBC also slightly elevated on labs yesterday and starting treatment for cellulitis which may have lowered seizure threshold.  Keppra level therapeutic on labs.    Staff to monitor and notify for any recurrence of seizure activity           Suprapubic catheter (Multi)     Functioning well.     staff to monitor and notify for any changes.           Cellulitis - Primary     He has erythema noted laterally to ostomy site and just superior to the ostomy site, area slightly warm.  No pain when asked or palpated.  He has a hx of cellulitis to this area.  Also has mild leukocytosis and likely seizure yesterday afternoon.  Will start bactrim and staff to monitor area and notify for any worsening.         labs/meds/orders reviewed  staff to monitor and notify for any changes.  continue with supportive and restorative care  Start bactrim and staff to monitor for any worsening of erythema.   Staff to also monitor for any recurrence of seizure like activity.  Repeat CBC next week to monitor leukocytosis.        Electronically Signed By: NASREEN Larios   4/23/24 12:52 PM

## 2024-04-30 ENCOUNTER — NURSING HOME VISIT (OUTPATIENT)
Dept: POST ACUTE CARE | Facility: EXTERNAL LOCATION | Age: 54
End: 2024-04-30
Payer: MEDICARE

## 2024-04-30 VITALS
BODY MASS INDEX: 34.45 KG/M2 | TEMPERATURE: 97.8 F | WEIGHT: 283 LBS | SYSTOLIC BLOOD PRESSURE: 120 MMHG | DIASTOLIC BLOOD PRESSURE: 80 MMHG | HEART RATE: 86 BPM | OXYGEN SATURATION: 96 % | RESPIRATION RATE: 18 BRPM

## 2024-04-30 DIAGNOSIS — Z93.2 ILEOSTOMY IN PLACE (MULTI): ICD-10-CM

## 2024-04-30 DIAGNOSIS — G40.909 SEIZURE DISORDER (MULTI): ICD-10-CM

## 2024-04-30 DIAGNOSIS — Z93.59 SUPRAPUBIC CATHETER (MULTI): ICD-10-CM

## 2024-04-30 DIAGNOSIS — L03.311 CELLULITIS OF ABDOMINAL WALL: Primary | ICD-10-CM

## 2024-04-30 PROCEDURE — 99309 SBSQ NF CARE MODERATE MDM 30: CPT | Performed by: NURSE PRACTITIONER

## 2024-04-30 ASSESSMENT — ENCOUNTER SYMPTOMS
DIFFICULTY URINATING: 0
DIARRHEA: 0
COUGH: 0
FATIGUE: 0
PALPITATIONS: 0
FATIGUE: 0
CHILLS: 0
ABDOMINAL PAIN: 0
SHORTNESS OF BREATH: 0
DIARRHEA: 0
VOMITING: 0
NAUSEA: 0
NAUSEA: 0
ABDOMINAL PAIN: 0
DIFFICULTY URINATING: 0
CONSTIPATION: 0
SHORTNESS OF BREATH: 0
COUGH: 0
FEVER: 0
PALPITATIONS: 0
VOMITING: 0
CONSTIPATION: 0
FEVER: 0
CHILLS: 0

## 2024-04-30 NOTE — PROGRESS NOTES
Subjective   Nato Downs is a 53 y.o. male Here for routine monthly visit.   HPI Multiple health problems have been reviewed.  The course has been unchanged.  The patient  is moderately confused.   There are no family members present during time of visit.    he  denies any complaints when asked.  Eating and drinking well.  He had a likely seizure last week in the setting of cellultiis, he was started on bactrim and sx resolved.   No seizure activity since episode last week.   Sitting up in common area.  No concerns per staff.       Review of Systems   Constitutional:  Negative for chills, fatigue and fever.   Respiratory:  Negative for cough and shortness of breath.    Cardiovascular:  Negative for chest pain and palpitations.   Gastrointestinal:  Negative for abdominal pain, constipation, diarrhea, nausea and vomiting.   Genitourinary:  Negative for difficulty urinating.       Objective   /80   Pulse 86   Temp 36.6 °C (97.8 °F)   Resp 18   Wt 128 kg (283 lb)   SpO2 96%   BMI 34.45 kg/m²     Physical Exam  Constitutional:       General: He is not in acute distress.     Appearance: He is obese.   HENT:      Head: Normocephalic and atraumatic.   Eyes:      Conjunctiva/sclera: Conjunctivae normal.   Cardiovascular:      Rate and Rhythm: Normal rate and regular rhythm.   Pulmonary:      Effort: Pulmonary effort is normal. No respiratory distress.      Breath sounds: Normal breath sounds.   Abdominal:      General: Bowel sounds are normal. There is no distension.      Palpations: Abdomen is soft.      Tenderness: There is no abdominal tenderness.      Comments: Ostomy draining liquid brown, no erythema noted around site.    Genitourinary:     Comments: Suprapubic cath draining clear yellow  Musculoskeletal:         General: Normal range of motion.      Right lower leg: No edema.      Left lower leg: No edema.   Skin:     General: Skin is warm and dry.   Neurological:      General: No focal deficit  present.      Mental Status: He is alert. Mental status is at baseline.   Psychiatric:         Mood and Affect: Mood normal.         Behavior: Behavior normal.         Assessment/Plan   Problem List Items Addressed This Visit       Ileostomy in place (Multi)     Functioning well.           Seizure disorder (Multi)        Staff to monitor and notify for any recurrence of seizure activity           Suprapubic catheter (Multi)     Functioning well.     staff to monitor and notify for any changes.           Cellulitis - Primary     He is completing course of bactrim and sx resolved.  Staff to monitor and notify for any recurrence of symptoms.          labs/meds/orders reviewed  staff to monitor and notify for any changes.  continue with supportive and restorative care  next labs 10/24 and prn.

## 2024-04-30 NOTE — ASSESSMENT & PLAN NOTE
He is completing course of bactrim and sx resolved.  Staff to monitor and notify for any recurrence of symptoms.

## 2024-04-30 NOTE — LETTER
Patient: Nato Downs  : 1970    Encounter Date: 2024    Subjective  Nato Downs is a 53 y.o. male Here for routine monthly visit.   HPI Multiple health problems have been reviewed.  The course has been unchanged.  The patient  is moderately confused.   There are no family members present during time of visit.    he  denies any complaints when asked.  Eating and drinking well.  He had a likely seizure last week in the setting of cellultiis, he was started on bactrim and sx resolved.   No seizure activity since episode last week.   Sitting up in common area.  No concerns per staff.       Review of Systems   Constitutional:  Negative for chills, fatigue and fever.   Respiratory:  Negative for cough and shortness of breath.    Cardiovascular:  Negative for chest pain and palpitations.   Gastrointestinal:  Negative for abdominal pain, constipation, diarrhea, nausea and vomiting.   Genitourinary:  Negative for difficulty urinating.       Objective  /80   Pulse 86   Temp 36.6 °C (97.8 °F)   Resp 18   Wt 128 kg (283 lb)   SpO2 96%   BMI 34.45 kg/m²     Physical Exam  Constitutional:       General: He is not in acute distress.     Appearance: He is obese.   HENT:      Head: Normocephalic and atraumatic.   Eyes:      Conjunctiva/sclera: Conjunctivae normal.   Cardiovascular:      Rate and Rhythm: Normal rate and regular rhythm.   Pulmonary:      Effort: Pulmonary effort is normal. No respiratory distress.      Breath sounds: Normal breath sounds.   Abdominal:      General: Bowel sounds are normal. There is no distension.      Palpations: Abdomen is soft.      Tenderness: There is no abdominal tenderness.      Comments: Ostomy draining liquid brown, no erythema noted around site.    Genitourinary:     Comments: Suprapubic cath draining clear yellow  Musculoskeletal:         General: Normal range of motion.      Right lower leg: No edema.      Left lower leg: No edema.   Skin:     General: Skin  is warm and dry.   Neurological:      General: No focal deficit present.      Mental Status: He is alert. Mental status is at baseline.   Psychiatric:         Mood and Affect: Mood normal.         Behavior: Behavior normal.         Assessment/Plan  Problem List Items Addressed This Visit       Ileostomy in place (Multi)     Functioning well.           Seizure disorder (Multi)        Staff to monitor and notify for any recurrence of seizure activity           Suprapubic catheter (Multi)     Functioning well.     staff to monitor and notify for any changes.           Cellulitis - Primary     He is completing course of bactrim and sx resolved.  Staff to monitor and notify for any recurrence of symptoms.          labs/meds/orders reviewed  staff to monitor and notify for any changes.  continue with supportive and restorative care  next labs 10/24 and prn.       Electronically Signed By: NASREEN Larios   4/30/24 12:35 PM

## 2024-05-16 ENCOUNTER — NURSING HOME VISIT (OUTPATIENT)
Dept: POST ACUTE CARE | Facility: EXTERNAL LOCATION | Age: 54
End: 2024-05-16
Payer: MEDICARE

## 2024-05-16 DIAGNOSIS — F03.B0 MODERATE DEMENTIA WITHOUT BEHAVIORAL DISTURBANCE, PSYCHOTIC DISTURBANCE, MOOD DISTURBANCE, OR ANXIETY, UNSPECIFIED DEMENTIA TYPE (MULTI): ICD-10-CM

## 2024-05-16 DIAGNOSIS — G40.909 SEIZURE DISORDER (MULTI): Primary | ICD-10-CM

## 2024-05-16 DIAGNOSIS — Z93.2 ILEOSTOMY IN PLACE (MULTI): ICD-10-CM

## 2024-05-16 DIAGNOSIS — E66.9 OBESITY, UNSPECIFIED CLASSIFICATION, UNSPECIFIED OBESITY TYPE, UNSPECIFIED WHETHER SERIOUS COMORBIDITY PRESENT: ICD-10-CM

## 2024-05-16 DIAGNOSIS — Z93.59 SUPRAPUBIC CATHETER (MULTI): ICD-10-CM

## 2024-05-16 PROCEDURE — 99308 SBSQ NF CARE LOW MDM 20: CPT | Performed by: INTERNAL MEDICINE

## 2024-05-16 NOTE — LETTER
Patient: Nato Downs  : 1970    Encounter Date: 2024    Subjective  Patient ID: Nato Downs is a 53 y.o. male who is long term resident being seen and evaluated for multiple medical problems.    HPI   This 53-year-old male patient is resting comfortably in the common area participating in ice cream social.  He has no complaints of pain or shortness of breath me at this time and nursing has no new adverse events reported to me.    Current high risk medication:  Klonopin  Diastat  Ditropan  Keppra  Risperdal  Trileptal    Laboratory examination recently:  Vitamin B12 823  TSH 0.74  Keppra 27  Trileptal 11.7  CMP normal  Hemoglobin 13.4    Review of Systems   Constitutional:  Negative for chills, fatigue and fever.   Respiratory:  Negative for cough and shortness of breath.    Cardiovascular:  Negative for chest pain and palpitations.   Gastrointestinal:  Negative for abdominal pain, constipation, diarrhea, nausea and vomiting.   Genitourinary:  Negative for difficulty urinating.       Objective  /80   Pulse 86   Temp 36.7 °C (98 °F)   Resp 18   Wt 128 kg (283 lb)   SpO2 96%   BMI 34.45 kg/m²     Physical Exam  Constitutional:       General: He is not in acute distress.     Appearance: He is obese.   HENT:      Head: Normocephalic and atraumatic.   Eyes:      Conjunctiva/sclera: Conjunctivae normal.   Cardiovascular:      Rate and Rhythm: Normal rate and regular rhythm.   Pulmonary:      Effort: Pulmonary effort is normal. No respiratory distress.      Breath sounds: Normal breath sounds.   Abdominal:      General: Bowel sounds are normal. There is no distension.      Palpations: Abdomen is soft.      Tenderness: There is no abdominal tenderness.      Comments: Ostomy draining liquid brown, no erythema noted around site.    Genitourinary:     Comments: Suprapubic cath draining clear yellow  Musculoskeletal:         General: Normal range of motion.      Right lower leg: No edema.       Left lower leg: No edema.   Skin:     General: Skin is warm and dry.   Neurological:      General: No focal deficit present.      Mental Status: He is alert. Mental status is at baseline.   Psychiatric:         Mood and Affect: Mood normal.         Behavior: Behavior normal.         Assessment/Plan  Problem List Items Addressed This Visit             ICD-10-CM    Ileostomy in place (Multi) Z93.2    Obesity E66.9    Seizure disorder (Multi) - Primary G40.909    Suprapubic catheter (Multi) Z93.59    Moderate dementia without behavioral disturbance, psychotic disturbance, mood disturbance, or anxiety, unspecified dementia type (Multi) F03.B0     8.  We will continue with restorative and supportive care as patient tolerates    B.  Laboratory examinations will next be due 6 months after the last ones to monitor his medications and seizure meds    C.  The patient's prognosis is guarded.        Electronically Signed By: Amilcar Quezada MD   5/30/24  6:09 PM

## 2024-05-20 VITALS
TEMPERATURE: 98 F | OXYGEN SATURATION: 96 % | RESPIRATION RATE: 18 BRPM | DIASTOLIC BLOOD PRESSURE: 80 MMHG | SYSTOLIC BLOOD PRESSURE: 128 MMHG | WEIGHT: 283 LBS | HEART RATE: 86 BPM | BODY MASS INDEX: 34.45 KG/M2

## 2024-05-20 NOTE — PROGRESS NOTES
Subjective   Patient ID: Nato Downs is a 53 y.o. male who is long term resident being seen and evaluated for multiple medical problems.    HPI   This 53-year-old male patient is resting comfortably in the common area participating in ice cream social.  He has no complaints of pain or shortness of breath me at this time and nursing has no new adverse events reported to me.    Current high risk medication:  Klonopin  Diastat  Ditropan  Keppra  Risperdal  Trileptal    Laboratory examination recently:  Vitamin B12 823  TSH 0.74  Keppra 27  Trileptal 11.7  CMP normal  Hemoglobin 13.4    Review of Systems   Constitutional:  Negative for chills, fatigue and fever.   Respiratory:  Negative for cough and shortness of breath.    Cardiovascular:  Negative for chest pain and palpitations.   Gastrointestinal:  Negative for abdominal pain, constipation, diarrhea, nausea and vomiting.   Genitourinary:  Negative for difficulty urinating.       Objective   /80   Pulse 86   Temp 36.7 °C (98 °F)   Resp 18   Wt 128 kg (283 lb)   SpO2 96%   BMI 34.45 kg/m²     Physical Exam  Constitutional:       General: He is not in acute distress.     Appearance: He is obese.   HENT:      Head: Normocephalic and atraumatic.   Eyes:      Conjunctiva/sclera: Conjunctivae normal.   Cardiovascular:      Rate and Rhythm: Normal rate and regular rhythm.   Pulmonary:      Effort: Pulmonary effort is normal. No respiratory distress.      Breath sounds: Normal breath sounds.   Abdominal:      General: Bowel sounds are normal. There is no distension.      Palpations: Abdomen is soft.      Tenderness: There is no abdominal tenderness.      Comments: Ostomy draining liquid brown, no erythema noted around site.    Genitourinary:     Comments: Suprapubic cath draining clear yellow  Musculoskeletal:         General: Normal range of motion.      Right lower leg: No edema.      Left lower leg: No edema.   Skin:     General: Skin is warm and dry.    Neurological:      General: No focal deficit present.      Mental Status: He is alert. Mental status is at baseline.   Psychiatric:         Mood and Affect: Mood normal.         Behavior: Behavior normal.         Assessment/Plan   Problem List Items Addressed This Visit             ICD-10-CM    Ileostomy in place (Multi) Z93.2    Obesity E66.9    Seizure disorder (Multi) - Primary G40.909    Suprapubic catheter (Multi) Z93.59    Moderate dementia without behavioral disturbance, psychotic disturbance, mood disturbance, or anxiety, unspecified dementia type (Multi) F03.B0     8.  We will continue with restorative and supportive care as patient tolerates    B.  Laboratory examinations will next be due 6 months after the last ones to monitor his medications and seizure meds    C.  The patient's prognosis is guarded.

## 2024-05-21 ENCOUNTER — NURSING HOME VISIT (OUTPATIENT)
Dept: POST ACUTE CARE | Facility: EXTERNAL LOCATION | Age: 54
End: 2024-05-21
Payer: MEDICARE

## 2024-05-21 ENCOUNTER — LAB REQUISITION (OUTPATIENT)
Dept: LAB | Facility: HOSPITAL | Age: 54
End: 2024-05-21

## 2024-05-21 VITALS
WEIGHT: 285 LBS | BODY MASS INDEX: 34.69 KG/M2 | SYSTOLIC BLOOD PRESSURE: 128 MMHG | TEMPERATURE: 97.9 F | OXYGEN SATURATION: 97 % | RESPIRATION RATE: 18 BRPM | DIASTOLIC BLOOD PRESSURE: 80 MMHG | HEART RATE: 86 BPM

## 2024-05-21 DIAGNOSIS — F39 MOOD DISORDER (CMS-HCC): Primary | ICD-10-CM

## 2024-05-21 DIAGNOSIS — R41.82 ALTERED MENTAL STATUS, UNSPECIFIED: ICD-10-CM

## 2024-05-21 DIAGNOSIS — R53.81 PHYSICAL DEBILITY: ICD-10-CM

## 2024-05-21 DIAGNOSIS — Z93.2 ILEOSTOMY IN PLACE (MULTI): ICD-10-CM

## 2024-05-21 DIAGNOSIS — G40.909 SEIZURE DISORDER (MULTI): ICD-10-CM

## 2024-05-21 DIAGNOSIS — N31.9 NEUROGENIC BLADDER: ICD-10-CM

## 2024-05-21 LAB
ALBUMIN SERPL BCP-MCNC: 3.6 G/DL (ref 3.4–5)
ALP SERPL-CCNC: 85 U/L (ref 33–120)
ALT SERPL W P-5'-P-CCNC: 20 U/L (ref 10–52)
ANION GAP SERPL CALC-SCNC: 12 MMOL/L (ref 10–20)
AST SERPL W P-5'-P-CCNC: 15 U/L (ref 9–39)
BILIRUB SERPL-MCNC: 0.3 MG/DL (ref 0–1.2)
BUN SERPL-MCNC: 14 MG/DL (ref 6–23)
CALCIUM SERPL-MCNC: 8.6 MG/DL (ref 8.6–10.3)
CHLORIDE SERPL-SCNC: 99 MMOL/L (ref 98–107)
CO2 SERPL-SCNC: 28 MMOL/L (ref 21–32)
CREAT SERPL-MCNC: 1.07 MG/DL (ref 0.5–1.3)
EGFRCR SERPLBLD CKD-EPI 2021: 82 ML/MIN/1.73M*2
ERYTHROCYTE [DISTWIDTH] IN BLOOD BY AUTOMATED COUNT: 14.4 % (ref 11.5–14.5)
GLUCOSE SERPL-MCNC: 121 MG/DL (ref 74–99)
HCT VFR BLD AUTO: 36.5 % (ref 41–52)
HGB BLD-MCNC: 11.7 G/DL (ref 13.5–17.5)
MCH RBC QN AUTO: 27.9 PG (ref 26–34)
MCHC RBC AUTO-ENTMCNC: 32.1 G/DL (ref 32–36)
MCV RBC AUTO: 87 FL (ref 80–100)
NRBC BLD-RTO: 0 /100 WBCS (ref 0–0)
PLATELET # BLD AUTO: 301 X10*3/UL (ref 150–450)
POTASSIUM SERPL-SCNC: 3.9 MMOL/L (ref 3.5–5.3)
PROT SERPL-MCNC: 6.2 G/DL (ref 6.4–8.2)
RBC # BLD AUTO: 4.2 X10*6/UL (ref 4.5–5.9)
SODIUM SERPL-SCNC: 135 MMOL/L (ref 136–145)
WBC # BLD AUTO: 9.7 X10*3/UL (ref 4.4–11.3)

## 2024-05-21 PROCEDURE — 85027 COMPLETE CBC AUTOMATED: CPT

## 2024-05-21 PROCEDURE — 80053 COMPREHEN METABOLIC PANEL: CPT

## 2024-05-21 PROCEDURE — 99309 SBSQ NF CARE MODERATE MDM 30: CPT | Performed by: NURSE PRACTITIONER

## 2024-05-21 ASSESSMENT — ENCOUNTER SYMPTOMS
ABDOMINAL PAIN: 0
CONSTIPATION: 0
SHORTNESS OF BREATH: 0
PALPITATIONS: 0
VOMITING: 0
DIARRHEA: 0
FATIGUE: 0
FEVER: 0
CHILLS: 0
COUGH: 0
NAUSEA: 0
DIFFICULTY URINATING: 0

## 2024-05-21 NOTE — PROGRESS NOTES
Subjective   Nato Downs is a 53 y.o. male requested by staff to be evaluated due to outbursts, change in behavior.   HPI  He has has 2 behavioral outburtsts in the last 2 days.  Yesterday he was yelling in his room, emptying his drawers and throwing things in his room.   He was unable to state why he was doing this and was redirectable.    He does not remember the incident when I asked him about it today.  Vitals were stable and no other sx.  This morning he was in the common area and the TV was turned off as an activity was starting and he became upset and yelling, including yelling at another resident.  He was redirectable again and calmed down.  He states he is feeling well.   No seizure like acitivty associated with the events and no other sx.     Review of Systems   Constitutional:  Negative for chills, fatigue and fever.   Respiratory:  Negative for cough and shortness of breath.    Cardiovascular:  Negative for chest pain and palpitations.   Gastrointestinal:  Negative for abdominal pain, constipation, diarrhea, nausea and vomiting.   Genitourinary:  Negative for difficulty urinating.       Objective   /80   Pulse 86   Temp 36.6 °C (97.9 °F)   Resp 18   Wt 129 kg (285 lb)   SpO2 97%   BMI 34.69 kg/m²     Physical Exam  Constitutional:       General: He is not in acute distress.     Appearance: He is obese.   HENT:      Head: Normocephalic and atraumatic.   Eyes:      Conjunctiva/sclera: Conjunctivae normal.   Cardiovascular:      Rate and Rhythm: Normal rate and regular rhythm.   Pulmonary:      Effort: Pulmonary effort is normal. No respiratory distress.      Breath sounds: Normal breath sounds.   Abdominal:      General: Bowel sounds are normal. There is no distension.      Palpations: Abdomen is soft.      Tenderness: There is no abdominal tenderness.      Comments: Ostomy draining liquid brown, no erythema noted around site.    Genitourinary:     Comments: Suprapubic cath draining clear  yellow  Musculoskeletal:         General: Normal range of motion.      Right lower leg: No edema.      Left lower leg: No edema.   Skin:     General: Skin is warm and dry.   Neurological:      General: No focal deficit present.      Mental Status: He is alert. Mental status is at baseline.   Psychiatric:         Mood and Affect: Mood normal.         Behavior: Behavior normal.         Assessment/Plan   Problem List Items Addressed This Visit       Ileostomy in place (Multi)     Functioning well.           Mood disorder (CMS-HCC) - Primary     He has had 2 recent outbursts, yesterday morning was throwing things in his room, emptying drawers in his room.   He was unable to state why he did that or what was upsetting him.   He was redirectable and not aggressive.    He does not remember the incident.  This morning while sitting in the common area watching TV he was upset and started yelling because the TV was turned off as an activity was starting.  He also started yelling at another resident at this time, he was redirectable again.   Will check labs and urine to evaluate for any underlying pathology.    He recently had keppra and trileptal levels checked and were therapeutic.  He takes risperdal and clonazepam.           Neurogenic bladder     Suprapubic cath.           Physical debility     Ambulates independently         Seizure disorder (Multi)        Staff to monitor and notify for any recurrence of seizure activity          labs/meds/orders reviewed  staff to monitor and notify for any changes.  continue with supportive and restorative care  next full labs 10/24 and prn.   Check labs and UA due to 2 behavioral incidents to evaluate for any underlying pathology.   HE is sitting up in chair and appears at baseline at present.

## 2024-05-21 NOTE — LETTER
Patient: Nato Downs  : 1970    Encounter Date: 2024    Subjective  Nato Downs is a 53 y.o. male requested by staff to be evaluated due to outbursts, change in behavior.   HPI  He has has 2 behavioral outburtsts in the last 2 days.  Yesterday he was yelling in his room, emptying his drawers and throwing things in his room.   He was unable to state why he was doing this and was redirectable.    He does not remember the incident when I asked him about it today.  Vitals were stable and no other sx.  This morning he was in the common area and the TV was turned off as an activity was starting and he became upset and yelling, including yelling at another resident.  He was redirectable again and calmed down.  He states he is feeling well.   No seizure like acitivty associated with the events and no other sx.     Review of Systems   Constitutional:  Negative for chills, fatigue and fever.   Respiratory:  Negative for cough and shortness of breath.    Cardiovascular:  Negative for chest pain and palpitations.   Gastrointestinal:  Negative for abdominal pain, constipation, diarrhea, nausea and vomiting.   Genitourinary:  Negative for difficulty urinating.       Objective  /80   Pulse 86   Temp 36.6 °C (97.9 °F)   Resp 18   Wt 129 kg (285 lb)   SpO2 97%   BMI 34.69 kg/m²     Physical Exam  Constitutional:       General: He is not in acute distress.     Appearance: He is obese.   HENT:      Head: Normocephalic and atraumatic.   Eyes:      Conjunctiva/sclera: Conjunctivae normal.   Cardiovascular:      Rate and Rhythm: Normal rate and regular rhythm.   Pulmonary:      Effort: Pulmonary effort is normal. No respiratory distress.      Breath sounds: Normal breath sounds.   Abdominal:      General: Bowel sounds are normal. There is no distension.      Palpations: Abdomen is soft.      Tenderness: There is no abdominal tenderness.      Comments: Ostomy draining liquid brown, no erythema noted around  site.    Genitourinary:     Comments: Suprapubic cath draining clear yellow  Musculoskeletal:         General: Normal range of motion.      Right lower leg: No edema.      Left lower leg: No edema.   Skin:     General: Skin is warm and dry.   Neurological:      General: No focal deficit present.      Mental Status: He is alert. Mental status is at baseline.   Psychiatric:         Mood and Affect: Mood normal.         Behavior: Behavior normal.         Assessment/Plan  Problem List Items Addressed This Visit       Ileostomy in place (Multi)     Functioning well.           Mood disorder (CMS-HCC) - Primary     He has had 2 recent outbursts, yesterday morning was throwing things in his room, emptying drawers in his room.   He was unable to state why he did that or what was upsetting him.   He was redirectable and not aggressive.    He does not remember the incident.  This morning while sitting in the common area watching TV he was upset and started yelling because the TV was turned off as an activity was starting.  He also started yelling at another resident at this time, he was redirectable again.   Will check labs and urine to evaluate for any underlying pathology.    He recently had keppra and trileptal levels checked and were therapeutic.  He takes risperdal and clonazepam.           Neurogenic bladder     Suprapubic cath.           Physical debility     Ambulates independently         Seizure disorder (Multi)        Staff to monitor and notify for any recurrence of seizure activity          labs/meds/orders reviewed  staff to monitor and notify for any changes.  continue with supportive and restorative care  next full labs 10/24 and prn.   Check labs and UA due to 2 behavioral incidents to evaluate for any underlying pathology.   HE is sitting up in chair and appears at baseline at present.        Electronically Signed By: NASREEN Larios   5/21/24 12:53 PM

## 2024-05-21 NOTE — ASSESSMENT & PLAN NOTE
He has had 2 recent outbursts, yesterday morning was throwing things in his room, emptying drawers in his room.   He was unable to state why he did that or what was upsetting him.   He was redirectable and not aggressive.    He does not remember the incident.  This morning while sitting in the common area watching TV he was upset and started yelling because the TV was turned off as an activity was starting.  He also started yelling at another resident at this time, he was redirectable again.   Will check labs and urine to evaluate for any underlying pathology.    He recently had keppra and trileptal levels checked and were therapeutic.  He takes risperdal and clonazepam.

## 2024-05-30 ASSESSMENT — ENCOUNTER SYMPTOMS
DIFFICULTY URINATING: 0
CHILLS: 0
VOMITING: 0
FATIGUE: 0
SHORTNESS OF BREATH: 0
NAUSEA: 0
COUGH: 0
PALPITATIONS: 0
DIARRHEA: 0
CONSTIPATION: 0
FEVER: 0
ABDOMINAL PAIN: 0

## 2024-06-21 ENCOUNTER — NURSING HOME VISIT (OUTPATIENT)
Dept: POST ACUTE CARE | Facility: EXTERNAL LOCATION | Age: 54
End: 2024-06-21
Payer: MEDICARE

## 2024-06-21 VITALS
HEART RATE: 79 BPM | BODY MASS INDEX: 34.69 KG/M2 | TEMPERATURE: 97.9 F | OXYGEN SATURATION: 96 % | SYSTOLIC BLOOD PRESSURE: 132 MMHG | WEIGHT: 285 LBS | RESPIRATION RATE: 18 BRPM | DIASTOLIC BLOOD PRESSURE: 70 MMHG

## 2024-06-21 DIAGNOSIS — R53.81 PHYSICAL DEBILITY: ICD-10-CM

## 2024-06-21 DIAGNOSIS — N32.2 BLADDER FISTULA: Primary | ICD-10-CM

## 2024-06-21 DIAGNOSIS — E66.9 OBESITY, UNSPECIFIED CLASSIFICATION, UNSPECIFIED OBESITY TYPE, UNSPECIFIED WHETHER SERIOUS COMORBIDITY PRESENT: ICD-10-CM

## 2024-06-21 DIAGNOSIS — F79 ACQUIRED INTELLECTUAL DISABILITY: ICD-10-CM

## 2024-06-21 DIAGNOSIS — Z93.59 SUPRAPUBIC CATHETER (MULTI): ICD-10-CM

## 2024-06-21 DIAGNOSIS — G40.909 SEIZURE DISORDER (MULTI): ICD-10-CM

## 2024-06-21 DIAGNOSIS — N31.9 NEUROGENIC BLADDER: ICD-10-CM

## 2024-06-21 DIAGNOSIS — Q07.9 CONGENITAL ENCEPHALOPATHY (MULTI): ICD-10-CM

## 2024-06-21 PROCEDURE — 99309 SBSQ NF CARE MODERATE MDM 30: CPT | Performed by: INTERNAL MEDICINE

## 2024-06-21 NOTE — PROGRESS NOTES
Subjective   Patient ID: Nato Downs is a 53 y.o. male who is long term resident being seen and evaluated for multiple medical problems.    HPI   This 53-year-old male patient is resting comfortabl in his wheelchair no distress.  Nursing reports the patient leaking urine from his lower abdominal region from the area of prior suprapubic catheter placement.  The patient himself has no complaints whatsoever.    Current high risk medication:  Clonazepam  Ditropan  Keppra  Risperidone  Vistaril    Laboratory examination from May 20, 2024:  Urine culture shows contamination  Creatinine 1.07  Albumin 3.6  Liver injury test normal  Hemoglobin 11.7    Review of Systems   Constitutional:  Negative for chills, fatigue and fever.   Respiratory:  Negative for cough and shortness of breath.    Cardiovascular:  Negative for chest pain and palpitations.   Gastrointestinal:  Negative for abdominal pain, constipation, diarrhea, nausea and vomiting.   Genitourinary:  Negative for difficulty urinating.       Objective   /70   Pulse 79   Temp 36.6 °C (97.9 °F)   Resp 18   Wt 129 kg (285 lb)   SpO2 96%   BMI 34.69 kg/m²     Physical Exam  Constitutional:       General: He is not in acute distress.     Appearance: He is obese.   HENT:      Head: Normocephalic and atraumatic.   Eyes:      Conjunctiva/sclera: Conjunctivae normal.   Cardiovascular:      Rate and Rhythm: Normal rate and regular rhythm.   Pulmonary:      Effort: Pulmonary effort is normal. No respiratory distress.      Breath sounds: Normal breath sounds.   Abdominal:      General: Bowel sounds are normal. There is no distension.      Palpations: Abdomen is soft.      Tenderness: There is no abdominal tenderness.      Comments: Ostomy draining liquid brown, no erythema noted around site.    Genitourinary:     Comments: Suprapubic cath draining clear yellow.  The patient has a pendulous pannus overlying the suprapubic catheter site.  Per nursing there is urine  leaking from his prior suprapubic catheter site suggestive of ongoing bladder skin fistula.  Musculoskeletal:         General: Normal range of motion.      Right lower leg: No edema.      Left lower leg: No edema.   Skin:     General: Skin is warm and dry.   Neurological:      General: No focal deficit present.      Mental Status: He is alert. Mental status is at baseline.   Psychiatric:         Mood and Affect: Mood normal.         Behavior: Behavior normal.         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  We will continue with restorative and supportive care as the patient tolerance    B.  The patient should see his urologist regarding the incompletely closed prior suprapubic catheter site suggestive of ongoing bladder fistula.  The patient may require fistulectomy to achieve control of ongoing urinary output from his pubic region.  It is felt that the patient's morbid obesity and pendulous pannus has something to do with his ongoing suprapubic catheter problems.  If needed then the patient might need initiation of weight reduction medicine in the form of GLP-1.    C.  Laboratory examinations will continue to be monitored on an ongoing as-needed basis and should next be due in November 2024 as needed    D.  The patient's prognosis is guarded.

## 2024-06-21 NOTE — LETTER
Patient: Nato Downs  : 1970    Encounter Date: 2024    Subjective  Patient ID: Nato Downs is a 53 y.o. male who is long term resident being seen and evaluated for multiple medical problems.    HPI   This 53-year-old male patient is resting comfortabl in his wheelchair no distress.  Nursing reports the patient leaking urine from his lower abdominal region from the area of prior suprapubic catheter placement.  The patient himself has no complaints whatsoever.    Current high risk medication:  Clonazepam  Ditropan  Keppra  Risperidone  Vistaril    Laboratory examination from May 20, 2024:  Urine culture shows contamination  Creatinine 1.07  Albumin 3.6  Liver injury test normal  Hemoglobin 11.7    Review of Systems   Constitutional:  Negative for chills, fatigue and fever.   Respiratory:  Negative for cough and shortness of breath.    Cardiovascular:  Negative for chest pain and palpitations.   Gastrointestinal:  Negative for abdominal pain, constipation, diarrhea, nausea and vomiting.   Genitourinary:  Negative for difficulty urinating.       Objective  /70   Pulse 79   Temp 36.6 °C (97.9 °F)   Resp 18   Wt 129 kg (285 lb)   SpO2 96%   BMI 34.69 kg/m²     Physical Exam  Constitutional:       General: He is not in acute distress.     Appearance: He is obese.   HENT:      Head: Normocephalic and atraumatic.   Eyes:      Conjunctiva/sclera: Conjunctivae normal.   Cardiovascular:      Rate and Rhythm: Normal rate and regular rhythm.   Pulmonary:      Effort: Pulmonary effort is normal. No respiratory distress.      Breath sounds: Normal breath sounds.   Abdominal:      General: Bowel sounds are normal. There is no distension.      Palpations: Abdomen is soft.      Tenderness: There is no abdominal tenderness.      Comments: Ostomy draining liquid brown, no erythema noted around site.    Genitourinary:     Comments: Suprapubic cath draining clear yellow.  The patient has a pendulous pannus  overlying the suprapubic catheter site.  Per nursing there is urine leaking from his prior suprapubic catheter site suggestive of ongoing bladder skin fistula.  Musculoskeletal:         General: Normal range of motion.      Right lower leg: No edema.      Left lower leg: No edema.   Skin:     General: Skin is warm and dry.   Neurological:      General: No focal deficit present.      Mental Status: He is alert. Mental status is at baseline.   Psychiatric:         Mood and Affect: Mood normal.         Behavior: Behavior normal.         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  We will continue with restorative and supportive care as the patient tolerance    B.  The patient should see his urologist regarding the incompletely closed prior suprapubic catheter site suggestive of ongoing bladder fistula.  The patient may require fistulectomy to achieve control of ongoing urinary output from his pubic region.  It is felt that the patient's morbid obesity and pendulous pannus has something to do with his ongoing suprapubic catheter problems.  If needed then the patient might need initiation of weight reduction medicine in the form of GLP-1.    C.  Laboratory examinations will continue to be monitored on an ongoing as-needed basis and should next be due in November 2024 as needed    D.  The patient's prognosis is guarded.      Electronically Signed By: Amilcar Quezada MD   6/25/24 11:41 AM

## 2024-06-25 ENCOUNTER — NURSING HOME VISIT (OUTPATIENT)
Dept: POST ACUTE CARE | Facility: EXTERNAL LOCATION | Age: 54
End: 2024-06-25
Payer: MEDICARE

## 2024-06-25 VITALS
OXYGEN SATURATION: 95 % | DIASTOLIC BLOOD PRESSURE: 80 MMHG | HEART RATE: 79 BPM | TEMPERATURE: 98 F | BODY MASS INDEX: 34.69 KG/M2 | RESPIRATION RATE: 18 BRPM | SYSTOLIC BLOOD PRESSURE: 130 MMHG | WEIGHT: 285 LBS

## 2024-06-25 DIAGNOSIS — G40.909 SEIZURE DISORDER (MULTI): ICD-10-CM

## 2024-06-25 DIAGNOSIS — F03.B0 MODERATE DEMENTIA WITHOUT BEHAVIORAL DISTURBANCE, PSYCHOTIC DISTURBANCE, MOOD DISTURBANCE, OR ANXIETY, UNSPECIFIED DEMENTIA TYPE (MULTI): ICD-10-CM

## 2024-06-25 DIAGNOSIS — Z93.2 ILEOSTOMY IN PLACE (MULTI): Primary | ICD-10-CM

## 2024-06-25 DIAGNOSIS — Z93.59 SUPRAPUBIC CATHETER (MULTI): ICD-10-CM

## 2024-06-25 PROBLEM — N32.2 BLADDER FISTULA: Status: ACTIVE | Noted: 2024-06-25

## 2024-06-25 PROCEDURE — 99309 SBSQ NF CARE MODERATE MDM 30: CPT | Performed by: NURSE PRACTITIONER

## 2024-06-25 ASSESSMENT — ENCOUNTER SYMPTOMS
DIARRHEA: 0
FEVER: 0
CHILLS: 0
COUGH: 0
DIFFICULTY URINATING: 0
FATIGUE: 0
DIFFICULTY URINATING: 0
PALPITATIONS: 0
CONSTIPATION: 0
VOMITING: 0
FEVER: 0
NAUSEA: 0
FATIGUE: 0
CHILLS: 0
ABDOMINAL PAIN: 0
ABDOMINAL PAIN: 0
PALPITATIONS: 0
SHORTNESS OF BREATH: 0
VOMITING: 0
CONSTIPATION: 0
NAUSEA: 0
SHORTNESS OF BREATH: 0
COUGH: 0
DIARRHEA: 0

## 2024-06-25 NOTE — LETTER
Patient: Nato Downs  : 1970    Encounter Date: 2024    Subjective  Nato Downs is a 53 y.o. male requested by staff to be evaluated due to outbursts, change in behavior.   HPI  He had a few episodes recently where he pulled off his ostomy bag and was agitated per staff.   He was unable to state why he was doing this and was redirectable.    He does not remember the incident when I asked him about it today.  Vitals were stable and no other sx.  He states he is feeling well.   No seizure like acitivty associated with the events and no other sx.     Review of Systems   Constitutional:  Negative for chills, fatigue and fever.   Respiratory:  Negative for cough and shortness of breath.    Cardiovascular:  Negative for chest pain and palpitations.   Gastrointestinal:  Negative for abdominal pain, constipation, diarrhea, nausea and vomiting.   Genitourinary:  Negative for difficulty urinating.       Objective  /80   Pulse 79   Temp 36.7 °C (98 °F)   Resp 18   Wt 129 kg (285 lb)   SpO2 95%   BMI 34.69 kg/m²     Physical Exam  Constitutional:       General: He is not in acute distress.     Appearance: He is obese.   HENT:      Head: Normocephalic and atraumatic.   Eyes:      Conjunctiva/sclera: Conjunctivae normal.   Cardiovascular:      Rate and Rhythm: Normal rate and regular rhythm.   Pulmonary:      Effort: Pulmonary effort is normal. No respiratory distress.      Breath sounds: Normal breath sounds.   Abdominal:      General: Bowel sounds are normal. There is no distension.      Palpations: Abdomen is soft.      Tenderness: There is no abdominal tenderness.      Comments: Ostomy draining liquid brown, no erythema noted around site.    Genitourinary:     Comments: Suprapubic cath draining clear yellow  Musculoskeletal:         General: Normal range of motion.      Right lower leg: No edema.      Left lower leg: No edema.   Skin:     General: Skin is warm and dry.   Neurological:       General: No focal deficit present.      Mental Status: He is alert. Mental status is at baseline.   Psychiatric:         Mood and Affect: Mood normal.         Behavior: Behavior normal.         Assessment/Plan  Problem List Items Addressed This Visit       Ileostomy in place (Multi) - Primary     Per staff he is at times getting agitated and pulls off ostomy bag and throws it in the room.  Grzegorz states he does not remember doing this.  Will have staff keep a log of behaviors and may need to adjust risperdal based on frequency/timing of events.   He is sitting up in chair and states he is feeling well.          Seizure disorder (Multi)        Staff to monitor and notify for any recurrence of seizure activity           Suprapubic catheter (Multi)     Functioning well.     staff to monitor and notify for any changes.           Moderate dementia without behavioral disturbance, psychotic disturbance, mood disturbance, or anxiety, unspecified dementia type (Multi)     Mild to moderate.         labs/meds/orders reviewed  staff to monitor and notify for any changes.  continue with supportive and restorative care  next full labs 10/24 and prn.   Risperidone was increased last month through psychiatry, staff to monitor behaviors and may need to consider further med adjustment.   HE is sitting up in chair and appears at baseline at present.        Electronically Signed By: NASREEN Larios   6/25/24 10:54 AM

## 2024-06-25 NOTE — PROGRESS NOTES
Subjective   Nato Downs is a 53 y.o. male requested by staff to be evaluated due to outbursts, change in behavior.   HPI  He had a few episodes recently where he pulled off his ostomy bag and was agitated per staff.   He was unable to state why he was doing this and was redirectable.    He does not remember the incident when I asked him about it today.  Vitals were stable and no other sx.  He states he is feeling well.   No seizure like acitivty associated with the events and no other sx.     Review of Systems   Constitutional:  Negative for chills, fatigue and fever.   Respiratory:  Negative for cough and shortness of breath.    Cardiovascular:  Negative for chest pain and palpitations.   Gastrointestinal:  Negative for abdominal pain, constipation, diarrhea, nausea and vomiting.   Genitourinary:  Negative for difficulty urinating.       Objective   /80   Pulse 79   Temp 36.7 °C (98 °F)   Resp 18   Wt 129 kg (285 lb)   SpO2 95%   BMI 34.69 kg/m²     Physical Exam  Constitutional:       General: He is not in acute distress.     Appearance: He is obese.   HENT:      Head: Normocephalic and atraumatic.   Eyes:      Conjunctiva/sclera: Conjunctivae normal.   Cardiovascular:      Rate and Rhythm: Normal rate and regular rhythm.   Pulmonary:      Effort: Pulmonary effort is normal. No respiratory distress.      Breath sounds: Normal breath sounds.   Abdominal:      General: Bowel sounds are normal. There is no distension.      Palpations: Abdomen is soft.      Tenderness: There is no abdominal tenderness.      Comments: Ostomy draining liquid brown, no erythema noted around site.    Genitourinary:     Comments: Suprapubic cath draining clear yellow  Musculoskeletal:         General: Normal range of motion.      Right lower leg: No edema.      Left lower leg: No edema.   Skin:     General: Skin is warm and dry.   Neurological:      General: No focal deficit present.      Mental Status: He is alert. Mental  status is at baseline.   Psychiatric:         Mood and Affect: Mood normal.         Behavior: Behavior normal.         Assessment/Plan   Problem List Items Addressed This Visit       Ileostomy in place (Multi) - Primary     Per staff he is at times getting agitated and pulls off ostomy bag and throws it in the room.  Grzegorz states he does not remember doing this.  Will have staff keep a log of behaviors and may need to adjust risperdal based on frequency/timing of events.   He is sitting up in chair and states he is feeling well.          Seizure disorder (Multi)        Staff to monitor and notify for any recurrence of seizure activity           Suprapubic catheter (Multi)     Functioning well.     staff to monitor and notify for any changes.           Moderate dementia without behavioral disturbance, psychotic disturbance, mood disturbance, or anxiety, unspecified dementia type (Multi)     Mild to moderate.         labs/meds/orders reviewed  staff to monitor and notify for any changes.  continue with supportive and restorative care  next full labs 10/24 and prn.   Risperidone was increased last month through psychiatry, staff to monitor behaviors and may need to consider further med adjustment.   HE is sitting up in chair and appears at baseline at present.

## 2024-06-25 NOTE — ASSESSMENT & PLAN NOTE
Per staff he is at times getting agitated and pulls off ostomy bag and throws it in the room.  Grzeogrz states he does not remember doing this.  Will have staff keep a log of behaviors and may need to adjust risperdal based on frequency/timing of events.   He is sitting up in chair and states he is feeling well.

## 2024-07-03 ENCOUNTER — APPOINTMENT (OUTPATIENT)
Dept: RADIOLOGY | Facility: HOSPITAL | Age: 54
DRG: 416 | End: 2024-07-03
Payer: MEDICARE

## 2024-07-03 ENCOUNTER — HOSPITAL ENCOUNTER (INPATIENT)
Facility: HOSPITAL | Age: 54
DRG: 416 | End: 2024-07-03
Attending: STUDENT IN AN ORGANIZED HEALTH CARE EDUCATION/TRAINING PROGRAM | Admitting: INTERNAL MEDICINE
Payer: MEDICARE

## 2024-07-03 DIAGNOSIS — K81.0 ACUTE CHOLECYSTITIS: Primary | ICD-10-CM

## 2024-07-03 LAB
ALBUMIN SERPL BCP-MCNC: 3.7 G/DL (ref 3.4–5)
ALP SERPL-CCNC: 152 U/L (ref 33–120)
ALT SERPL W P-5'-P-CCNC: 127 U/L (ref 10–52)
ANION GAP SERPL CALC-SCNC: 12 MMOL/L (ref 10–20)
APPEARANCE UR: CLEAR
AST SERPL W P-5'-P-CCNC: 52 U/L (ref 9–39)
BACTERIA #/AREA URNS AUTO: ABNORMAL /HPF
BASOPHILS # BLD AUTO: 0.05 X10*3/UL (ref 0–0.1)
BASOPHILS NFR BLD AUTO: 0.4 %
BILIRUB SERPL-MCNC: 3.3 MG/DL (ref 0–1.2)
BILIRUB UR STRIP.AUTO-MCNC: ABNORMAL MG/DL
BUN SERPL-MCNC: 11 MG/DL (ref 6–23)
CALCIUM SERPL-MCNC: 8.5 MG/DL (ref 8.6–10.3)
CHLORIDE SERPL-SCNC: 97 MMOL/L (ref 98–107)
CO2 SERPL-SCNC: 27 MMOL/L (ref 21–32)
COLOR UR: YELLOW
CREAT SERPL-MCNC: 1.15 MG/DL (ref 0.5–1.3)
EGFRCR SERPLBLD CKD-EPI 2021: 75 ML/MIN/1.73M*2
EOSINOPHIL # BLD AUTO: 0.13 X10*3/UL (ref 0–0.7)
EOSINOPHIL NFR BLD AUTO: 1.1 %
ERYTHROCYTE [DISTWIDTH] IN BLOOD BY AUTOMATED COUNT: 14.3 % (ref 11.5–14.5)
GLUCOSE SERPL-MCNC: 119 MG/DL (ref 74–99)
GLUCOSE UR STRIP.AUTO-MCNC: NORMAL MG/DL
HCT VFR BLD AUTO: 39.7 % (ref 41–52)
HGB BLD-MCNC: 12.7 G/DL (ref 13.5–17.5)
IMM GRANULOCYTES # BLD AUTO: 0.05 X10*3/UL (ref 0–0.7)
IMM GRANULOCYTES NFR BLD AUTO: 0.4 % (ref 0–0.9)
KETONES UR STRIP.AUTO-MCNC: NEGATIVE MG/DL
LEUKOCYTE ESTERASE UR QL STRIP.AUTO: ABNORMAL
LIPASE SERPL-CCNC: 18 U/L (ref 9–82)
LYMPHOCYTES # BLD AUTO: 0.88 X10*3/UL (ref 1.2–4.8)
LYMPHOCYTES NFR BLD AUTO: 7.5 %
MCH RBC QN AUTO: 28 PG (ref 26–34)
MCHC RBC AUTO-ENTMCNC: 32 G/DL (ref 32–36)
MCV RBC AUTO: 87 FL (ref 80–100)
MONOCYTES # BLD AUTO: 1.47 X10*3/UL (ref 0.1–1)
MONOCYTES NFR BLD AUTO: 12.6 %
NEUTROPHILS # BLD AUTO: 9.1 X10*3/UL (ref 1.2–7.7)
NEUTROPHILS NFR BLD AUTO: 78 %
NITRITE UR QL STRIP.AUTO: ABNORMAL
NRBC BLD-RTO: 0 /100 WBCS (ref 0–0)
PH UR STRIP.AUTO: 6 [PH]
PLATELET # BLD AUTO: 290 X10*3/UL (ref 150–450)
POTASSIUM SERPL-SCNC: 3.7 MMOL/L (ref 3.5–5.3)
PROT SERPL-MCNC: 7 G/DL (ref 6.4–8.2)
PROT UR STRIP.AUTO-MCNC: NEGATIVE MG/DL
RBC # BLD AUTO: 4.54 X10*6/UL (ref 4.5–5.9)
RBC # UR STRIP.AUTO: ABNORMAL /UL
RBC #/AREA URNS AUTO: ABNORMAL /HPF
SODIUM SERPL-SCNC: 132 MMOL/L (ref 136–145)
SP GR UR STRIP.AUTO: 1.01
UROBILINOGEN UR STRIP.AUTO-MCNC: NORMAL MG/DL
WBC # BLD AUTO: 11.7 X10*3/UL (ref 4.4–11.3)
WBC #/AREA URNS AUTO: ABNORMAL /HPF
WBC CLUMPS #/AREA URNS AUTO: ABNORMAL /HPF

## 2024-07-03 PROCEDURE — 74176 CT ABD & PELVIS W/O CONTRAST: CPT | Mod: FOREIGN READ | Performed by: RADIOLOGY

## 2024-07-03 PROCEDURE — 83690 ASSAY OF LIPASE: CPT | Performed by: STUDENT IN AN ORGANIZED HEALTH CARE EDUCATION/TRAINING PROGRAM

## 2024-07-03 PROCEDURE — 96365 THER/PROPH/DIAG IV INF INIT: CPT

## 2024-07-03 PROCEDURE — 80053 COMPREHEN METABOLIC PANEL: CPT | Performed by: STUDENT IN AN ORGANIZED HEALTH CARE EDUCATION/TRAINING PROGRAM

## 2024-07-03 PROCEDURE — 99285 EMERGENCY DEPT VISIT HI MDM: CPT | Mod: 25

## 2024-07-03 PROCEDURE — 81001 URINALYSIS AUTO W/SCOPE: CPT | Performed by: STUDENT IN AN ORGANIZED HEALTH CARE EDUCATION/TRAINING PROGRAM

## 2024-07-03 PROCEDURE — 87086 URINE CULTURE/COLONY COUNT: CPT | Mod: STJLAB | Performed by: STUDENT IN AN ORGANIZED HEALTH CARE EDUCATION/TRAINING PROGRAM

## 2024-07-03 PROCEDURE — 83690 ASSAY OF LIPASE: CPT | Performed by: EMERGENCY MEDICINE

## 2024-07-03 PROCEDURE — 2550000001 HC RX 255 CONTRASTS: Performed by: STUDENT IN AN ORGANIZED HEALTH CARE EDUCATION/TRAINING PROGRAM

## 2024-07-03 PROCEDURE — 74176 CT ABD & PELVIS W/O CONTRAST: CPT

## 2024-07-03 PROCEDURE — 85025 COMPLETE CBC W/AUTO DIFF WBC: CPT | Performed by: EMERGENCY MEDICINE

## 2024-07-03 PROCEDURE — 85025 COMPLETE CBC W/AUTO DIFF WBC: CPT | Performed by: STUDENT IN AN ORGANIZED HEALTH CARE EDUCATION/TRAINING PROGRAM

## 2024-07-03 PROCEDURE — 36415 COLL VENOUS BLD VENIPUNCTURE: CPT | Performed by: EMERGENCY MEDICINE

## 2024-07-03 PROCEDURE — 99285 EMERGENCY DEPT VISIT HI MDM: CPT | Performed by: STUDENT IN AN ORGANIZED HEALTH CARE EDUCATION/TRAINING PROGRAM

## 2024-07-03 PROCEDURE — 80053 COMPREHEN METABOLIC PANEL: CPT | Performed by: EMERGENCY MEDICINE

## 2024-07-03 RX ORDER — CEFEPIME 1 G/50ML
2 INJECTION, SOLUTION INTRAVENOUS ONCE
Status: COMPLETED | OUTPATIENT
Start: 2024-07-03 | End: 2024-07-04

## 2024-07-03 RX ORDER — CEFTRIAXONE 1 G/50ML
1 INJECTION, SOLUTION INTRAVENOUS ONCE
Status: DISCONTINUED | OUTPATIENT
Start: 2024-07-03 | End: 2024-07-03

## 2024-07-03 RX ORDER — METRONIDAZOLE 500 MG/100ML
500 INJECTION, SOLUTION INTRAVENOUS ONCE
Status: DISCONTINUED | OUTPATIENT
Start: 2024-07-03 | End: 2024-07-08

## 2024-07-03 ASSESSMENT — PAIN SCALES - GENERAL
PAINLEVEL_OUTOF10: 0 - NO PAIN
PAINLEVEL_OUTOF10: 0 - NO PAIN

## 2024-07-03 ASSESSMENT — LIFESTYLE VARIABLES
TOTAL SCORE: 0
EVER HAD A DRINK FIRST THING IN THE MORNING TO STEADY YOUR NERVES TO GET RID OF A HANGOVER: NO
HAVE YOU EVER FELT YOU SHOULD CUT DOWN ON YOUR DRINKING: NO
EVER FELT BAD OR GUILTY ABOUT YOUR DRINKING: NO
HAVE PEOPLE ANNOYED YOU BY CRITICIZING YOUR DRINKING: NO

## 2024-07-03 ASSESSMENT — COLUMBIA-SUICIDE SEVERITY RATING SCALE - C-SSRS
2. HAVE YOU ACTUALLY HAD ANY THOUGHTS OF KILLING YOURSELF?: NO
1. IN THE PAST MONTH, HAVE YOU WISHED YOU WERE DEAD OR WISHED YOU COULD GO TO SLEEP AND NOT WAKE UP?: NO
6. HAVE YOU EVER DONE ANYTHING, STARTED TO DO ANYTHING, OR PREPARED TO DO ANYTHING TO END YOUR LIFE?: NO

## 2024-07-03 ASSESSMENT — PAIN - FUNCTIONAL ASSESSMENT: PAIN_FUNCTIONAL_ASSESSMENT: 0-10

## 2024-07-03 NOTE — ED PROVIDER NOTES
EMERGENCY DEPARTMENT ENCOUNTER      Pt Name: Nato Downs  MRN: [id number]  Birthdate 1970  Date of evaluation: 7/3/2024    HISTORY OF PRESENT ILLNESS    Nato Downs is an 53 y.o. male with history including epilepsy, encephalopathy, psychosis, mood affective disorder presenting to the emergency department for jaundice and abdominal pain.  Per the facility patient has had abdominal pain intermittently.  He denies any abdominal pain at this time but does agree that he gets pain after eating.  They were also concerned from his facility that he looked a little jaundiced and sent him in for evaluation.  Patient cannot provide much further information in regards to this.  He denies any chest pain or shortness of breath, fevers, chills.    Limitations to History: Decreased mental status  Additional History Obtained from: EMS    PAST MEDICAL HISTORY     Past Medical History:   Diagnosis Date    Auditory hallucinations     Encephalopathy     Epilepsy (Multi)     Neurogenic bladder     Neurogenic bowel     Other specified health status     No pertinent past medical history       SURGICAL HISTORY       Past Surgical History:   Procedure Laterality Date    IR  SUPRAPUBIC EXCHANGE  12/20/2023    IR  SUPRAPUBIC EXCHANGE 12/20/2023 Amilcar Souza MD [facility]    IR  SUPRAPUBIC PLACEMENT  11/21/2023    IR  SUPRAPUBIC PLACEMENT 11/21/2023 Amilcar Souza MD [facility]    OTHER SURGICAL HISTORY  12/21/2015    Laparoscopy Total Colectomy       CURRENT MEDICATIONS       Previous Medications    ACETAMINOPHEN (TYLENOL) 325 MG TABLET    Take 1 tablet (325 mg) by mouth every 6 hours if needed for mild pain (1 - 3).    ASCORBIC ACID (VITAMIN C) 500 MG ER CAPSULE    Take 1 capsule (500 mg) by mouth once daily.    CALCIUM CARBONATE-VITAMIN D3 500 MG-5 MCG (200 UNIT) TABLET    Take 1 tablet by mouth once daily.    CLONAZEPAM (KLONOPIN) 0.5 MG TABLET    Take 1 tablet (0.5 mg) by mouth 2 times a day.    DIAZEPAM  (DIASTAT ACUDIAL) 5-7.5-10 MG RECTAL KIT    Insert 10 mg into the rectum if needed for seizures. Prior to administration, review instruction sheet supplied with dose unit. Verify the ordered dose is set for administration.    LEVETIRACETAM (KEPPRA) 1,000 MG TABLET    Take 1 tablet (1,000 mg) by mouth 3 times a day.    OMEPRAZOLE OTC (PRILOSEC OTC) 20 MG EC TABLET    Take 1 tablet (20 mg) by mouth once daily in the morning. Take before meals. Do not crush, chew, or split.    OXCARBAZEPINE (TRILEPTAL) 600 MG TABLET    Take 1 tablet (600 mg) by mouth 2 times a day.    RISPERIDONE (RISPERDAL) 1 MG TABLET    Take 1 tablet (1 mg) by mouth 2 times a day.    WHEAT DEXTRIN (BENEFIBER HEALTHY SHAPE) 5 GRAM/7.4 GRAM POWDER    Take 10 mL by mouth once daily.       ALLERGIES     Zosyn [piperacillin-tazobactam]    FAMILY HISTORY     No family history on file.     SOCIAL HISTORY       Social History     Socioeconomic History    Marital status: Single     Spouse name: None    Number of children: None    Years of education: None    Highest education level: None   Occupational History    None   Tobacco Use    Smoking status: Never    Smokeless tobacco: Never   Substance and Sexual Activity    Alcohol use: Never    Drug use: Never    Sexual activity: None   Other Topics Concern    None   Social History Narrative    None     Social Determinants of Health     Financial Resource Strain: Not on file   Food Insecurity: Not on file   Transportation Needs: Not on file   Physical Activity: Not on file   Stress: Not on file   Social Connections: Not on file   Intimate Partner Violence: Not on file   Housing Stability: Not on file       PHYSICAL EXAM       ED Triage Vitals [07/03/24 1144]   Temperature Heart Rate Respirations BP   36.7 °C (98.1 °F) 84 20 124/58      Pulse Ox Temp Source Heart Rate Source Patient Position   94 % Temporal Monitor Sitting      BP Location FiO2 (%)     Right arm --       Physical Exam  Vitals and nursing note  reviewed.   Constitutional:       General: He is not in acute distress.     Appearance: He is well-developed.   HENT:      Head: Normocephalic and atraumatic.   Eyes:      Conjunctiva/sclera: Conjunctivae normal.   Cardiovascular:      Rate and Rhythm: Normal rate and regular rhythm.      Heart sounds: No murmur heard.  Pulmonary:      Effort: Pulmonary effort is normal. No respiratory distress.      Breath sounds: Normal breath sounds.   Abdominal:      Palpations: Abdomen is soft.      Tenderness: There is no abdominal tenderness. There is no guarding or rebound. Negative signs include Pendleton's sign.      Hernia: No hernia is present.      Comments: Ostomy right lower quadrant   Musculoskeletal:         General: No swelling.      Cervical back: Neck supple.   Skin:     General: Skin is warm and dry.      Capillary Refill: Capillary refill takes less than 2 seconds.   Neurological:      General: No focal deficit present.      Mental Status: He is alert and oriented to person, place, and time.   Psychiatric:         Mood and Affect: Mood normal.          DIAGNOSTIC RESULTS     LABS:  Labs Reviewed   CBC WITH AUTO DIFFERENTIAL - Abnormal       Result Value    WBC 11.7 (*)     nRBC 0.0      RBC 4.54      Hemoglobin 12.7 (*)     Hematocrit 39.7 (*)     MCV 87      MCH 28.0      MCHC 32.0      RDW 14.3      Platelets 290      Neutrophils % 78.0      Immature Granulocytes %, Automated 0.4      Lymphocytes % 7.5      Monocytes % 12.6      Eosinophils % 1.1      Basophils % 0.4      Neutrophils Absolute 9.10 (*)     Immature Granulocytes Absolute, Automated 0.05      Lymphocytes Absolute 0.88 (*)     Monocytes Absolute 1.47 (*)     Eosinophils Absolute 0.13      Basophils Absolute 0.05     COMPREHENSIVE METABOLIC PANEL - Abnormal    Glucose 119 (*)     Sodium 132 (*)     Potassium 3.7      Chloride 97 (*)     Bicarbonate 27      Anion Gap 12      Urea Nitrogen 11      Creatinine 1.15      eGFR 75      Calcium 8.5 (*)      Albumin 3.7      Alkaline Phosphatase 152 (*)     Total Protein 7.0      AST 52 (*)     Bilirubin, Total 3.3 (*)      (*)    URINALYSIS WITH REFLEX CULTURE AND MICROSCOPIC - Abnormal    Color, Urine Yellow      Appearance, Urine Clear      Specific Gravity, Urine 1.007      pH, Urine 6.0      Protein, Urine NEGATIVE      Glucose, Urine Normal      Blood, Urine 0.2 (2+) (*)     Ketones, Urine NEGATIVE      Bilirubin, Urine 0.5 (1+) (*)     Urobilinogen, Urine Normal      Nitrite, Urine 1+ (*)     Leukocyte Esterase, Urine 500 Jorge/µL (*)    MICROSCOPIC ONLY, URINE - Abnormal    WBC, Urine 21-50 (*)     WBC Clumps, Urine RARE      RBC, Urine 6-10 (*)     Bacteria, Urine 1+ (*)    LIPASE - Normal    Lipase 18      Narrative:     Venipuncture immediately after or during the administration of Metamizole may lead to falsely low results. Testing should be performed immediately prior to Metamizole dosing.   URINE CULTURE   BLOOD CULTURE   BLOOD CULTURE   URINALYSIS WITH REFLEX CULTURE AND MICROSCOPIC    Narrative:     The following orders were created for panel order Urinalysis with Reflex Culture and Microscopic.  Procedure                               Abnormality         Status                     ---------                               -----------         ------                     Urinalysis with Reflex C...[[id number]]  Abnormal            Final result               Extra Urine Gray Tube[[id number]]                            In process                   Please view results for these tests on the individual orders.   EXTRA URINE GRAY TUBE       All other labs were within normal range or not returned as of this dictation.    Imaging  CT abdomen pelvis wo IV contrast   Final Result   1.Inflammatory changes surrounding the gallbladder with   underlying stones or sludge. Correlate with ultrasound.   2.Suggestion of mild Rectal impaction.   3.Small amount pericardial fluid.   Signed by Gokul Rodriguez DO            Procedures  Procedures     EMERGENCY DEPARTMENT COURSE/MDM:   Medical Decision Making  Nato Downs is an 53 y.o. male with history including epilepsy, encephalopathy, psychosis, mood affective disorder presenting to the emergency department for jaundice and abdominal pain.  Patient cannot provide further information or history lab workup and CT imaging ordered as there concern for possible gallstone.  Patient hemodynamically stable on arrival.            ED Course as of 07/03/24 2259 Wed Jul 03, 2024 2000 Patient's lab results reviewed has a slight white count of 11.7 hemoglobin anemic consistent with baseline.  Patient has elevated AST ALT and total bilirubin above his baseline. [SK]   2258 Initially decided on giving Rocephin for the urine being positive.  CT imaging results came back and were positive for acute cholecystitis.  Because the scan was noncontrast ultrasound added on.  Patient will be admitted to medical team for continued management. [SK]      ED Course User Index  [SK] Leidy Carrasco DO        External records reviewed: recent inpatient, clinic, and prior ED notes  Labs and Diagnostic imaging independently reviewed/interpreted by me.    Patient plan, care, lab results and imaging were all discussed with attending.    ED Medications administered this visit:    Medications   cefepime (Maxipime) IV 2 g (has no administration in time range)   metroNIDAZOLE (Flagyl) 500 mg in NaCl (iso-os) 100 mL (has no administration in time range)   iohexol (OMNIPaque) 350 mg iodine/mL solution 75 mL (75 mL intravenous Given 7/3/24 1754)     New Prescriptions from this visit:    New Prescriptions    No medications on file       (Please note that portions of this note were completed with a voice recognition program.  Efforts were made to edit the dictations but occasionally words are mis-transcribed.)     Leidy Carrasco DO  Resident  07/03/24 5620

## 2024-07-03 NOTE — ED NOTES
Pt is from [facility]. Pt has not been feeling well. Pt has been weak. Pt was worked up at the facility and showed high white blood cell count and liver enzymes up. Pt here for workup     Jo Sánchez RN  07/03/24 0740

## 2024-07-04 ENCOUNTER — APPOINTMENT (OUTPATIENT)
Dept: RADIOLOGY | Facility: HOSPITAL | Age: 54
DRG: 416 | End: 2024-07-04
Payer: MEDICARE

## 2024-07-04 LAB
ALBUMIN SERPL BCP-MCNC: 3.4 G/DL (ref 3.4–5)
ALP SERPL-CCNC: 166 U/L (ref 33–120)
ALT SERPL W P-5'-P-CCNC: 114 U/L (ref 10–52)
ANION GAP SERPL CALC-SCNC: 14 MMOL/L (ref 10–20)
AST SERPL W P-5'-P-CCNC: 46 U/L (ref 9–39)
BILIRUB SERPL-MCNC: 2.9 MG/DL (ref 0–1.2)
BUN SERPL-MCNC: 11 MG/DL (ref 6–23)
CALCIUM SERPL-MCNC: 8.5 MG/DL (ref 8.6–10.3)
CHLORIDE SERPL-SCNC: 101 MMOL/L (ref 98–107)
CO2 SERPL-SCNC: 24 MMOL/L (ref 21–32)
CREAT SERPL-MCNC: 1.08 MG/DL (ref 0.5–1.3)
EGFRCR SERPLBLD CKD-EPI 2021: 81 ML/MIN/1.73M*2
ERYTHROCYTE [DISTWIDTH] IN BLOOD BY AUTOMATED COUNT: 14.4 % (ref 11.5–14.5)
GLUCOSE SERPL-MCNC: 94 MG/DL (ref 74–99)
HCT VFR BLD AUTO: 36.5 % (ref 41–52)
HGB BLD-MCNC: 11.6 G/DL (ref 13.5–17.5)
HOLD SPECIMEN: NORMAL
MAGNESIUM SERPL-MCNC: 2.06 MG/DL (ref 1.6–2.4)
MCH RBC QN AUTO: 27.4 PG (ref 26–34)
MCHC RBC AUTO-ENTMCNC: 31.8 G/DL (ref 32–36)
MCV RBC AUTO: 86 FL (ref 80–100)
NRBC BLD-RTO: 0 /100 WBCS (ref 0–0)
PLATELET # BLD AUTO: 300 X10*3/UL (ref 150–450)
POTASSIUM SERPL-SCNC: 3.9 MMOL/L (ref 3.5–5.3)
PROT SERPL-MCNC: 6.5 G/DL (ref 6.4–8.2)
RBC # BLD AUTO: 4.23 X10*6/UL (ref 4.5–5.9)
SODIUM SERPL-SCNC: 135 MMOL/L (ref 136–145)
WBC # BLD AUTO: 12.2 X10*3/UL (ref 4.4–11.3)

## 2024-07-04 PROCEDURE — C9113 INJ PANTOPRAZOLE SODIUM, VIA: HCPCS | Performed by: STUDENT IN AN ORGANIZED HEALTH CARE EDUCATION/TRAINING PROGRAM

## 2024-07-04 PROCEDURE — 99223 1ST HOSP IP/OBS HIGH 75: CPT | Performed by: STUDENT IN AN ORGANIZED HEALTH CARE EDUCATION/TRAINING PROGRAM

## 2024-07-04 PROCEDURE — 83735 ASSAY OF MAGNESIUM: CPT | Performed by: STUDENT IN AN ORGANIZED HEALTH CARE EDUCATION/TRAINING PROGRAM

## 2024-07-04 PROCEDURE — 87040 BLOOD CULTURE FOR BACTERIA: CPT | Mod: STJLAB

## 2024-07-04 PROCEDURE — G0378 HOSPITAL OBSERVATION PER HR: HCPCS

## 2024-07-04 PROCEDURE — 2500000004 HC RX 250 GENERAL PHARMACY W/ HCPCS (ALT 636 FOR OP/ED): Performed by: STUDENT IN AN ORGANIZED HEALTH CARE EDUCATION/TRAINING PROGRAM

## 2024-07-04 PROCEDURE — 2500000004 HC RX 250 GENERAL PHARMACY W/ HCPCS (ALT 636 FOR OP/ED): Mod: JZ

## 2024-07-04 PROCEDURE — 2500000001 HC RX 250 WO HCPCS SELF ADMINISTERED DRUGS (ALT 637 FOR MEDICARE OP)

## 2024-07-04 PROCEDURE — 36415 COLL VENOUS BLD VENIPUNCTURE: CPT

## 2024-07-04 PROCEDURE — 80053 COMPREHEN METABOLIC PANEL: CPT | Performed by: STUDENT IN AN ORGANIZED HEALTH CARE EDUCATION/TRAINING PROGRAM

## 2024-07-04 PROCEDURE — 96372 THER/PROPH/DIAG INJ SC/IM: CPT | Performed by: STUDENT IN AN ORGANIZED HEALTH CARE EDUCATION/TRAINING PROGRAM

## 2024-07-04 PROCEDURE — 76705 ECHO EXAM OF ABDOMEN: CPT | Performed by: RADIOLOGY

## 2024-07-04 PROCEDURE — 85027 COMPLETE CBC AUTOMATED: CPT | Performed by: STUDENT IN AN ORGANIZED HEALTH CARE EDUCATION/TRAINING PROGRAM

## 2024-07-04 PROCEDURE — 36415 COLL VENOUS BLD VENIPUNCTURE: CPT | Performed by: STUDENT IN AN ORGANIZED HEALTH CARE EDUCATION/TRAINING PROGRAM

## 2024-07-04 PROCEDURE — 76705 ECHO EXAM OF ABDOMEN: CPT

## 2024-07-04 RX ORDER — ACETAMINOPHEN 160 MG/5ML
650 SOLUTION ORAL EVERY 4 HOURS PRN
Status: DISCONTINUED | OUTPATIENT
Start: 2024-07-04 | End: 2024-07-07

## 2024-07-04 RX ORDER — CRANBERRY FRUIT 450 MG
1 TABLET ORAL EVERY MORNING
COMMUNITY

## 2024-07-04 RX ORDER — ACETAMINOPHEN 650 MG/1
650 SUPPOSITORY RECTAL EVERY 4 HOURS PRN
Status: DISCONTINUED | OUTPATIENT
Start: 2024-07-04 | End: 2024-07-07

## 2024-07-04 RX ORDER — CLONAZEPAM 0.5 MG/1
0.5 TABLET ORAL 2 TIMES DAILY
Status: DISCONTINUED | OUTPATIENT
Start: 2024-07-04 | End: 2024-07-11 | Stop reason: HOSPADM

## 2024-07-04 RX ORDER — OXYBUTYNIN CHLORIDE 10 MG/1
10 TABLET, EXTENDED RELEASE ORAL DAILY
COMMUNITY

## 2024-07-04 RX ORDER — ACETAMINOPHEN 325 MG/1
650 TABLET ORAL EVERY 4 HOURS PRN
Status: DISCONTINUED | OUTPATIENT
Start: 2024-07-04 | End: 2024-07-07

## 2024-07-04 RX ORDER — ONDANSETRON HYDROCHLORIDE 2 MG/ML
4 INJECTION, SOLUTION INTRAVENOUS EVERY 8 HOURS PRN
Status: DISCONTINUED | OUTPATIENT
Start: 2024-07-04 | End: 2024-07-11 | Stop reason: HOSPADM

## 2024-07-04 RX ORDER — CEFEPIME 1 G/50ML
1 INJECTION, SOLUTION INTRAVENOUS EVERY 8 HOURS
Status: DISCONTINUED | OUTPATIENT
Start: 2024-07-04 | End: 2024-07-08

## 2024-07-04 RX ORDER — ENOXAPARIN SODIUM 100 MG/ML
40 INJECTION SUBCUTANEOUS EVERY 12 HOURS SCHEDULED
Status: DISCONTINUED | OUTPATIENT
Start: 2024-07-04 | End: 2024-07-05

## 2024-07-04 RX ORDER — ONDANSETRON 4 MG/1
4 TABLET, ORALLY DISINTEGRATING ORAL EVERY 8 HOURS PRN
Status: DISCONTINUED | OUTPATIENT
Start: 2024-07-04 | End: 2024-07-11 | Stop reason: HOSPADM

## 2024-07-04 RX ORDER — ACETAMINOPHEN 500 MG
5000 TABLET ORAL DAILY
COMMUNITY

## 2024-07-04 RX ORDER — RISPERIDONE 0.5 MG/1
1.5 TABLET ORAL 2 TIMES DAILY
Status: DISCONTINUED | OUTPATIENT
Start: 2024-07-04 | End: 2024-07-11 | Stop reason: HOSPADM

## 2024-07-04 RX ORDER — PANTOPRAZOLE SODIUM 40 MG/1
40 TABLET, DELAYED RELEASE ORAL
Status: DISCONTINUED | OUTPATIENT
Start: 2024-07-04 | End: 2024-07-07

## 2024-07-04 RX ORDER — ASCORBIC ACID 500 MG
500 TABLET ORAL DAILY
COMMUNITY

## 2024-07-04 RX ORDER — OXCARBAZEPINE 300 MG/1
600 TABLET, FILM COATED ORAL 2 TIMES DAILY
Status: DISCONTINUED | OUTPATIENT
Start: 2024-07-04 | End: 2024-07-11 | Stop reason: HOSPADM

## 2024-07-04 RX ORDER — LEVETIRACETAM 500 MG/1
1000 TABLET ORAL 3 TIMES DAILY
Status: DISCONTINUED | OUTPATIENT
Start: 2024-07-04 | End: 2024-07-11 | Stop reason: HOSPADM

## 2024-07-04 RX ORDER — METRONIDAZOLE 500 MG/100ML
500 INJECTION, SOLUTION INTRAVENOUS EVERY 8 HOURS
Status: DISCONTINUED | OUTPATIENT
Start: 2024-07-04 | End: 2024-07-08

## 2024-07-04 RX ORDER — PANTOPRAZOLE SODIUM 40 MG/10ML
40 INJECTION, POWDER, LYOPHILIZED, FOR SOLUTION INTRAVENOUS
Status: DISCONTINUED | OUTPATIENT
Start: 2024-07-04 | End: 2024-07-07

## 2024-07-04 RX ORDER — ESOMEPRAZOLE MAGNESIUM 40 MG/1
40 GRANULE, DELAYED RELEASE ORAL
Status: DISCONTINUED | OUTPATIENT
Start: 2024-07-04 | End: 2024-07-07

## 2024-07-04 SDOH — ECONOMIC STABILITY: FOOD INSECURITY: WITHIN THE PAST 12 MONTHS, THE FOOD YOU BOUGHT JUST DIDN'T LAST AND YOU DIDN'T HAVE MONEY TO GET MORE.: NEVER TRUE

## 2024-07-04 SDOH — SOCIAL STABILITY: SOCIAL INSECURITY
WITHIN THE LAST YEAR, HAVE YOU BEEN KICKED, HIT, SLAPPED, OR OTHERWISE PHYSICALLY HURT BY YOUR PARTNER OR EX-PARTNER?: NO

## 2024-07-04 SDOH — SOCIAL STABILITY: SOCIAL NETWORK
DO YOU BELONG TO ANY CLUBS OR ORGANIZATIONS SUCH AS CHURCH GROUPS UNIONS, FRATERNAL OR ATHLETIC GROUPS, OR SCHOOL GROUPS?: YES

## 2024-07-04 SDOH — HEALTH STABILITY: PHYSICAL HEALTH
ON AVERAGE, HOW MANY DAYS PER WEEK DO YOU ENGAGE IN MODERATE TO STRENUOUS EXERCISE (LIKE A BRISK WALK)?: PATIENT UNABLE TO ANSWER

## 2024-07-04 SDOH — SOCIAL STABILITY: SOCIAL INSECURITY: DO YOU FEEL UNSAFE GOING BACK TO THE PLACE WHERE YOU ARE LIVING?: NO

## 2024-07-04 SDOH — SOCIAL STABILITY: SOCIAL NETWORK: HOW OFTEN DO YOU ATTEND CHURCH OR RELIGIOUS SERVICES?: NEVER

## 2024-07-04 SDOH — SOCIAL STABILITY: SOCIAL INSECURITY: WITHIN THE LAST YEAR, HAVE YOU BEEN AFRAID OF YOUR PARTNER OR EX-PARTNER?: NO

## 2024-07-04 SDOH — SOCIAL STABILITY: SOCIAL INSECURITY
WITHIN THE LAST YEAR, HAVE TO BEEN RAPED OR FORCED TO HAVE ANY KIND OF SEXUAL ACTIVITY BY YOUR PARTNER OR EX-PARTNER?: NO

## 2024-07-04 SDOH — SOCIAL STABILITY: SOCIAL INSECURITY: ARE THERE ANY APPARENT SIGNS OF INJURIES/BEHAVIORS THAT COULD BE RELATED TO ABUSE/NEGLECT?: NO

## 2024-07-04 SDOH — SOCIAL STABILITY: SOCIAL NETWORK: IN A TYPICAL WEEK, HOW MANY TIMES DO YOU TALK ON THE PHONE WITH FAMILY, FRIENDS, OR NEIGHBORS?: NEVER

## 2024-07-04 SDOH — SOCIAL STABILITY: SOCIAL NETWORK: ARE YOU MARRIED, WIDOWED, DIVORCED, SEPARATED, NEVER MARRIED, OR LIVING WITH A PARTNER?: NEVER MARRIED

## 2024-07-04 SDOH — ECONOMIC STABILITY: FOOD INSECURITY: WITHIN THE PAST 12 MONTHS, YOU WORRIED THAT YOUR FOOD WOULD RUN OUT BEFORE YOU GOT MONEY TO BUY MORE.: NEVER TRUE

## 2024-07-04 SDOH — SOCIAL STABILITY: SOCIAL INSECURITY: DO YOU FEEL ANYONE HAS EXPLOITED OR TAKEN ADVANTAGE OF YOU FINANCIALLY OR OF YOUR PERSONAL PROPERTY?: NO

## 2024-07-04 SDOH — ECONOMIC STABILITY: INCOME INSECURITY: IN THE LAST 12 MONTHS, WAS THERE A TIME WHEN YOU WERE NOT ABLE TO PAY THE MORTGAGE OR RENT ON TIME?: NO

## 2024-07-04 SDOH — ECONOMIC STABILITY: INCOME INSECURITY: IN THE PAST 12 MONTHS, HAS THE ELECTRIC, GAS, OIL, OR WATER COMPANY THREATENED TO SHUT OFF SERVICE IN YOUR HOME?: NO

## 2024-07-04 SDOH — SOCIAL STABILITY: SOCIAL INSECURITY: WITHIN THE LAST YEAR, HAVE YOU BEEN HUMILIATED OR EMOTIONALLY ABUSED IN OTHER WAYS BY YOUR PARTNER OR EX-PARTNER?: NO

## 2024-07-04 SDOH — HEALTH STABILITY: MENTAL HEALTH
STRESS IS WHEN SOMEONE FEELS TENSE, NERVOUS, ANXIOUS, OR CAN'T SLEEP AT NIGHT BECAUSE THEIR MIND IS TROUBLED. HOW STRESSED ARE YOU?: NOT AT ALL

## 2024-07-04 SDOH — HEALTH STABILITY: PHYSICAL HEALTH: ON AVERAGE, HOW MANY MINUTES DO YOU ENGAGE IN EXERCISE AT THIS LEVEL?: PATIENT DECLINED

## 2024-07-04 SDOH — SOCIAL STABILITY: SOCIAL INSECURITY: ARE YOU OR HAVE YOU BEEN THREATENED OR ABUSED PHYSICALLY, EMOTIONALLY, OR SEXUALLY BY ANYONE?: NO

## 2024-07-04 SDOH — SOCIAL STABILITY: SOCIAL INSECURITY: HAVE YOU HAD ANY THOUGHTS OF HARMING ANYONE ELSE?: UNABLE TO ASSESS

## 2024-07-04 SDOH — SOCIAL STABILITY: SOCIAL INSECURITY: DOES ANYONE TRY TO KEEP YOU FROM HAVING/CONTACTING OTHER FRIENDS OR DOING THINGS OUTSIDE YOUR HOME?: NO

## 2024-07-04 SDOH — SOCIAL STABILITY: SOCIAL INSECURITY: ABUSE: ADULT

## 2024-07-04 SDOH — SOCIAL STABILITY: SOCIAL NETWORK: HOW OFTEN DO YOU GET TOGETHER WITH FRIENDS OR RELATIVES?: NEVER

## 2024-07-04 SDOH — SOCIAL STABILITY: SOCIAL NETWORK: HOW OFTEN DO YOU ATTENT MEETINGS OF THE CLUB OR ORGANIZATION YOU BELONG TO?: NEVER

## 2024-07-04 SDOH — HEALTH STABILITY: MENTAL HEALTH
HOW OFTEN DO YOU NEED TO HAVE SOMEONE HELP YOU WHEN YOU READ INSTRUCTIONS, PAMPHLETS, OR OTHER WRITTEN MATERIAL FROM YOUR DOCTOR OR PHARMACY?: NEVER

## 2024-07-04 SDOH — SOCIAL STABILITY: SOCIAL INSECURITY: WERE YOU ABLE TO COMPLETE ALL THE BEHAVIORAL HEALTH SCREENINGS?: YES

## 2024-07-04 SDOH — ECONOMIC STABILITY: INCOME INSECURITY
HOW HARD IS IT FOR YOU TO PAY FOR THE VERY BASICS LIKE FOOD, HOUSING, MEDICAL CARE, AND HEATING?: PATIENT UNABLE TO ANSWER

## 2024-07-04 SDOH — SOCIAL STABILITY: SOCIAL INSECURITY: HAS ANYONE EVER THREATENED TO HURT YOUR FAMILY OR YOUR PETS?: NO

## 2024-07-04 SDOH — SOCIAL STABILITY: SOCIAL INSECURITY: HAVE YOU HAD THOUGHTS OF HARMING ANYONE ELSE?: YES

## 2024-07-04 ASSESSMENT — ENCOUNTER SYMPTOMS
ABDOMINAL DISTENTION: 0
HEADACHES: 0
COLOR CHANGE: 1
NAUSEA: 0
DIZZINESS: 0
POLYPHAGIA: 0
SORE THROAT: 0
MYALGIAS: 0
WHEEZING: 0
LIGHT-HEADEDNESS: 0
DIARRHEA: 0
SHORTNESS OF BREATH: 0
FEVER: 0
VOMITING: 0
WEAKNESS: 0
DYSPHORIC MOOD: 0
DYSURIA: 0
CHILLS: 0
VOICE CHANGE: 0
ABDOMINAL PAIN: 1
PALPITATIONS: 0

## 2024-07-04 ASSESSMENT — ACTIVITIES OF DAILY LIVING (ADL)
WALKS IN HOME: NEEDS ASSISTANCE
ADEQUATE_TO_COMPLETE_ADL: YES
TOILETING: NEEDS ASSISTANCE
PATIENT'S MEMORY ADEQUATE TO SAFELY COMPLETE DAILY ACTIVITIES?: NO
JUDGMENT_ADEQUATE_SAFELY_COMPLETE_DAILY_ACTIVITIES: YES
FEEDING YOURSELF: INDEPENDENT
HEARING - LEFT EAR: FUNCTIONAL
HEARING - RIGHT EAR: FUNCTIONAL
GROOMING: NEEDS ASSISTANCE
BATHING: NEEDS ASSISTANCE
LACK_OF_TRANSPORTATION: NO
DRESSING YOURSELF: NEEDS ASSISTANCE

## 2024-07-04 ASSESSMENT — COGNITIVE AND FUNCTIONAL STATUS - GENERAL
DRESSING REGULAR UPPER BODY CLOTHING: A LITTLE
EATING MEALS: A LITTLE
PERSONAL GROOMING: A LITTLE
MOVING TO AND FROM BED TO CHAIR: A LITTLE
DRESSING REGULAR UPPER BODY CLOTHING: A LITTLE
MOBILITY SCORE: 19
TURNING FROM BACK TO SIDE WHILE IN FLAT BAD: A LITTLE
HELP NEEDED FOR BATHING: A LITTLE
MOBILITY SCORE: 19
MOVING TO AND FROM BED TO CHAIR: A LITTLE
DAILY ACTIVITIY SCORE: 18
WALKING IN HOSPITAL ROOM: A LITTLE
CLIMB 3 TO 5 STEPS WITH RAILING: A LITTLE
EATING MEALS: A LITTLE
CLIMB 3 TO 5 STEPS WITH RAILING: A LITTLE
STANDING UP FROM CHAIR USING ARMS: A LITTLE
TOILETING: A LITTLE
DRESSING REGULAR LOWER BODY CLOTHING: A LITTLE
TOILETING: A LITTLE
PERSONAL GROOMING: A LITTLE
STANDING UP FROM CHAIR USING ARMS: A LITTLE
DAILY ACTIVITIY SCORE: 18
PATIENT BASELINE BEDBOUND: NO
WALKING IN HOSPITAL ROOM: A LITTLE
DRESSING REGULAR LOWER BODY CLOTHING: A LITTLE
TURNING FROM BACK TO SIDE WHILE IN FLAT BAD: A LITTLE
HELP NEEDED FOR BATHING: A LITTLE

## 2024-07-04 ASSESSMENT — LIFESTYLE VARIABLES
SUBSTANCE_ABUSE_PAST_12_MONTHS: NO
PRESCIPTION_ABUSE_PAST_12_MONTHS: NO
SKIP TO QUESTIONS 9-10: 1
HOW OFTEN DO YOU HAVE 6 OR MORE DRINKS ON ONE OCCASION: NEVER
AUDIT-C TOTAL SCORE: 0
HOW MANY STANDARD DRINKS CONTAINING ALCOHOL DO YOU HAVE ON A TYPICAL DAY: PATIENT DOES NOT DRINK
HOW OFTEN DO YOU HAVE A DRINK CONTAINING ALCOHOL: NEVER
AUDIT-C TOTAL SCORE: 0

## 2024-07-04 ASSESSMENT — PATIENT HEALTH QUESTIONNAIRE - PHQ9
1. LITTLE INTEREST OR PLEASURE IN DOING THINGS: NOT AT ALL
SUM OF ALL RESPONSES TO PHQ9 QUESTIONS 1 & 2: 0
2. FEELING DOWN, DEPRESSED OR HOPELESS: NOT AT ALL

## 2024-07-04 ASSESSMENT — PAIN - FUNCTIONAL ASSESSMENT: PAIN_FUNCTIONAL_ASSESSMENT: 0-10

## 2024-07-04 ASSESSMENT — PAIN SCALES - GENERAL
PAINLEVEL_OUTOF10: 0 - NO PAIN

## 2024-07-04 NOTE — NURSING NOTE
Upon walking in the room for my hourly rounding at 1700, I noticed patient had ripped off his ostomy bag. I replaced his bag and educated on the risks involved from ripping off.

## 2024-07-04 NOTE — H&P
History Of Present Illness  Nato Downs is a 53 y.o. male presenting with abdominal pain. H/o baseline encephalopathy, psychosis, mood disorder, epilepsy, auditory hallucinations, neurogenic bladder/bowel; colostomy placed over a decade ago, overarching etiology of his condition unclear from chart review but suggestive of congenital anomaly. Pt lives at long term care facility, whose staff recently noted intermittent abdominal pain and jaundice and sent him in. He is able to convey pain with eating. No other symptoms are apparent incl CP/SOB, NVD, dysuria, increased ostomy output, recent illness/exposures.    In the ED, VSS. Labs show mild leukocytosis improved from yesterday, new hyperbili, mild transaminitis. UA chronically similar to sample from a year ago. Imaging concerning for gallbladder inflammation with stones/sludge. Pt given cefepime/flagyl and admitted for acute cholecystitis.     Past Medical History  He has a past medical history of Auditory hallucinations, Encephalopathy, Epilepsy (Multi), Neurogenic bladder, Neurogenic bowel, and Other specified health status.    Surgical History  He has a past surgical history that includes Other surgical history (12/21/2015); IR  suprapubic placement (11/21/2023); and IR  suprapubic exchange (12/20/2023).     Social History  He reports that he has never smoked. He has never used smokeless tobacco. He reports that he does not drink alcohol and does not use drugs.    Family History  No family history on file.     Allergies  Zosyn [piperacillin-tazobactam]    Review of Systems   Constitutional:  Negative for chills and fever.   HENT:  Negative for ear pain, hearing loss, sore throat and voice change.    Eyes:  Negative for visual disturbance.   Respiratory:  Negative for shortness of breath and wheezing.    Cardiovascular:  Negative for chest pain and palpitations.   Gastrointestinal:  Positive for abdominal pain. Negative for abdominal distention, diarrhea,  nausea and vomiting.   Endocrine: Negative for polyphagia and polyuria.   Genitourinary:  Negative for dysuria.   Musculoskeletal:  Negative for myalgias.   Skin:  Positive for color change. Negative for rash.   Allergic/Immunologic: Negative for immunocompromised state.   Neurological:  Negative for dizziness, weakness, light-headedness and headaches.   Psychiatric/Behavioral:  Negative for dysphoric mood and suicidal ideas.    All other systems reviewed and are negative.       Physical Exam  Vitals reviewed.   Constitutional:       General: He is awake. He is not in acute distress.     Appearance: Normal appearance. He is not toxic-appearing.      Comments: Poor dentition   HENT:      Head: Normocephalic and atraumatic.      Right Ear: External ear normal.      Left Ear: External ear normal.      Nose: Nose normal.   Eyes:      Extraocular Movements: Extraocular movements intact.   Cardiovascular:      Rate and Rhythm: Normal rate and regular rhythm.   Pulmonary:      Effort: Pulmonary effort is normal. No respiratory distress.   Abdominal:      General: There is no distension.      Palpations: Abdomen is soft.      Tenderness: There is abdominal tenderness (RUQ, mild). There is no guarding.   Musculoskeletal:         General: No signs of injury.      Cervical back: Normal range of motion.   Skin:     General: Skin is warm and dry.   Neurological:      General: No focal deficit present.      Mental Status: He is alert and oriented to person, place, and time. Mental status is at baseline.   Psychiatric:         Mood and Affect: Mood normal.         Behavior: Behavior normal. Behavior is cooperative.          Last Recorded Vitals  /57   Pulse 91   Temp 36.7 °C (98.1 °F) (Temporal)   Resp 16   Wt 138 kg (304 lb)   SpO2 96%     Relevant Results        No current facility-administered medications on file prior to encounter.     Current Outpatient Medications on File Prior to Encounter   Medication Sig  Dispense Refill    acetaminophen (Tylenol) 325 mg tablet Take 1 tablet (325 mg) by mouth every 6 hours if needed for mild pain (1 - 3).      ascorbic acid (Vitamin C) 500 mg ER capsule Take 1 capsule (500 mg) by mouth once daily.      calcium carbonate-vitamin D3 500 mg-5 mcg (200 unit) tablet Take 1 tablet by mouth once daily.      clonazePAM (KlonoPIN) 0.5 mg tablet Take 1 tablet (0.5 mg) by mouth 2 times a day. 60 tablet 5    diazePAM (Diastat Acudial) 5-7.5-10 mg rectal kit Insert 10 mg into the rectum if needed for seizures. Prior to administration, review instruction sheet supplied with dose unit. Verify the ordered dose is set for administration. 1 each 5    levETIRAcetam (Keppra) 1,000 mg tablet Take 1 tablet (1,000 mg) by mouth 3 times a day.      omeprazole OTC (PriLOSEC OTC) 20 mg EC tablet Take 1 tablet (20 mg) by mouth once daily in the morning. Take before meals. Do not crush, chew, or split.      OXcarbazepine (Trileptal) 600 mg tablet Take 1 tablet (600 mg) by mouth 2 times a day.      risperiDONE (RisperDAL) 1 mg tablet Take 1 tablet (1 mg) by mouth 2 times a day.      wheat dextrin (Benefiber Healthy Shape) 5 gram/7.4 gram powder Take 10 mL by mouth once daily.       CT abdomen pelvis wo IV contrast    Result Date: 7/3/2024  STUDY: CT Abdomen and Pelvis without IV Contrast; 07/03/2024 9:07 pm INDICATION: Abdominal pain.  Elevated liver enzymes. COMPARISON: CT A/P 06/20/2022. ACCESSION NUMBER(S): [id number] ORDERING CLINICIAN: KEYANA LEI TECHNIQUE: CT of the abdomen and pelvis was performed.  Contiguous axial images were obtained at 3 mm slice thickness through the abdomen and pelvis. Coronal and sagittal reconstructions at 3 mm slice thickness were performed. No intravenous contrast was administered.  Automated mA/kV exposure control was utilized and patient examination was performed in strict accordance with principles of ALARA. FINDINGS: Please note that the evaluation of vessels, lymph  "nodes and organs is limited without intravenous contrast.  LOWER CHEST: No cardiomegaly.  There is very small amount pericardial fluid..  Lung bases reveals chronic appearing lower lobe atelectasis or scarring.  ABDOMEN:  LIVER: No hepatomegaly.  Smooth surface contour.  Normal attenuation.  BILE DUCTS: No intrahepatic or extrahepatic biliary ductal dilatation.  GALLBLADDER: Inflammatory changes are demonstrated surrounding the gallbladder. There does appear to be component of underlying stones or sludge. STOMACH: No abnormalities identified.  PANCREAS: No masses or ductal dilatation.  SPLEEN: No splenomegaly or focal splenic lesion.  ADRENAL GLANDS: No thickening or nodules.  KIDNEYS AND URETERS: Kidneys are normal in size and location.  Renal cysts are similar as compared to 2022 there is a very small 5 mm hyperdense right renal cyst..  PELVIS:  BLADDER: Suprapubic catheter is demonstrated. REPRODUCTIVE ORGANS: No abnormalities identified.  BOWEL: No abnormalities identified.  There is somewhat of a rectal impaction appearance.  VESSELS: No abnormalities identified.  Abdominal aorta is normal in caliber.  PERITONEUM/RETROPERITONEUM/LYMPH NODES: No free fluid.  No pneumoperitoneum. No lymphadenopathy.  ABDOMINAL WALL: No abnormalities identified. SOFT TISSUES: No abnormalities identified.  BONES: No acute fracture or aggressive osseous lesion.    1.Inflammatory changes surrounding the gallbladder with underlying stones or sludge. Correlate with ultrasound. 2.Suggestion of mild Rectal impaction. 3.Small amount pericardial fluid. Signed by Gokul Rodriguez DO        Results from last 7 days   Lab Units 07/03/24  1320 07/02/24 2042   WBC AUTO x10*3/uL 11.7* 17.3*   RBC AUTO x10*6/uL 4.54 4.27*   HEMOGLOBIN g/dL 12.7* 12.1*   HEMATOCRIT % 39.7* 37.0*   MCV fL 87 87   PLATELETS AUTO x10*3/uL 290 297           No lab exists for component: \"B12\"      Results from last 7 days   Lab Units 07/03/24  1320   NEUTROS ABS " x10*3/uL 9.10*   IG PCT AUTO % 0.4   BASOS ABS AUTO x10*3/uL 0.05   EOS ABS AUTO x10*3/uL 0.13   LYMPHS ABS AUTO x10*3/uL 0.88*         Results from last 7 days   Lab Units 07/03/24  1320 07/02/24  2042   SODIUM mmol/L 132* 131*   POTASSIUM mmol/L 3.7 4.1   CHLORIDE mmol/L 97* 97*   CO2 mmol/L 27 27   BUN mg/dL 11 16   CREATININE mg/dL 1.15 1.34*   CALCIUM mg/dL 8.5* 8.4*   BILIRUBIN TOTAL mg/dL 3.3*  --    ALT U/L 127*  --    AST U/L 52*  --      Results from last 7 days   Lab Units 07/03/24  1908   COLOR U  Yellow   APPEARANCE U  Clear   PH U  6.0   SPEC GRAV UR  1.007   PROTEIN U mg/dL NEGATIVE   BLOOD UR  0.2 (2+)*   NITRITE U  1+*   WBC UR /HPF 21-50*   BACTERIA UR /HPF 1+*              Assessment/Plan   Principal Problem:    Acute cholecystitis    53/M presenting with postprandial abdominal pain and jaundice admitted with acute cholecystitis.    Acute cholecystitis: Elevated bili/LFTs, jaundice, signs of gallbladder inflammation on imaging. Given cefepime/flagyl in ED due to zosyn reaction history.  - Continue cefepime/flagyl  - Obtain gallbladder US; consider surgery or GI consult pending result and symptom progress  - PPI  - q8h vitals  - AM CBC/CMP/Mg    Baseline encephalopathy, psychosis, mood disorder, epilepsy, auditory hallucinations, neurogenic bladder/bowel with colostomy and suprapubic cath, metabolic syndrome: Home meds pending med rec       DVT PPX: subcutaneous lovenox  Nutrition: NPO    Thompson Tobar, DO  [facility]/Peds  Hospital Medicine

## 2024-07-04 NOTE — NURSING NOTE
Pt arrived from ed, with sister, Afia bonilla pt alert and oriented x2-3 has mrdd, is from [employer] pt sister has taken his clothes left his shoes. Pt was up with gait belt and 1 person assist. Pt sister stated he has fallen (missed his chair while sitting)  in last 30 days, uses no devises. Skin intact, has supra pubic cath to leg bag, and ostomy on r lower quad chg bath given, has seizure pad in place, and bed alarm on,

## 2024-07-04 NOTE — CARE PLAN
"The patient's goals for the shift include pt to demonstrate comfort aeb sleeping in interval of 3 hours or greater    The clinical goals for the shift include pt \"s vitals to remain stable      "

## 2024-07-04 NOTE — CARE PLAN
"The patient's goals for the shift include pt to demonstrate comfort aeb sleeping in interval of 3 hours or greater    The clinical goals for the shift include pt \"s vitals to remain stable    Over the shift, the patient did make some progress towards his goals, pt has remained stable , has been npo , denies pain or nausea, has slept in short intervals and has remained safe this shift    "

## 2024-07-05 ENCOUNTER — APPOINTMENT (OUTPATIENT)
Dept: RADIOLOGY | Facility: HOSPITAL | Age: 54
DRG: 416 | End: 2024-07-05
Payer: MEDICARE

## 2024-07-05 LAB
ALBUMIN SERPL BCP-MCNC: 3.4 G/DL (ref 3.4–5)
ALP SERPL-CCNC: 186 U/L (ref 33–120)
ALT SERPL W P-5'-P-CCNC: 120 U/L (ref 10–52)
ANION GAP SERPL CALC-SCNC: 15 MMOL/L (ref 10–20)
AST SERPL W P-5'-P-CCNC: 51 U/L (ref 9–39)
BACTERIA UR CULT: ABNORMAL
BILIRUB SERPL-MCNC: 1.4 MG/DL (ref 0–1.2)
BUN SERPL-MCNC: 13 MG/DL (ref 6–23)
CALCIUM SERPL-MCNC: 8.8 MG/DL (ref 8.6–10.3)
CHLORIDE SERPL-SCNC: 99 MMOL/L (ref 98–107)
CO2 SERPL-SCNC: 25 MMOL/L (ref 21–32)
CREAT SERPL-MCNC: 1.03 MG/DL (ref 0.5–1.3)
EGFRCR SERPLBLD CKD-EPI 2021: 86 ML/MIN/1.73M*2
ERYTHROCYTE [DISTWIDTH] IN BLOOD BY AUTOMATED COUNT: 14.2 % (ref 11.5–14.5)
GLUCOSE SERPL-MCNC: 88 MG/DL (ref 74–99)
HCT VFR BLD AUTO: 36.7 % (ref 41–52)
HGB BLD-MCNC: 11.8 G/DL (ref 13.5–17.5)
MAGNESIUM SERPL-MCNC: 2.01 MG/DL (ref 1.6–2.4)
MCH RBC QN AUTO: 28 PG (ref 26–34)
MCHC RBC AUTO-ENTMCNC: 32.2 G/DL (ref 32–36)
MCV RBC AUTO: 87 FL (ref 80–100)
NRBC BLD-RTO: 0 /100 WBCS (ref 0–0)
PLATELET # BLD AUTO: 320 X10*3/UL (ref 150–450)
POTASSIUM SERPL-SCNC: 3.5 MMOL/L (ref 3.5–5.3)
PROT SERPL-MCNC: 6.7 G/DL (ref 6.4–8.2)
RBC # BLD AUTO: 4.22 X10*6/UL (ref 4.5–5.9)
SODIUM SERPL-SCNC: 135 MMOL/L (ref 136–145)
WBC # BLD AUTO: 9.1 X10*3/UL (ref 4.4–11.3)

## 2024-07-05 PROCEDURE — A9537 TC99M MEBROFENIN: HCPCS | Performed by: INTERNAL MEDICINE

## 2024-07-05 PROCEDURE — 83735 ASSAY OF MAGNESIUM: CPT | Performed by: STUDENT IN AN ORGANIZED HEALTH CARE EDUCATION/TRAINING PROGRAM

## 2024-07-05 PROCEDURE — 80053 COMPREHEN METABOLIC PANEL: CPT | Performed by: STUDENT IN AN ORGANIZED HEALTH CARE EDUCATION/TRAINING PROGRAM

## 2024-07-05 PROCEDURE — G0378 HOSPITAL OBSERVATION PER HR: HCPCS

## 2024-07-05 PROCEDURE — 3430000001 HC RX 343 DIAGNOSTIC RADIOPHARMACEUTICALS: Performed by: INTERNAL MEDICINE

## 2024-07-05 PROCEDURE — 96372 THER/PROPH/DIAG INJ SC/IM: CPT | Performed by: STUDENT IN AN ORGANIZED HEALTH CARE EDUCATION/TRAINING PROGRAM

## 2024-07-05 PROCEDURE — 78226 HEPATOBILIARY SYSTEM IMAGING: CPT | Performed by: STUDENT IN AN ORGANIZED HEALTH CARE EDUCATION/TRAINING PROGRAM

## 2024-07-05 PROCEDURE — C9113 INJ PANTOPRAZOLE SODIUM, VIA: HCPCS | Performed by: STUDENT IN AN ORGANIZED HEALTH CARE EDUCATION/TRAINING PROGRAM

## 2024-07-05 PROCEDURE — 85027 COMPLETE CBC AUTOMATED: CPT | Performed by: STUDENT IN AN ORGANIZED HEALTH CARE EDUCATION/TRAINING PROGRAM

## 2024-07-05 PROCEDURE — 99232 SBSQ HOSP IP/OBS MODERATE 35: CPT | Performed by: INTERNAL MEDICINE

## 2024-07-05 PROCEDURE — 2500000004 HC RX 250 GENERAL PHARMACY W/ HCPCS (ALT 636 FOR OP/ED): Performed by: STUDENT IN AN ORGANIZED HEALTH CARE EDUCATION/TRAINING PROGRAM

## 2024-07-05 PROCEDURE — 78227 HEPATOBIL SYST IMAGE W/DRUG: CPT

## 2024-07-05 PROCEDURE — 2500000004 HC RX 250 GENERAL PHARMACY W/ HCPCS (ALT 636 FOR OP/ED): Performed by: INTERNAL MEDICINE

## 2024-07-05 PROCEDURE — 36415 COLL VENOUS BLD VENIPUNCTURE: CPT | Performed by: STUDENT IN AN ORGANIZED HEALTH CARE EDUCATION/TRAINING PROGRAM

## 2024-07-05 PROCEDURE — 2500000001 HC RX 250 WO HCPCS SELF ADMINISTERED DRUGS (ALT 637 FOR MEDICARE OP)

## 2024-07-05 RX ORDER — SODIUM CHLORIDE 9 MG/ML
75 INJECTION, SOLUTION INTRAVENOUS CONTINUOUS
Status: ACTIVE | OUTPATIENT
Start: 2024-07-05 | End: 2024-07-06

## 2024-07-05 RX ORDER — KIT FOR THE PREPARATION OF TECHNETIUM TC 99M MEBROFENIN 45 MG/10ML
4.9 INJECTION, POWDER, LYOPHILIZED, FOR SOLUTION INTRAVENOUS
Status: COMPLETED | OUTPATIENT
Start: 2024-07-05 | End: 2024-07-05

## 2024-07-05 RX ORDER — ENOXAPARIN SODIUM 100 MG/ML
40 INJECTION SUBCUTANEOUS DAILY
Status: DISCONTINUED | OUTPATIENT
Start: 2024-07-06 | End: 2024-07-08

## 2024-07-05 ASSESSMENT — PAIN SCALES - GENERAL
PAINLEVEL_OUTOF10: 0 - NO PAIN
PAINLEVEL_OUTOF10: 0 - NO PAIN

## 2024-07-05 ASSESSMENT — PAIN - FUNCTIONAL ASSESSMENT
PAIN_FUNCTIONAL_ASSESSMENT: 0-10
PAIN_FUNCTIONAL_ASSESSMENT: 0-10

## 2024-07-05 NOTE — CARE PLAN
The patient's goals for the shift include pt to demonstrate comfort aeb sleeping in interval of 3 hours or greater    The clinical goals for the shift include vitals to remain stable      Problem: Skin  Goal: Decreased wound size/increased tissue granulation at next dressing change  Outcome: Progressing  Goal: Participates in plan/prevention/treatment measures  Outcome: Progressing     Problem: Fall/Injury  Goal: Not fall by end of shift  Outcome: Progressing  Goal: Be free from injury by end of the shift  Outcome: Progressing     Problem: Pain  Goal: Takes deep breaths with improved pain control throughout the shift  Outcome: Progressing  Goal: Turns in bed with improved pain control throughout the shift  Outcome: Progressing

## 2024-07-05 NOTE — PROGRESS NOTES
07/05/24 0920   Discharge Planning   Living Arrangements Other (Comment)   Support Systems Family members   Type of Residence Nursing home/residential care   Home or Post Acute Services Post acute facilities (Rehab/SNF/etc)   Type of Post Acute Facility Services Long term care   Patient expects to be discharged to: [facility]   Does the patient need discharge transport arranged? Yes   RoundTrip coordination needed? Yes     Pt admitted for acute cholecystitis. H/O seizures and encephalopathy. Pt is a long term care resident at [facility]. PCP is Amilcar Quezada. Surgical consult pending. [facility] [place]

## 2024-07-05 NOTE — SIGNIFICANT EVENT
53 YOM admitted for cholecystitis vs choledochitis vs cholelithiasis.  Seen and evaluated at bedside. Transaminitis improving, bilirubin downtrended.  Obtained RUQ US that was inconclusive. On my Exam patient had TTP RUQ but no Rebound tenderness, rigidity, or guarding.  Will continue ABX, general surgery consulted, appreciate recs. HIDA scan pending, along with MRCP based radiology recommendation.  NPO after MN in case invasive intervention is warranted.

## 2024-07-05 NOTE — CARE PLAN
The patient's goals for the shift include pt to demonstrate comfort aeb sleeping in interval of 3 hours or greater    The clinical goals for the shift include vitals to remain stable    Over the shift, the patient did make progress towards goals this shift, has reposition self in bed throughout this shift,, ambulated in hallway with 1 person assist,  pt has denied pain or discomfort , no nausea or emesis tolerated clear diet , was npo since midnight , pt is able to make needs know, using ncl as needed and has remained safe this shift

## 2024-07-05 NOTE — PROGRESS NOTES
"Nato Downs is a 53 y.o. male on day 0 of admission presenting with Acute cholecystitis.    Subjective   Assuming care of this patient today, long-term resident at [place], here with transaminase and Tbili elevation, stone on imaging, no CBD dilation; had HIDA and MRCP today, pending surgery evaluation.    Objective     Physical Exam  Vitals reviewed.   Constitutional:       General: He is awake. He is not in acute distress.     Appearance: Normal appearance. He is not toxic-appearing.      Comments: Poor dentition   HENT:      Head: Normocephalic and atraumatic.      Right Ear: External ear normal.      Left Ear: External ear normal.      Nose: Nose normal.   Eyes:      Extraocular Movements: Extraocular movements intact.   Cardiovascular:      Rate and Rhythm: Normal rate and regular rhythm.   Pulmonary:      Effort: Pulmonary effort is normal. No respiratory distress.   Abdominal:      General: There is no distension.      Palpations: Abdomen is soft.      Tenderness: There is abdominal tenderness (RUQ, mild) although without rebound or guarding. He mentions some mild LLQ pain with palpation today as well, although with deep palpation.   Musculoskeletal:         General: No signs of injury.      Cervical back: Normal range of motion.   Skin:     General: Skin is warm and dry.   Neurological:      General: No focal deficit present.      Mental Status: He is alert and oriented to person, place, and time. Mental status is at baseline.   Psychiatric:         Mood and Affect: Mood normal.         Behavior: Behavior normal. Behavior is cooperative.     Last Recorded Vitals  Blood pressure 136/82, pulse 80, temperature 35.9 °C (96.6 °F), temperature source Temporal, resp. rate 16, height 1.95 m (6' 4.77\"), weight 131 kg (289 lb 11 oz), SpO2 92%.  Intake/Output last 3 Shifts:  I/O last 3 completed shifts:  In: 770 (5.9 mL/kg) [P.O.:120; IV Piggyback:650]  Out: 3350 (25.5 mL/kg) [Urine:1050 (0.2 mL/kg/hr); " Drains:2150; Stool:150]  Weight: 131.4 kg     Relevant Results  Scheduled medications  cefepime, 1 g, intravenous, q8h  clonazePAM, 0.5 mg, oral, BID  [START ON 7/6/2024] enoxaparin, 40 mg, subcutaneous, Daily  pantoprazole, 40 mg, oral, Daily before breakfast   Or  esomeprazole, 40 mg, nasoduodenal tube, Daily before breakfast   Or  pantoprazole, 40 mg, intravenous, Daily before breakfast  levETIRAcetam, 1,000 mg, oral, TID  metroNIDAZOLE, 500 mg, intravenous, Once  metroNIDAZOLE, 500 mg, intravenous, q8h  OXcarbazepine, 600 mg, oral, BID  risperiDONE, 1.5 mg, oral, BID      Continuous medications     PRN medications  PRN medications: acetaminophen **OR** acetaminophen **OR** acetaminophen, acetaminophen **OR** acetaminophen **OR** acetaminophen, ondansetron ODT **OR** ondansetron    Results for orders placed or performed during the hospital encounter of 07/03/24 (from the past 24 hour(s))   Magnesium   Result Value Ref Range    Magnesium 2.01 1.60 - 2.40 mg/dL   CBC   Result Value Ref Range    WBC 9.1 4.4 - 11.3 x10*3/uL    nRBC 0.0 0.0 - 0.0 /100 WBCs    RBC 4.22 (L) 4.50 - 5.90 x10*6/uL    Hemoglobin 11.8 (L) 13.5 - 17.5 g/dL    Hematocrit 36.7 (L) 41.0 - 52.0 %    MCV 87 80 - 100 fL    MCH 28.0 26.0 - 34.0 pg    MCHC 32.2 32.0 - 36.0 g/dL    RDW 14.2 11.5 - 14.5 %    Platelets 320 150 - 450 x10*3/uL   Comprehensive metabolic panel   Result Value Ref Range    Glucose 88 74 - 99 mg/dL    Sodium 135 (L) 136 - 145 mmol/L    Potassium 3.5 3.5 - 5.3 mmol/L    Chloride 99 98 - 107 mmol/L    Bicarbonate 25 21 - 32 mmol/L    Anion Gap 15 10 - 20 mmol/L    Urea Nitrogen 13 6 - 23 mg/dL    Creatinine 1.03 0.50 - 1.30 mg/dL    eGFR 86 >60 mL/min/1.73m*2    Calcium 8.8 8.6 - 10.3 mg/dL    Albumin 3.4 3.4 - 5.0 g/dL    Alkaline Phosphatase 186 (H) 33 - 120 U/L    Total Protein 6.7 6.4 - 8.2 g/dL    AST 51 (H) 9 - 39 U/L    Bilirubin, Total 1.4 (H) 0.0 - 1.2 mg/dL     (H) 10 - 52 U/L     US gallbladder    Result Date:  7/4/2024  Interpreted By:  Elías Lucero, STUDY: US GALLBLADDER;  7/4/2024 3:02 pm   INDICATION: Signs/Symptoms:Evaluation of cholecystitis.   COMPARISON: None.   ACCESSION NUMBER(S): [id number]   ORDERING CLINICIAN: ELVER CRAFT   TECHNIQUE: Multiple images of the right upper quadrant were obtained.   FINDINGS: The study is limited due to patient's postprandial status and inability to hold his breath.   Visualized liver parenchyma shows increased echogenicity. There are multiple stones in the gallbladder. Gallbladder wall thickness could not be accurately measured due to incomplete distention no pericholecystic fluid is noted. Sonographic Pendleton's sign has been reported as negative. Common bile duct measures 0.7 cm in diameter. The pancreas is obscured by the overlying bowel gas. Visualized portal vein and right kidney are unremarkable. There is no free fluid in the Moreno's pouch.       Limited study.   Cholelithiasis. HIDA scan may be obtained if clinical suspicion for acute cholecystitis is high.   Minimal CBD dilatation, which may be age-appropriate. However, MRCP is recommended to rule out distal choledocholithiasis if clinically indicated.   Diffuse hepatic steatosis   Signed by: Elías Lucero 7/4/2024 4:43 PM Dictation workstation:   [id number]    CT abdomen pelvis wo IV contrast    Result Date: 7/3/2024  STUDY: CT Abdomen and Pelvis without IV Contrast; 07/03/2024 9:07 pm INDICATION: Abdominal pain.  Elevated liver enzymes. COMPARISON: CT A/P 06/20/2022. ACCESSION NUMBER(S): [id number] ORDERING CLINICIAN: KEYANA LEI TECHNIQUE: CT of the abdomen and pelvis was performed.  Contiguous axial images were obtained at 3 mm slice thickness through the abdomen and pelvis. Coronal and sagittal reconstructions at 3 mm slice thickness were performed. No intravenous contrast was administered.  Automated mA/kV exposure control was utilized and patient examination was performed in strict accordance with  principles of ALARA. FINDINGS: Please note that the evaluation of vessels, lymph nodes and organs is limited without intravenous contrast.  LOWER CHEST: No cardiomegaly.  There is very small amount pericardial fluid..  Lung bases reveals chronic appearing lower lobe atelectasis or scarring.  ABDOMEN:  LIVER: No hepatomegaly.  Smooth surface contour.  Normal attenuation.  BILE DUCTS: No intrahepatic or extrahepatic biliary ductal dilatation.  GALLBLADDER: Inflammatory changes are demonstrated surrounding the gallbladder. There does appear to be component of underlying stones or sludge. STOMACH: No abnormalities identified.  PANCREAS: No masses or ductal dilatation.  SPLEEN: No splenomegaly or focal splenic lesion.  ADRENAL GLANDS: No thickening or nodules.  KIDNEYS AND URETERS: Kidneys are normal in size and location.  Renal cysts are similar as compared to 2022 there is a very small 5 mm hyperdense right renal cyst..  PELVIS:  BLADDER: Suprapubic catheter is demonstrated. REPRODUCTIVE ORGANS: No abnormalities identified.  BOWEL: No abnormalities identified.  There is somewhat of a rectal impaction appearance.  VESSELS: No abnormalities identified.  Abdominal aorta is normal in caliber.  PERITONEUM/RETROPERITONEUM/LYMPH NODES: No free fluid.  No pneumoperitoneum. No lymphadenopathy.  ABDOMINAL WALL: No abnormalities identified. SOFT TISSUES: No abnormalities identified.  BONES: No acute fracture or aggressive osseous lesion.    1.Inflammatory changes surrounding the gallbladder with underlying stones or sludge. Correlate with ultrasound. 2.Suggestion of mild Rectal impaction. 3.Small amount pericardial fluid. Signed by Gokul Rodriguez DO         Assessment/Plan   Principal Problem:    Acute cholecystitis   vs choledochitis vs cholelithiasis     History of psychosis, mood disorder  History epilepsy  History of auditory hallucinations  History of neurogenic bladder  History of colostomy    Plan:  - Patient stable to  remain at inpatient  - HIDA scan official read not back, although no activity in the gallbladder per radiologist, Dr. Arroyo  - Continue antibiotics as his  - Patient can have clear liquids, will make n.p.o. after midnight  - Awaiting general surgery consultation, will also add GI as well for potential ERCP  - Will trend metabolic panel including hepatic functions  - Plan of care will be discussed with patient's family    Enoxaparin for VTE prophylaxis, will hold tomorrow morning's dose (last given dose 7/5/2024 at 9 AM)  Code: Full           I spent 45 minutes in the professional and overall care of this patient.    Lake Means MD  [facility]  Internal Medicine    This document was generated in whole or in part using the Dragon One medical voice recognition software and there may be some incorrect words/wording, spelling, or punctuation errors that were not corrected prior to finalization in the medical record.

## 2024-07-06 LAB
ALBUMIN SERPL BCP-MCNC: 3.4 G/DL (ref 3.4–5)
ALP SERPL-CCNC: 172 U/L (ref 33–120)
ALT SERPL W P-5'-P-CCNC: 127 U/L (ref 10–52)
ANION GAP SERPL CALC-SCNC: 15 MMOL/L (ref 10–20)
AST SERPL W P-5'-P-CCNC: 69 U/L (ref 9–39)
BILIRUB SERPL-MCNC: 0.9 MG/DL (ref 0–1.2)
BUN SERPL-MCNC: 13 MG/DL (ref 6–23)
CALCIUM SERPL-MCNC: 8.5 MG/DL (ref 8.6–10.3)
CHLORIDE SERPL-SCNC: 100 MMOL/L (ref 98–107)
CO2 SERPL-SCNC: 23 MMOL/L (ref 21–32)
CREAT SERPL-MCNC: 0.93 MG/DL (ref 0.5–1.3)
EGFRCR SERPLBLD CKD-EPI 2021: >90 ML/MIN/1.73M*2
ERYTHROCYTE [DISTWIDTH] IN BLOOD BY AUTOMATED COUNT: 14.4 % (ref 11.5–14.5)
GLUCOSE SERPL-MCNC: 99 MG/DL (ref 74–99)
HCT VFR BLD AUTO: 33 % (ref 41–52)
HGB BLD-MCNC: 10.3 G/DL (ref 13.5–17.5)
MAGNESIUM SERPL-MCNC: 2.17 MG/DL (ref 1.6–2.4)
MCH RBC QN AUTO: 27.9 PG (ref 26–34)
MCHC RBC AUTO-ENTMCNC: 31.2 G/DL (ref 32–36)
MCV RBC AUTO: 89 FL (ref 80–100)
NRBC BLD-RTO: 0 /100 WBCS (ref 0–0)
PLATELET # BLD AUTO: 520 X10*3/UL (ref 150–450)
POTASSIUM SERPL-SCNC: 4.3 MMOL/L (ref 3.5–5.3)
PROT SERPL-MCNC: 6.6 G/DL (ref 6.4–8.2)
RBC # BLD AUTO: 3.69 X10*6/UL (ref 4.5–5.9)
SODIUM SERPL-SCNC: 134 MMOL/L (ref 136–145)
WBC # BLD AUTO: 8.6 X10*3/UL (ref 4.4–11.3)

## 2024-07-06 PROCEDURE — 80053 COMPREHEN METABOLIC PANEL: CPT | Performed by: STUDENT IN AN ORGANIZED HEALTH CARE EDUCATION/TRAINING PROGRAM

## 2024-07-06 PROCEDURE — 99223 1ST HOSP IP/OBS HIGH 75: CPT | Performed by: SURGERY

## 2024-07-06 PROCEDURE — 2500000001 HC RX 250 WO HCPCS SELF ADMINISTERED DRUGS (ALT 637 FOR MEDICARE OP)

## 2024-07-06 PROCEDURE — 99232 SBSQ HOSP IP/OBS MODERATE 35: CPT | Performed by: INTERNAL MEDICINE

## 2024-07-06 PROCEDURE — 36415 COLL VENOUS BLD VENIPUNCTURE: CPT | Performed by: STUDENT IN AN ORGANIZED HEALTH CARE EDUCATION/TRAINING PROGRAM

## 2024-07-06 PROCEDURE — 2500000004 HC RX 250 GENERAL PHARMACY W/ HCPCS (ALT 636 FOR OP/ED): Performed by: STUDENT IN AN ORGANIZED HEALTH CARE EDUCATION/TRAINING PROGRAM

## 2024-07-06 PROCEDURE — 85027 COMPLETE CBC AUTOMATED: CPT | Performed by: STUDENT IN AN ORGANIZED HEALTH CARE EDUCATION/TRAINING PROGRAM

## 2024-07-06 PROCEDURE — 83735 ASSAY OF MAGNESIUM: CPT | Performed by: STUDENT IN AN ORGANIZED HEALTH CARE EDUCATION/TRAINING PROGRAM

## 2024-07-06 PROCEDURE — 2500000004 HC RX 250 GENERAL PHARMACY W/ HCPCS (ALT 636 FOR OP/ED): Performed by: INTERNAL MEDICINE

## 2024-07-06 PROCEDURE — 2500000001 HC RX 250 WO HCPCS SELF ADMINISTERED DRUGS (ALT 637 FOR MEDICARE OP): Performed by: STUDENT IN AN ORGANIZED HEALTH CARE EDUCATION/TRAINING PROGRAM

## 2024-07-06 PROCEDURE — 1100000001 HC PRIVATE ROOM DAILY

## 2024-07-06 PROCEDURE — 99222 1ST HOSP IP/OBS MODERATE 55: CPT | Performed by: PHYSICIAN ASSISTANT

## 2024-07-06 RX ORDER — LORAZEPAM 2 MG/ML
1 INJECTION INTRAMUSCULAR ONCE
Status: DISCONTINUED | OUTPATIENT
Start: 2024-07-06 | End: 2024-07-06

## 2024-07-06 RX ORDER — SODIUM CHLORIDE 9 MG/ML
75 INJECTION, SOLUTION INTRAVENOUS CONTINUOUS
Status: ACTIVE | OUTPATIENT
Start: 2024-07-06 | End: 2024-07-07

## 2024-07-06 RX ORDER — HEPARIN SODIUM 5000 [USP'U]/ML
5000 INJECTION, SOLUTION INTRAVENOUS; SUBCUTANEOUS
Status: COMPLETED | OUTPATIENT
Start: 2024-07-06 | End: 2024-07-07

## 2024-07-06 ASSESSMENT — COGNITIVE AND FUNCTIONAL STATUS - GENERAL
STANDING UP FROM CHAIR USING ARMS: A LITTLE
PERSONAL GROOMING: A LITTLE
MOBILITY SCORE: 19
DRESSING REGULAR LOWER BODY CLOTHING: A LITTLE
TOILETING: A LITTLE
TURNING FROM BACK TO SIDE WHILE IN FLAT BAD: A LITTLE
DAILY ACTIVITIY SCORE: 19
HELP NEEDED FOR BATHING: A LITTLE
CLIMB 3 TO 5 STEPS WITH RAILING: A LITTLE
DRESSING REGULAR UPPER BODY CLOTHING: A LITTLE
MOVING TO AND FROM BED TO CHAIR: A LITTLE
WALKING IN HOSPITAL ROOM: A LITTLE

## 2024-07-06 ASSESSMENT — ENCOUNTER SYMPTOMS
HEADACHES: 0
CONFUSION: 0
DYSURIA: 0
COUGH: 0
HEMATURIA: 0
FEVER: 0
DIZZINESS: 0
MYALGIAS: 0
CHILLS: 0
SHORTNESS OF BREATH: 1
APPETITE CHANGE: 0
WEAKNESS: 0
ARTHRALGIAS: 0
SORE THROAT: 0
WHEEZING: 0
EYE PAIN: 0
NUMBNESS: 0
UNEXPECTED WEIGHT CHANGE: 0
JOINT SWELLING: 0
TROUBLE SWALLOWING: 0

## 2024-07-06 ASSESSMENT — PAIN SCALES - GENERAL
PAINLEVEL_OUTOF10: 0 - NO PAIN
PAINLEVEL_OUTOF10: 0 - NO PAIN

## 2024-07-06 ASSESSMENT — PAIN - FUNCTIONAL ASSESSMENT
PAIN_FUNCTIONAL_ASSESSMENT: 0-10
PAIN_FUNCTIONAL_ASSESSMENT: 0-10

## 2024-07-06 NOTE — CARE PLAN
The patient's goals for the shift include get tests done    The clinical goals for the shift include pain control    No further testing done for pt today. Consultation completed per dr. Sapp at bedside with pt and sister (POA). Plan is for surgery tomorrow. Pt's room moved to room 4108 per house convenience per status of pt changing to inpatient. Pt has had no complaints of pain today and was advanced to clear liquid diet and has tolerated well after taking all of tray.

## 2024-07-06 NOTE — PROGRESS NOTES
Outpatient Physical Therapy Ortho Treatment Note  [facility]     Patient Name: Curtis Garsia  : 1995  MRN: [id number]  Today's Date: 3/15/2022      Visit Date: 03/15/2022    Attendance: 9 visits  Subjective improvement: 25-30% improvement  MD appt: PRN  Recheck due: 3/24/2022    Visit Dx:    ICD-10-CM ICD-9-CM   1. Hydrocephalus, unspecified type (HCC)  G91.9 331.4   2. Dizziness  R42 780.4   3. Poor balance  R26.89 781.99       Patient Active Problem List   Diagnosis   • Hydrocephalus (HCC)   • Palpitations   • S/P  shunt Dr Charly Li 2021   • JOSHI (dyspnea on exertion)        Past Medical History:   Diagnosis Date   • Anxiety    • Asthma     AS A CHILD    • Balance disorder    • Chiari I malformation (HCC)    • Cholelithiasis         Past Surgical History:   Procedure Laterality Date   • NERVE DECOMPRESSION     • SHUNT REVISION          PT Ortho     Row Name 03/15/22 1600       Subjective Comments    Subjective Comments pt states that his dizziness is mild today. States that he has been having some anxiety the past couple of days.  -KM       Precautions and Contraindications    Precautions Dizziness, R ventricle shunt placement, hx of chiari decompression  -KM       Subjective Pain    Able to rate subjective pain? yes  -KM    Pre-Treatment Pain Level 5  -KM    Post-Treatment Pain Level 7  -KM       Posture/Observations    Posture/Observations Comments Improving posture  -KM          User Key  (r) = Recorded By, (t) = Taken By, (c) = Cosigned By    Initials Name Provider Type     Yoselin Livingston PTA Physical Therapy Assistant                             PT Assessment/Plan     Row Name 03/15/22 1600          PT Assessment    Assessment Comments pt did well with treatment. Able to navigate higher level balance course with no issues and no LOB occurances. Pt displayed difficulty with intro into QP and did have some mild discomfort in the neck with QP  "N[place]s is a 53 y.o. male on day 0 of admission presenting with Acute cholecystitis.    Subjective   Patient is pleasant, he is laying in bed and does not have any acute complaints today, discussed the case with Dr. Sapp who will review images and evaluate the patient a little bit later today.  Overnight, patient was taken for MRCP but not able to handle the study given feelings of anxiousness and claustrophobia.    Objective     Physical Exam  Vitals reviewed.   Constitutional:       General: He is awake. He is not in acute distress.     Appearance: Normal appearance. He is not toxic-appearing.      Comments: Poor dentition   HENT:      Head: Normocephalic and atraumatic.      Right Ear: External ear normal.      Left Ear: External ear normal.      Nose: Nose normal.   Eyes:      Extraocular Movements: Extraocular movements intact.   Cardiovascular:      Rate and Rhythm: Normal rate and regular rhythm.   Pulmonary:      Effort: Pulmonary effort is normal. No respiratory distress.   Abdominal:      General: There is no distension.      Palpations: Abdomen is soft.      Tenderness: There is right upper quadrant tenderness on deep palpation.  There is a colostomy in the right lower quadrant with soft bile colored liquid in the basin  Musculoskeletal:         General: No signs of injury.      Cervical back: Normal range of motion.   Skin:     General: Skin is warm and dry.   Neurological:      General: No focal deficit present.      Mental Status: He is alert and oriented to person, place, and time. Mental status is at baseline.   Psychiatric:         Mood and Affect: Mood normal.         Behavior: Behavior normal. Behavior is cooperative.     Last Recorded Vitals  Blood pressure 134/75, pulse 85, temperature 36.5 °C (97.7 °F), temperature source Temporal, resp. rate 18, height 1.95 m (6' 4.77\"), weight 131 kg (289 lb 11 oz), SpO2 94%.  Intake/Output last 3 Shifts:  I/O last 3 completed shifts:  In: 1337.5 " (10.2 mL/kg) [P.O.:120; I.V.:767.5 (5.8 mL/kg); IV Piggyback:450]  Out: 4125 (31.4 mL/kg) [Drains:3350; Stool:775]  Weight: 131.4 kg     Relevant Results  Scheduled medications  cefepime, 1 g, intravenous, q8h  clonazePAM, 0.5 mg, oral, BID  [Held by provider] enoxaparin, 40 mg, subcutaneous, Daily  pantoprazole, 40 mg, oral, Daily before breakfast   Or  esomeprazole, 40 mg, nasoduodenal tube, Daily before breakfast   Or  pantoprazole, 40 mg, intravenous, Daily before breakfast  levETIRAcetam, 1,000 mg, oral, TID  metroNIDAZOLE, 500 mg, intravenous, Once  metroNIDAZOLE, 500 mg, intravenous, q8h  OXcarbazepine, 600 mg, oral, BID  risperiDONE, 1.5 mg, oral, BID      Continuous medications  sodium chloride 0.9%, 75 mL/hr, Last Rate: 75 mL/hr (07/06/24 0521)      PRN medications  PRN medications: acetaminophen **OR** acetaminophen **OR** acetaminophen, acetaminophen **OR** acetaminophen **OR** acetaminophen, ondansetron ODT **OR** ondansetron    Results for orders placed or performed during the hospital encounter of 07/03/24 (from the past 24 hour(s))   Magnesium   Result Value Ref Range    Magnesium 2.17 1.60 - 2.40 mg/dL   CBC   Result Value Ref Range    WBC 8.6 4.4 - 11.3 x10*3/uL    nRBC 0.0 0.0 - 0.0 /100 WBCs    RBC 3.69 (L) 4.50 - 5.90 x10*6/uL    Hemoglobin 10.3 (L) 13.5 - 17.5 g/dL    Hematocrit 33.0 (L) 41.0 - 52.0 %    MCV 89 80 - 100 fL    MCH 27.9 26.0 - 34.0 pg    MCHC 31.2 (L) 32.0 - 36.0 g/dL    RDW 14.4 11.5 - 14.5 %    Platelets 520 (H) 150 - 450 x10*3/uL   Comprehensive metabolic panel   Result Value Ref Range    Glucose 99 74 - 99 mg/dL    Sodium 134 (L) 136 - 145 mmol/L    Potassium 4.3 3.5 - 5.3 mmol/L    Chloride 100 98 - 107 mmol/L    Bicarbonate 23 21 - 32 mmol/L    Anion Gap 15 10 - 20 mmol/L    Urea Nitrogen 13 6 - 23 mg/dL    Creatinine 0.93 0.50 - 1.30 mg/dL    eGFR >90 >60 mL/min/1.73m*2    Calcium 8.5 (L) 8.6 - 10.3 mg/dL    Albumin 3.4 3.4 - 5.0 g/dL    Alkaline Phosphatase 172 (H) 33 -  activities.  -KM            PT Plan    PT Frequency 1x/week  -KM     PT Plan Comments Dropping down to 1x per week. Cont working on Mophie [employer]yla. Try [employer] core stab.  -KM           User Key  (r) = Recorded By, (t) = Taken By, (c) = Cosigned By    Initials Name Provider Type    Yoselin Domínguez PTA Physical Therapy Assistant                   OP Exercises     Row Name 03/15/22 1600             Subjective Comments    Subjective Comments pt states that his dizziness is mild today. States that he has been having some anxiety the past couple of days.  -KM              Subjective Pain    Able to rate subjective pain? yes  -KM      Pre-Treatment Pain Level 5  -KM      Post-Treatment Pain Level 7  -KM              Exercise 1    Exercise Name 1 PROII for strength/endurance  -KM      Time 1 8 min  -KM      Additional Comments L4.0  -KM              Exercise 2    Exercise Name 2 Scalene stretch L  -KM      Reps 2 1  -KM      Time 2 30 sec hold  -KM              Exercise 3    Exercise Name 3 Doorway pec stretch  -KM      Reps 3 2  -KM      Time 3 30 sec hold  -KM              Exercise 4    Exercise Name 4 BOSU fwd step up  -KM      Additional Comments bilateral  -KM              Exercise 5    Exercise Name 5 BOSU lateral step up and over  -KM      Additional Comments bilateral  -KM              Exercise 6    Exercise Name 6 Reverse BOSU FA balance  -KM      Reps 6 2  -KM      Time 6 30 sec hold  -KM      Additional Comments no UE assist  -KM              Exercise 7    Exercise Name 7 Reverse BOSU mini squats  -KM      Sets 7 2  -KM      Reps 7 10  -KM              Exercise 8    Exercise Name 8 Cybex: LP2  -KM      Sets 8 2  -KM      Reps 8 10  -KM      Additional Comments 70#  -KM              Exercise 9    Exercise Name 9 Balance course: BOSU, hurdles, uneven mat, wedges  -KM      Sets 9 1  -KM      Reps 9 3 laps  -KM              Exercise 10    Exercise Name 10 CC: resisted gait 4 way  -KM      Sets 10 1  -KM       Reps 10 5 laps ea direction  -KM      Additional Comments 2 pl  -KM              Exercise 11    Exercise Name 11 QP alt UE  -KM      Sets 11 1  -KM      Reps 11 10  -KM              Exercise 12    Exercise Name 12 QP alt LE  -KM      Reps 12 1x10  -KM            User Key  (r) = Recorded By, (t) = Taken By, (c) = Cosigned By    Initials Name Provider Type    Yoselin Domínguez, PTA Physical Therapy Assistant                              PT OP Goals     Row Name 03/15/22 1600          PT Short Term Goals    STG Date to Achieve 03/09/22  -KM     STG 1 Demonstrate independence/compliance in performance of initial HEP  -KM     STG 1 Progress Met  -KM     STG 2 Improved Dynamic Gait Index to 17/24 to demonstrate improved safey and dynamic balance with functional mobility  -KM     STG 2 Progress Met  -KM     STG 3 Demonstrate improved bilateral ankle DF/PF MMT to 5/5  -KM     STG 3 Progress Met  -KM     STG 4 Perform tandem gait on level ground for 20 ft with no LOB, UE assist, and improve LE control  -KM     STG 4 Progress Met  -KM     STG 5 Demonstrate improved bilateral hip MMT to 4+/5 or greater  -KM     STG 5 Progress Met  -KM            Long Term Goals    LTG Date to Achieve 04/06/22  -KM     LTG 1 Subjectively report 50% overall improvement or greater  -KM     LTG 1 Progress Ongoing  -KM     LTG 2 Improved Dynamic Gait Index to 19/24 to demonstrate improved safey and dynamic balance with functional mobility and decrease fall risk  -KM     LTG 2 Progress Met  -KM     LTG 3 Demonstrate improved bilateral hip MMT to 5/5 in all planes  -KM     LTG 3 Progress Ongoing  -KM     LTG 4 Perform balance/obstacle course consisting of hurdles, BOSU, unlevel/compliant surface, & wedges for incline with no LOB, good overall LE control to demonstrate improved dynamic balance with navigation outside of home  -KM     LTG 4 Progress Ongoing  -KM     LTG 5 Perform airex FA/EO balance with UE dual task activities (mc  120 U/L    Total Protein 6.6 6.4 - 8.2 g/dL    AST 69 (H) 9 - 39 U/L    Bilirubin, Total 0.9 0.0 - 1.2 mg/dL     (H) 10 - 52 U/L     US gallbladder    Result Date: 7/4/2024  Interpreted By:  Elías Lucero, STUDY: US GALLBLADDER;  7/4/2024 3:02 pm   INDICATION: Signs/Symptoms:Evaluation of cholecystitis.   COMPARISON: None.   ACCESSION NUMBER(S): [id number]   ORDERING CLINICIAN: ELVER CRAFT   TECHNIQUE: Multiple images of the right upper quadrant were obtained.   FINDINGS: The study is limited due to patient's postprandial status and inability to hold his breath.   Visualized liver parenchyma shows increased echogenicity. There are multiple stones in the gallbladder. Gallbladder wall thickness could not be accurately measured due to incomplete distention no pericholecystic fluid is noted. Sonographic Pendleton's sign has been reported as negative. Common bile duct measures 0.7 cm in diameter. The pancreas is obscured by the overlying bowel gas. Visualized portal vein and right kidney are unremarkable. There is no free fluid in the Moreno's pouch.       Limited study.   Cholelithiasis. HIDA scan may be obtained if clinical suspicion for acute cholecystitis is high.   Minimal CBD dilatation, which may be age-appropriate. However, MRCP is recommended to rule out distal choledocholithiasis if clinically indicated.   Diffuse hepatic steatosis   Signed by: Elías Lucero 7/4/2024 4:43 PM Dictation workstation:   [id number]    CT abdomen pelvis wo IV contrast    Result Date: 7/3/2024  STUDY: CT Abdomen and Pelvis without IV Contrast; 07/03/2024 9:07 pm INDICATION: Abdominal pain.  Elevated liver enzymes. COMPARISON: CT A/P 06/20/2022. ACCESSION NUMBER(S): [id number] ORDERING CLINICIAN: KEYANA LEI TECHNIQUE: CT of the abdomen and pelvis was performed.  Contiguous axial images were obtained at 3 mm slice thickness through the abdomen and pelvis. Coronal and sagittal reconstructions at 3 mm slice thickness  press, rows, ball toss, etc) with improved LE control, no LOB  -KM     LTG 5 Progress Partially Met;Progressing  -KM     LTG 6 Demonstrate ability to lift/carry lightly weighted crate from floor>waist, good lifting mechanics, without LOB  -KM     LTG 6 Progress Ongoing  -KM            Time Calculation    PT Goal Re-Cert Due Date 03/24/22  -KM           User Key  (r) = Recorded By, (t) = Taken By, (c) = Cosigned By    Initials Name Provider Type    Yoselin Domínguez PTA Physical Therapy Assistant                Therapy Education  Given: HEP, Symptoms/condition management, Pain management, Posture/body mechanics, Fall prevention and home safety  Program: Reinforced, Progressed  How Provided: Verbal, Demonstration, Written  Provided to: Patient  Level of Understanding: Teach back education performed, Verbalized, Demonstrated              Time Calculation:   Start Time: 1601  Stop Time: 1702  Time Calculation (min): 61 min  Therapy Charges for Today     Code Description Service Date Service Provider Modifiers Qty    71934963683 HC PT THER PROC EA 15 MIN 3/15/2022 Yoselin Livingston PTA GP, CQ 2    [id number]  PT NEUROMUSC RE EDUCATION EA 15 MIN 3/15/2022 Yoselin Livingston PTA GP, CQ 2                    Yoselin Livingston PTA  3/15/2022      were performed. No intravenous contrast was administered.  Automated mA/kV exposure control was utilized and patient examination was performed in strict accordance with principles of ALARA. FINDINGS: Please note that the evaluation of vessels, lymph nodes and organs is limited without intravenous contrast.  LOWER CHEST: No cardiomegaly.  There is very small amount pericardial fluid..  Lung bases reveals chronic appearing lower lobe atelectasis or scarring.  ABDOMEN:  LIVER: No hepatomegaly.  Smooth surface contour.  Normal attenuation.  BILE DUCTS: No intrahepatic or extrahepatic biliary ductal dilatation.  GALLBLADDER: Inflammatory changes are demonstrated surrounding the gallbladder. There does appear to be component of underlying stones or sludge. STOMACH: No abnormalities identified.  PANCREAS: No masses or ductal dilatation.  SPLEEN: No splenomegaly or focal splenic lesion.  ADRENAL GLANDS: No thickening or nodules.  KIDNEYS AND URETERS: Kidneys are normal in size and location.  Renal cysts are similar as compared to 2022 there is a very small 5 mm hyperdense right renal cyst..  PELVIS:  BLADDER: Suprapubic catheter is demonstrated. REPRODUCTIVE ORGANS: No abnormalities identified.  BOWEL: No abnormalities identified.  There is somewhat of a rectal impaction appearance.  VESSELS: No abnormalities identified.  Abdominal aorta is normal in caliber.  PERITONEUM/RETROPERITONEUM/LYMPH NODES: No free fluid.  No pneumoperitoneum. No lymphadenopathy.  ABDOMINAL WALL: No abnormalities identified. SOFT TISSUES: No abnormalities identified.  BONES: No acute fracture or aggressive osseous lesion.    1.Inflammatory changes surrounding the gallbladder with underlying stones or sludge. Correlate with ultrasound. 2.Suggestion of mild Rectal impaction. 3.Small amount pericardial fluid. Signed by Gokul Rodriguez DO         Assessment/Plan   Principal Problem:    Acute cholecystitis   vs choledochitis vs cholelithiasis      History of psychosis, mood disorder  History epilepsy  History of auditory hallucinations  History of neurogenic bladder  History of colostomy    Plan:  - Patient stable to remain at inpatient level of care in current location  - Appreciate surgical consultation, will keep the patient n.p.o., hold off on repeat attempt for MRCP as surgery may elect to do a cholecystectomy, will follow-up  - If plan is for cholecystectomy, can consider premedication prior to repeat attempt for MRCP  - Continue antibiotics as is  - Will trend metabolic panel including hepatic functions  - Plan of care will be discussed with patient's family again today when formal recommendations    Enoxaparin for VTE prophylaxis, held morning dose (last given dose 7/5/2024 at 9 AM)  Code: Full           I spent 45 minutes in the professional and overall care of this patient.    Lake Means MD  [facility] [place]  Internal Medicine    This document was generated in whole or in part using the Dragon One medical voice recognition software and there may be some incorrect words/wording, spelling, or punctuation errors that were not corrected prior to finalization in the medical record.

## 2024-07-06 NOTE — CONSULTS
Department of Internal Medicine  Gastroenterology  Consult note      Reason for Consult: Choledocholithiasis    Chief Complaint: Abdominal pain and jaundice    History Obtained from: chart review and patient reports    History of Present Illness      The patient is a 53 y.o. male with signficant past medical history of epilepsy, encephalopathy, psychosis, mood affective disorder, neurogenic bladder who presents for jaundice and abdominal pain.    Patient denies abdominal pain, nausea, vomiting, dysphagia, odynophagia, GERD, constipation, diarrhea, or melena. They normally have regular bowel movements a day of normal caliber and color.  He does note occasionally he will be some blood in his ostomy.    Abdominal surgeries include total colectomy, unclear why completed    Allergies  Zosyn [piperacillin-tazobactam]    Current Medication    Current Facility-Administered Medications:     acetaminophen (Tylenol) tablet 650 mg, 650 mg, oral, q4h PRN **OR** acetaminophen (Tylenol) oral liquid 650 mg, 650 mg, nasogastric tube, q4h PRN **OR** acetaminophen (Tylenol) suppository 650 mg, 650 mg, rectal, q4h PRN, Thompson Tobar DO    acetaminophen (Tylenol) tablet 650 mg, 650 mg, oral, q4h PRN **OR** acetaminophen (Tylenol) oral liquid 650 mg, 650 mg, oral, q4h PRN **OR** acetaminophen (Tylenol) suppository 650 mg, 650 mg, rectal, q4h PRN, Thompson Tobar DO    cefepime (Maxipime) IV 1 g, 1 g, intravenous, q8h, Thompson Tobar DO, Stopped at 07/06/24 0427    clonazePAM (KlonoPIN) tablet 0.5 mg, 0.5 mg, oral, BID, Coleman Perez DO, 0.5 mg at 07/05/24 2312    [Held by provider] enoxaparin (Lovenox) syringe 40 mg, 40 mg, subcutaneous, Daily, Thompson Tobar DO    pantoprazole (ProtoNix) EC tablet 40 mg, 40 mg, oral, Daily before breakfast, 40 mg at 07/06/24 0639 **OR** esomeprazole (NexIUM) suspension 40 mg, 40 mg, nasoduodenal tube, Daily before breakfast **OR** pantoprazole (ProtoNix) injection 40 mg, 40 mg, intravenous, Daily before  breakfast, Thompson Tobar DO, 40 mg at 07/05/24 0607    levETIRAcetam (Keppra) tablet 1,000 mg, 1,000 mg, oral, TID, Coleman C Bressi, DO, 1,000 mg at 07/05/24 2312    metroNIDAZOLE (Flagyl) 500 mg in NaCl (iso-os) 100 mL, 500 mg, intravenous, Once, Thompson Tobar DO    metroNIDAZOLE (Flagyl) 500 mg in NaCl (iso-os) 100 mL, 500 mg, intravenous, q8h, Thompson Tobar DO, Stopped at 07/06/24 0320    ondansetron ODT (Zofran-ODT) disintegrating tablet 4 mg, 4 mg, oral, q8h PRN **OR** ondansetron (Zofran) injection 4 mg, 4 mg, intravenous, q8h PRN, Thompson Tobar, DO    OXcarbazepine (Trileptal) tablet 600 mg, 600 mg, oral, BID, Coleman C Bressi, DO, 600 mg at 07/05/24 2312    risperiDONE (RisperDAL) tablet 1.5 mg, 1.5 mg, oral, BID, Coleman C Bressi, DO, 1.5 mg at 07/05/24 2312    sodium chloride 0.9% infusion, 75 mL/hr, intravenous, Continuous, Lake Means MD, Last Rate: 75 mL/hr at 07/06/24 0521, 75 mL/hr at 07/06/24 0521    Past Medical History  Active Ambulatory Problems     Diagnosis Date Noted    Acquired intellectual disability 03/11/2023    Anxiety 03/11/2023    Complex dental caries 03/11/2023    Ileostomy in place (Multi) 03/11/2023    Insulin resistance syndrome 03/11/2023    Mood disorder (CMS-HCC) 03/11/2023    Neurogenic bladder 03/11/2023    Neurogenic bowel 03/11/2023    Obesity 03/11/2023    Physical debility 03/11/2023    Congenital encephalopathy (Multi) 03/11/2023    Seizure disorder (Multi) 03/11/2023    Suprapubic catheter (Multi) 03/11/2023    Unsp psychosis not due to a substance or known physiol cond (Multi) 03/11/2023    Vitamin D deficiency 03/11/2023    Urinary tract infection associated with catheterization of urinary tract (CMS-[facility]) 03/21/2023    Suprapubic catheter dysfunction (CMS-[facility]) 09/22/2023    Fall 10/24/2023    Cough 10/24/2023    Medicare annual wellness visit, subsequent 12/14/2023    Moderate dementia without behavioral disturbance, psychotic disturbance, mood disturbance, or anxiety,  "unspecified dementia type (Multi) 01/09/2024    COVID-19 virus infection 03/02/2024    Cellulitis 04/23/2024    Bladder fistula 06/25/2024     Resolved Ambulatory Problems     Diagnosis Date Noted    No Resolved Ambulatory Problems     Past Medical History:   Diagnosis Date    Auditory hallucinations     Encephalopathy     Epilepsy (Multi)     Other specified health status        Past Surgical History  Past Surgical History:   Procedure Laterality Date    IR  SUPRAPUBIC EXCHANGE  12/20/2023    IR  SUPRAPUBIC EXCHANGE 12/20/2023 Amilcar Souza MD [facility]    IR  SUPRAPUBIC PLACEMENT  11/21/2023    IR  SUPRAPUBIC PLACEMENT 11/21/2023 Amilcar Souza MD [facility]    OTHER SURGICAL HISTORY  12/21/2015    Laparoscopy Total Colectomy       Family History  No family history on file.  No family history of stomach or colon cancer    Social History  TOBACCO:  reports that he has never smoked. He has never used smokeless tobacco.  ETOH:  reports no history of alcohol use.  DRUGS:  reports no history of drug use.  MARITAL STATUS:   OCCUPATION:    Review of Systems  Review of Systems   Constitutional:  Negative for appetite change, chills, fever and unexpected weight change.   HENT:  Negative for mouth sores, sore throat and trouble swallowing.    Eyes:  Negative for pain and visual disturbance.   Respiratory:  Positive for shortness of breath. Negative for cough and wheezing.    Cardiovascular:  Negative for chest pain.   Genitourinary:  Negative for decreased urine volume, dysuria and hematuria.   Musculoskeletal:  Negative for arthralgias, joint swelling and myalgias.   Skin:  Negative for pallor and rash.   Neurological:  Negative for dizziness, weakness, numbness and headaches.   Psychiatric/Behavioral:  Negative for confusion.        PHYSICAL EXAM  VS: /75 (BP Location: Left arm, Patient Position: Lying)   Pulse 85   Temp 36.5 °C (97.7 °F) (Temporal)   Resp 18   Ht 1.95 m (6' 4.77\")   Wt 131 kg " (289 lb 11 oz)   SpO2 92%   BMI 34.56 kg/m²  Body mass index is 34.56 kg/m².  Physical Exam  Constitutional:       General: He is not in acute distress.     Appearance: Normal appearance.   HENT:      Mouth/Throat:      Mouth: Mucous membranes are moist.      Pharynx: Oropharynx is clear.   Eyes:      General: No scleral icterus.     Extraocular Movements: Extraocular movements intact.      Conjunctiva/sclera: Conjunctivae normal.      Pupils: Pupils are equal, round, and reactive to light.   Cardiovascular:      Rate and Rhythm: Normal rate and regular rhythm.      Heart sounds: No murmur heard.  Pulmonary:      Effort: Pulmonary effort is normal.      Breath sounds: No wheezing or rhonchi.   Abdominal:      General: Bowel sounds are normal. There is no distension.      Palpations: Abdomen is soft. There is no mass.      Tenderness: There is no abdominal tenderness.      Hernia: No hernia is present.      Comments: Ostomy in place with brown loose stool noted.   Musculoskeletal:         General: No swelling or deformity.   Skin:     General: Skin is warm.      Coloration: Skin is not jaundiced.   Neurological:      General: No focal deficit present.      Mental Status: He is alert and oriented to person, place, and time.   Psychiatric:         Mood and Affect: Mood normal.          DATA  Recent blood work and relevant radiology studies were reviewed and discussed with the patient   Results from last 7 days   Lab Units 07/06/24  0633   WBC AUTO x10*3/uL 8.6   RBC AUTO x10*6/uL 3.69*   HEMOGLOBIN g/dL 10.3*   HEMATOCRIT % 33.0*   MCV fL 89   MCHC g/dL 31.2*   RDW % 14.4   PLATELETS AUTO x10*3/uL 520*       Results from last 72 hours   Lab Units 07/06/24  0633   SODIUM mmol/L 134*   POTASSIUM mmol/L 4.3   CHLORIDE mmol/L 100   CO2 mmol/L 23   BUN mg/dL 13   CREATININE mg/dL 0.93   CALCIUM mg/dL 8.5*   PROTEIN TOTAL g/dL 6.6   BILIRUBIN TOTAL mg/dL 0.9   ALK PHOS U/L 172*   AST U/L 69*   ALT U/L 127*              Results from last 72 hours   Lab Units 07/03/24  1320   LIPASE U/L 18       RADIOLOGY REVIEW  MRI abdomen with without contrast 7/4/24  Pending    HIDA scan 7/5/2024  IMPRESSION:  1. Nonvisualization of gallbladder by 2 hours after radiotracer  injection on both planar and SPECT CT, suggestive of cystic duct obstruction and acute cholecystitis    Ultrasound gallbladder 7/4/2024  IMPRESSION:  Limited study.      Cholelithiasis. HIDA scan may be obtained if clinical suspicion for acute cholecystitis is high.      Minimal CBD dilatation, which may be age-appropriate. However, MRCP is recommended to rule out distal choledocholithiasis if clinically indicated.      Diffuse hepatic steatosis  CT abdomen pelvis with contrast 7/3/2024  IMPRESSION:  1.Inflammatory changes surrounding the gallbladder with  underlying stones or sludge. Correlate with ultrasound.  2.Suggestion of mild Rectal impaction.  3.Small amount pericardial fluid.    ENDOSCOPIC REVIEW  NA    IMPRESSION/RECOMMENDATIONS  The patient is a 53 y.o. male with signficant past medical history of epilepsy, encephalopathy, psychosis, mood affective disorder, neurogenic bladder who presents for jaundice and abdominal pain.    Acute cholecystitis -General surgery following, HIDA scan without uptake at 2 hours  Concern for choledocholithiasis -CBD 7mm, presented with total bili 3.3 now normalized.  Alk phos 127.  History of partial colectomy - unclear why completed, diverting ostomy in place LLQ     PLAN  Will follow MRI abdomen without contrast.  Patient attempted 7/5/2024 with reattempt 7/6  Will need outpatient colonoscopy for reported hematochezia   Currently on pantoprazole 40 mg daily  Currently on cefepime and Flagyl  Currently n.p.o. for MRI  Continue supportive care per primary team    Discussed with Dr. Rodriguez GI to follow    (Electronically signed byMonalisa Snyder PA-C on 7/6/2024 at 8:02 AM)    Inpatient consult to Gastroenterology  Consult performed  by: Monalisa Snyder PA-C  Consult ordered by: Lake Means MD

## 2024-07-06 NOTE — CONSULTS
Consults    Nato LAWRENCE Himanshu   [id number]   1970         Chief Complaint/        Gallstones and possible choledocholithiasis          HPI/    53-year-old male seen for biliary tract issues    Note is made the patient has cognitive impairment.  History is obtained from conversation with the patient as well as conversation with other healthcare providers, review of the chart and conversation with the patient's family    Patient lives in a facility where he gets chronic care.  He seemed to have a nonspecific nonspecific illness with the patient not acting like himself.  Seeming more tired and lethargic.  Is admitted to the hospital for this.    Patient noted to have some mild jaundice and elevated liver function test.  Imaging studies include an ultrasound showing gallstones.  HIDA scan demonstrates nonvisualization of the gallbladder.  CAT scan demonstrated no other acute pathology.    All the patient's been in the hospital his liver function tests have normalized.    Family reports that he has no chronic abdominal pain.  He generally eats well and his weight is stable    Past Medical History:   Diagnosis Date    Auditory hallucinations     Encephalopathy     Epilepsy (Multi)     Neurogenic bladder     Neurogenic bowel     Other specified health status     No pertinent past medical history   Seizure disorder    MRDD    Status post colon resection with creation of ileostomy for neurogenic intestinal disorder, details unavailable, 2006    Status post suprapubic catheter    Denies diabetes or coronary artery disease        Social History     Socioeconomic History    Marital status: Single     Spouse name: Not on file    Number of children: Not on file    Years of education: Not on file    Highest education level: Not on file   Occupational History    Not on file   Tobacco Use    Smoking status: Never    Smokeless tobacco: Never   Substance and Sexual Activity    Alcohol use: Never    Drug use: Never    Sexual activity:  Not on file   Other Topics Concern    Not on file   Social History Narrative    Not on file     Social Determinants of Health     Financial Resource Strain: Patient Unable To Answer (7/4/2024)    Overall Financial Resource Strain (CARDIA)     Difficulty of Paying Living Expenses: Patient unable to answer   Food Insecurity: No Food Insecurity (7/4/2024)    Hunger Vital Sign     Worried About Running Out of Food in the Last Year: Never true     Ran Out of Food in the Last Year: Never true   Transportation Needs: No Transportation Needs (7/4/2024)    PRAPARE - Transportation     Lack of Transportation (Medical): No     Lack of Transportation (Non-Medical): No   Physical Activity: Unknown (7/4/2024)    Exercise Vital Sign     Days of Exercise per Week: Patient unable to answer     Minutes of Exercise per Session: Patient declined   Stress: No Stress Concern Present (7/4/2024)    [employer] [employer] - Occupational Stress Questionnaire     Feeling of Stress : Not at all   Social Connections: Socially Isolated (7/4/2024)    Social Connection and Isolation Panel [NHANES]     Frequency of Communication with Friends and Family: Never     Frequency of Social Gatherings with Friends and Family: Never     Attends Zoroastrian Services: Never     Active Member of Clubs or Organizations: Yes     Attends Club or Organization Meetings: Never     Marital Status: Never    Intimate Partner Violence: Not At Risk (7/4/2024)    Humiliation, Afraid, Rape, and Kick questionnaire     Fear of Current or Ex-Partner: No     Emotionally Abused: No     Physically Abused: No     Sexually Abused: No   Housing Stability: Low Risk  (7/4/2024)    Housing Stability Vital Sign     Unable to Pay for Housing in the Last Year: No     Number of Places Lived in the Last Year: 1     Unstable Housing in the Last Year: No   Recent Concern: Housing Stability - High Risk (7/4/2024)    Housing Stability Vital Sign     Unable to Pay for  Housing in the Last Year: Yes     Number of Places Lived in the Last Year: 1     Unstable Housing in the Last Year: No      Non-smoker    No family history on file.     Review of Systems   Reason unable to perform ROS: Cognitive impairment.          Current Facility-Administered Medications:     acetaminophen (Tylenol) tablet 650 mg, 650 mg, oral, q4h PRN **OR** acetaminophen (Tylenol) oral liquid 650 mg, 650 mg, nasogastric tube, q4h PRN **OR** acetaminophen (Tylenol) suppository 650 mg, 650 mg, rectal, q4h PRN, Thompson Tobar DO    acetaminophen (Tylenol) tablet 650 mg, 650 mg, oral, q4h PRN **OR** acetaminophen (Tylenol) oral liquid 650 mg, 650 mg, oral, q4h PRN **OR** acetaminophen (Tylenol) suppository 650 mg, 650 mg, rectal, q4h PRN, Thompson Tobar DO    cefepime (Maxipime) IV 1 g, 1 g, intravenous, q8h, Thompson Tobar DO, Stopped at 07/06/24 1053    clonazePAM (KlonoPIN) tablet 0.5 mg, 0.5 mg, oral, BID, Coleman C Jefei, DO, 0.5 mg at 07/06/24 0957    [Held by provider] enoxaparin (Lovenox) syringe 40 mg, 40 mg, subcutaneous, Daily, Thompson Tobar DO    pantoprazole (ProtoNix) EC tablet 40 mg, 40 mg, oral, Daily before breakfast, 40 mg at 07/06/24 0639 **OR** esomeprazole (NexIUM) suspension 40 mg, 40 mg, nasoduodenal tube, Daily before breakfast **OR** pantoprazole (ProtoNix) injection 40 mg, 40 mg, intravenous, Daily before breakfast, Thompson Tobar DO, 40 mg at 07/05/24 0607    levETIRAcetam (Keppra) tablet 1,000 mg, 1,000 mg, oral, TID, Coleman C Bressi, DO, 1,000 mg at 07/06/24 0957    metroNIDAZOLE (Flagyl) 500 mg in NaCl (iso-os) 100 mL, 500 mg, intravenous, Once, Thompson Tobar DO    metroNIDAZOLE (Flagyl) 500 mg in NaCl (iso-os) 100 mL, 500 mg, intravenous, q8h, Thompson oTbar DO, Stopped at 07/06/24 1019    ondansetron ODT (Zofran-ODT) disintegrating tablet 4 mg, 4 mg, oral, q8h PRN **OR** ondansetron (Zofran) injection 4 mg, 4 mg, intravenous, q8h PRN, Thompson Tobar DO    OXcarbazepine (Trileptal) tablet 600  mg, 600 mg, oral, BID, Elver YOLANDA Brefranki, DO, 600 mg at 07/06/24 0957    risperiDONE (RisperDAL) tablet 1.5 mg, 1.5 mg, oral, BID, Elver C Bressi, DO, 1.5 mg at 07/06/24 0957    sodium chloride 0.9% infusion, 75 mL/hr, intravenous, Continuous, Lake Means MD, Last Rate: 75 mL/hr at 07/06/24 0521, 75 mL/hr at 07/06/24 0521    sodium chloride 0.9% infusion, 75 mL/hr, intravenous, Continuous, Lake Means MD       no      Xaralto  no      Eliquis  no      Coumadin  no      Plavix        Allergies   Allergen Reactions    Zosyn [Piperacillin-Tazobactam] Hives        NM hepatobiliary  Narrative: Interpreted By:  Polina Arroyo,   STUDY:  NM HEPATOBILIARY; 7/5/2024 4:12 pm      INDICATION:  Signs/Symptoms:Inconclusive RUQ US, RUQ tenderness.      COMPARISON:  CT of abdomen and pelvis on 07/03/2024.      ACCESSION NUMBER(S):  [id number]      ORDERING CLINICIAN:  ELVER CRAFT      TECHNIQUE:  DIVISION OF NUCLEAR MEDICINE  HEPATOBILIARY SCAN (HIDA)      The patient received an intravenous dose of 4.9 mCi of Tc-99m  mebrofenin (Choletec).  Sequential images of the upper abdomen were  then acquired over the next 120 minutes.      FINDINGS:  There is prompt accumulation of activity within the liver and normal  subsequent excretion via the biliary ductal system into the small  bowel. Nonvisualization of gallbladder by 2 hours after radiotracer  injection on both planar and SPECT CT suggestive of cystic duct  obstruction and acute cholecystitis.      Impression: 1. Nonvisualization of gallbladder by 2 hours after radiotracer  injection on both planar and SPECT CT, suggestive of cystic duct  obstruction and acute cholecystitis.      I personally reviewed the images/study. This study was interpreted at  [facility], [place], [place].      MACRO:  Critical Finding:  See findings. Notification was initiated on  7/5/2024 at 5:25 pm by Dr. Polina Arroyo to Dr Lake Means .  (**-YCF-**)          Signed by:  "Polina Arroyo 7/5/2024 5:32 PM  Dictation workstation:   [id number]       I have reviewed the images and the reports      Laboratory data:   CBC:   Lab Results   Component Value Date    WBC 8.6 07/06/2024    RBC 3.69 (L) 07/06/2024    HGB 10.3 (L) 07/06/2024    HCT 33.0 (L) 07/06/2024     (H) 07/06/2024   ]   [facility] Labs:   Lab Results   Component Value Date     (L) 07/06/2024    K 4.3 07/06/2024     07/06/2024    CO2 23 07/06/2024    BUN 13 07/06/2024    CREATININE 0.93 07/06/2024    CALCIUM 8.5 (L) 07/06/2024    PROT 6.6 07/06/2024    ALBUMIN 3.4 07/06/2024    BILITOT 0.9 07/06/2024    ALKPHOS 172 (H) 07/06/2024    AST 69 (H) 07/06/2024     (H) 07/06/2024          I have reviewed the above laboratory studies      /75 (BP Location: Left arm, Patient Position: Lying)   Pulse 85   Temp 36.5 °C (97.7 °F) (Temporal)   Resp 18   Ht 1.95 m (6' 4.77\")   Wt 131 kg (289 lb 11 oz)   SpO2 94%   BMI 34.56 kg/m²        Physical Exam  Constitutional:       Appearance: Normal appearance.   HENT:      Head: Normocephalic and atraumatic.   Cardiovascular:      Rate and Rhythm: Normal rate and regular rhythm.   Pulmonary:      Effort: Pulmonary effort is normal.      Breath sounds: Normal breath sounds.   Abdominal:      Comments: Right lower quadrant ileostomy noted    No Pendleton sign        Suprapubic urinary catheter noted   Musculoskeletal:      Cervical back: Normal range of motion.   Skin:     General: Skin is warm.   Neurological:      Mental Status: He is alert.      Comments: Moves all extremities                  Assessment/    Episode of transient jaundice suggestive of choledocholithiasis    Acute and chronic cholecystitis    History of colon surgery end ileostomy, possible hostile abdomen    Seizure disorder and MRDD        Plan/    Imaging studies are reviewed.  Patient clearly has evidence of cholelithiasis.    Patient had a transient episode of jaundice.  See no evidence of malignancy " or mass.  I suspect that the patient may have had transient passage of biliary calculi.    Recommend cholecystectomy given the nonvisualization, cholelithiasis plus the possible choledocholithiasis.  Patient needs a cholangiogram when his cholecystectomy is carried out    Patient carries higher than average risk for adverse events given his multiple comorbidities and disabilities.  Power of  however understands this    Patient at risk for an open procedure given the prior history of colon surgery.  Family aware of this as well    Gallbladder surgery reviewed with the patient.  The potential of bleeding infection MI PE death etc. reviewed.  The anticipated convalescence is reviewed.  The potential need to convert from a laparoscopic to an open procedure was reviewed.  Strategies for dealing with common bile duct stones to include open, endoscopic and laparoscopic procedures were reviewed.  The potential of blood products was reviewed.  Potential of bile duct injury is reviewed.  All questions were answered.  Consent is obtained.    Issues related to coronavirus are also reviewed.

## 2024-07-06 NOTE — PROGRESS NOTES
Patient returned from MRI. They were unsuccessful in completing it, the patient could not sit still. He told the technician that he was too scared to finish it. Writer will reach out to physician and try again tomorrow.

## 2024-07-06 NOTE — CARE PLAN
Problem: Skin  Goal: Decreased wound size/increased tissue granulation at next dressing change  Outcome: Progressing  Goal: Participates in plan/prevention/treatment measures  Outcome: Progressing  Goal: Prevent/manage excess moisture  Outcome: Progressing  Goal: Prevent/minimize sheer/friction injuries  Outcome: Progressing  Goal: Promote/optimize nutrition  Outcome: Progressing  Goal: Promote skin healing  Outcome: Progressing     Problem: Fall/Injury  Goal: Not fall by end of shift  Outcome: Progressing  Goal: Be free from injury by end of the shift  Outcome: Progressing  Goal: Verbalize understanding of personal risk factors for fall in the hospital  Outcome: Progressing  Goal: Verbalize understanding of risk factor reduction measures to prevent injury from fall in the home  Outcome: Progressing  Goal: Use assistive devices by end of the shift  Outcome: Progressing  Goal: Pace activities to prevent fatigue by end of the shift  Outcome: Progressing     Problem: Pain  Goal: Takes deep breaths with improved pain control throughout the shift  Outcome: Progressing  Goal: Turns in bed with improved pain control throughout the shift  Outcome: Progressing  Goal: Walks with improved pain control throughout the shift  Outcome: Progressing  Goal: Performs ADL's with improved pain control throughout shift  Outcome: Progressing  Goal: Participates in PT with improved pain control throughout the shift  Outcome: Progressing  Goal: Free from opioid side effects throughout the shift  Outcome: Progressing  Goal: Free from acute confusion related to pain meds throughout the shift  Outcome: Progressing     Problem: Pain - Adult  Goal: Verbalizes/displays adequate comfort level or baseline comfort level  Outcome: Progressing     Problem: Safety - Adult  Goal: Free from fall injury  Outcome: Progressing     Problem: Discharge Planning  Goal: Discharge to home or other facility with appropriate resources  Outcome: Progressing      Problem: Chronic Conditions and Co-morbidities  Goal: Patient's chronic conditions and co-morbidity symptoms are monitored and maintained or improved  Outcome: Progressing   The patient's goals for the shift include pt to demonstrate comfort aeb sleeping in interval of 3 hours or greater    The clinical goals for the shift include remain HDS

## 2024-07-06 NOTE — PROGRESS NOTES
Surgery scheduled for tomorrow at approximately 10 AM    MRCP not needed.  All attempts were made to perform a cholangiogram in order to exclude CBD stones

## 2024-07-07 ENCOUNTER — ANESTHESIA EVENT (OUTPATIENT)
Dept: OPERATING ROOM | Facility: HOSPITAL | Age: 54
End: 2024-07-07
Payer: MEDICARE

## 2024-07-07 ENCOUNTER — ANESTHESIA (OUTPATIENT)
Dept: OPERATING ROOM | Facility: HOSPITAL | Age: 54
End: 2024-07-07
Payer: MEDICARE

## 2024-07-07 VITALS
TEMPERATURE: 97.2 F | HEART RATE: 85 BPM | BODY MASS INDEX: 34.2 KG/M2 | OXYGEN SATURATION: 98 % | DIASTOLIC BLOOD PRESSURE: 85 MMHG | SYSTOLIC BLOOD PRESSURE: 134 MMHG | HEIGHT: 77 IN | RESPIRATION RATE: 18 BRPM | WEIGHT: 289.68 LBS

## 2024-07-07 LAB
ABO GROUP (TYPE) IN BLOOD: NORMAL
ABO GROUP (TYPE) IN BLOOD: NORMAL
ALBUMIN SERPL BCP-MCNC: 3.3 G/DL (ref 3.4–5)
ALP SERPL-CCNC: 166 U/L (ref 33–120)
ALT SERPL W P-5'-P-CCNC: 133 U/L (ref 10–52)
ANION GAP SERPL CALC-SCNC: 12 MMOL/L (ref 10–20)
ANTIBODY SCREEN: NORMAL
AST SERPL W P-5'-P-CCNC: 60 U/L (ref 9–39)
BACTERIA BLD CULT: NORMAL
BACTERIA BLD CULT: NORMAL
BILIRUB SERPL-MCNC: 0.8 MG/DL (ref 0–1.2)
BUN SERPL-MCNC: 10 MG/DL (ref 6–23)
CALCIUM SERPL-MCNC: 8.4 MG/DL (ref 8.6–10.3)
CHLORIDE SERPL-SCNC: 102 MMOL/L (ref 98–107)
CO2 SERPL-SCNC: 26 MMOL/L (ref 21–32)
CREAT SERPL-MCNC: 0.91 MG/DL (ref 0.5–1.3)
EGFRCR SERPLBLD CKD-EPI 2021: >90 ML/MIN/1.73M*2
ERYTHROCYTE [DISTWIDTH] IN BLOOD BY AUTOMATED COUNT: 14.2 % (ref 11.5–14.5)
GLUCOSE SERPL-MCNC: 105 MG/DL (ref 74–99)
HCT VFR BLD AUTO: 36 % (ref 41–52)
HGB BLD-MCNC: 11.5 G/DL (ref 13.5–17.5)
MAGNESIUM SERPL-MCNC: 1.91 MG/DL (ref 1.6–2.4)
MCH RBC QN AUTO: 28 PG (ref 26–34)
MCHC RBC AUTO-ENTMCNC: 31.9 G/DL (ref 32–36)
MCV RBC AUTO: 88 FL (ref 80–100)
NRBC BLD-RTO: 0 /100 WBCS (ref 0–0)
PLATELET # BLD AUTO: 316 X10*3/UL (ref 150–450)
POTASSIUM SERPL-SCNC: 3.5 MMOL/L (ref 3.5–5.3)
PROT SERPL-MCNC: 6.2 G/DL (ref 6.4–8.2)
RBC # BLD AUTO: 4.1 X10*6/UL (ref 4.5–5.9)
RH FACTOR (ANTIGEN D): NORMAL
RH FACTOR (ANTIGEN D): NORMAL
SODIUM SERPL-SCNC: 136 MMOL/L (ref 136–145)
WBC # BLD AUTO: 8.7 X10*3/UL (ref 4.4–11.3)

## 2024-07-07 PROCEDURE — 2500000005 HC RX 250 GENERAL PHARMACY W/O HCPCS: Performed by: ANESTHESIOLOGY

## 2024-07-07 PROCEDURE — 0WJG4ZZ INSPECTION OF PERITONEAL CAVITY, PERCUTANEOUS ENDOSCOPIC APPROACH: ICD-10-PCS | Performed by: SURGERY

## 2024-07-07 PROCEDURE — P9045 ALBUMIN (HUMAN), 5%, 250 ML: HCPCS | Mod: JZ | Performed by: NURSE ANESTHETIST, CERTIFIED REGISTERED

## 2024-07-07 PROCEDURE — 83735 ASSAY OF MAGNESIUM: CPT | Performed by: STUDENT IN AN ORGANIZED HEALTH CARE EDUCATION/TRAINING PROGRAM

## 2024-07-07 PROCEDURE — 3600000003 HC OR TIME - INITIAL BASE CHARGE - PROCEDURE LEVEL THREE: Performed by: SURGERY

## 2024-07-07 PROCEDURE — 2500000001 HC RX 250 WO HCPCS SELF ADMINISTERED DRUGS (ALT 637 FOR MEDICARE OP)

## 2024-07-07 PROCEDURE — 0FT40ZZ RESECTION OF GALLBLADDER, OPEN APPROACH: ICD-10-PCS | Performed by: SURGERY

## 2024-07-07 PROCEDURE — 2500000004 HC RX 250 GENERAL PHARMACY W/ HCPCS (ALT 636 FOR OP/ED): Performed by: INTERNAL MEDICINE

## 2024-07-07 PROCEDURE — 2500000004 HC RX 250 GENERAL PHARMACY W/ HCPCS (ALT 636 FOR OP/ED): Performed by: NURSE ANESTHETIST, CERTIFIED REGISTERED

## 2024-07-07 PROCEDURE — 36415 COLL VENOUS BLD VENIPUNCTURE: CPT | Performed by: STUDENT IN AN ORGANIZED HEALTH CARE EDUCATION/TRAINING PROGRAM

## 2024-07-07 PROCEDURE — 2500000005 HC RX 250 GENERAL PHARMACY W/O HCPCS: Performed by: NURSE ANESTHETIST, CERTIFIED REGISTERED

## 2024-07-07 PROCEDURE — 36415 COLL VENOUS BLD VENIPUNCTURE: CPT | Performed by: NURSE ANESTHETIST, CERTIFIED REGISTERED

## 2024-07-07 PROCEDURE — 2500000002 HC RX 250 W HCPCS SELF ADMINISTERED DRUGS (ALT 637 FOR MEDICARE OP, ALT 636 FOR OP/ED): Performed by: ANESTHESIOLOGY

## 2024-07-07 PROCEDURE — 88304 TISSUE EXAM BY PATHOLOGIST: CPT | Performed by: STUDENT IN AN ORGANIZED HEALTH CARE EDUCATION/TRAINING PROGRAM

## 2024-07-07 PROCEDURE — 3700000002 HC GENERAL ANESTHESIA TIME - EACH INCREMENTAL 1 MINUTE: Performed by: SURGERY

## 2024-07-07 PROCEDURE — C9113 INJ PANTOPRAZOLE SODIUM, VIA: HCPCS | Performed by: STUDENT IN AN ORGANIZED HEALTH CARE EDUCATION/TRAINING PROGRAM

## 2024-07-07 PROCEDURE — 2500000004 HC RX 250 GENERAL PHARMACY W/ HCPCS (ALT 636 FOR OP/ED): Performed by: SURGERY

## 2024-07-07 PROCEDURE — A47563 PR LAP,CHOLECYSTECTOMY/GRAPH: Performed by: ANESTHESIOLOGY

## 2024-07-07 PROCEDURE — 0DNW4ZZ RELEASE PERITONEUM, PERCUTANEOUS ENDOSCOPIC APPROACH: ICD-10-PCS | Performed by: SURGERY

## 2024-07-07 PROCEDURE — 86901 BLOOD TYPING SEROLOGIC RH(D): CPT | Performed by: NURSE ANESTHETIST, CERTIFIED REGISTERED

## 2024-07-07 PROCEDURE — C9113 INJ PANTOPRAZOLE SODIUM, VIA: HCPCS | Performed by: SURGERY

## 2024-07-07 PROCEDURE — 85027 COMPLETE CBC AUTOMATED: CPT | Performed by: STUDENT IN AN ORGANIZED HEALTH CARE EDUCATION/TRAINING PROGRAM

## 2024-07-07 PROCEDURE — 7100000002 HC RECOVERY ROOM TIME - EACH INCREMENTAL 1 MINUTE: Performed by: SURGERY

## 2024-07-07 PROCEDURE — 1100000001 HC PRIVATE ROOM DAILY

## 2024-07-07 PROCEDURE — 7100000001 HC RECOVERY ROOM TIME - INITIAL BASE CHARGE: Performed by: SURGERY

## 2024-07-07 PROCEDURE — 88304 TISSUE EXAM BY PATHOLOGIST: CPT | Mod: TC,STJLAB | Performed by: SURGERY

## 2024-07-07 PROCEDURE — 84075 ASSAY ALKALINE PHOSPHATASE: CPT | Performed by: STUDENT IN AN ORGANIZED HEALTH CARE EDUCATION/TRAINING PROGRAM

## 2024-07-07 PROCEDURE — A47563 PR LAP,CHOLECYSTECTOMY/GRAPH: Performed by: NURSE ANESTHETIST, CERTIFIED REGISTERED

## 2024-07-07 PROCEDURE — 99232 SBSQ HOSP IP/OBS MODERATE 35: CPT | Performed by: PHYSICIAN ASSISTANT

## 2024-07-07 PROCEDURE — 2500000004 HC RX 250 GENERAL PHARMACY W/ HCPCS (ALT 636 FOR OP/ED): Performed by: STUDENT IN AN ORGANIZED HEALTH CARE EDUCATION/TRAINING PROGRAM

## 2024-07-07 PROCEDURE — 2720000007 HC OR 272 NO HCPCS: Performed by: SURGERY

## 2024-07-07 PROCEDURE — C1887 CATHETER, GUIDING: HCPCS | Performed by: SURGERY

## 2024-07-07 PROCEDURE — 99232 SBSQ HOSP IP/OBS MODERATE 35: CPT | Performed by: INTERNAL MEDICINE

## 2024-07-07 PROCEDURE — 3600000008 HC OR TIME - EACH INCREMENTAL 1 MINUTE - PROCEDURE LEVEL THREE: Performed by: SURGERY

## 2024-07-07 PROCEDURE — 3700000001 HC GENERAL ANESTHESIA TIME - INITIAL BASE CHARGE: Performed by: SURGERY

## 2024-07-07 PROCEDURE — 47563 LAPARO CHOLECYSTECTOMY/GRAPH: CPT | Performed by: SURGERY

## 2024-07-07 PROCEDURE — 2500000001 HC RX 250 WO HCPCS SELF ADMINISTERED DRUGS (ALT 637 FOR MEDICARE OP): Performed by: INTERNAL MEDICINE

## 2024-07-07 PROCEDURE — 2500000004 HC RX 250 GENERAL PHARMACY W/ HCPCS (ALT 636 FOR OP/ED): Performed by: ANESTHESIOLOGY

## 2024-07-07 RX ORDER — KETOROLAC TROMETHAMINE 30 MG/ML
30 INJECTION, SOLUTION INTRAMUSCULAR; INTRAVENOUS EVERY 6 HOURS SCHEDULED
Status: DISPENSED | OUTPATIENT
Start: 2024-07-07 | End: 2024-07-10

## 2024-07-07 RX ORDER — ONDANSETRON HYDROCHLORIDE 2 MG/ML
INJECTION, SOLUTION INTRAVENOUS AS NEEDED
Status: DISCONTINUED | OUTPATIENT
Start: 2024-07-07 | End: 2024-07-07

## 2024-07-07 RX ORDER — PANTOPRAZOLE SODIUM 40 MG/10ML
40 INJECTION, POWDER, LYOPHILIZED, FOR SOLUTION INTRAVENOUS DAILY
Status: DISCONTINUED | OUTPATIENT
Start: 2024-07-07 | End: 2024-07-07 | Stop reason: SDUPTHER

## 2024-07-07 RX ORDER — FENTANYL CITRATE 50 UG/ML
INJECTION, SOLUTION INTRAMUSCULAR; INTRAVENOUS AS NEEDED
Status: DISCONTINUED | OUTPATIENT
Start: 2024-07-07 | End: 2024-07-07

## 2024-07-07 RX ORDER — HYDROCODONE BITARTRATE AND ACETAMINOPHEN 5; 325 MG/1; MG/1
1 TABLET ORAL EVERY 4 HOURS PRN
Status: DISCONTINUED | OUTPATIENT
Start: 2024-07-07 | End: 2024-07-07 | Stop reason: HOSPADM

## 2024-07-07 RX ORDER — PROPOFOL 10 MG/ML
INJECTION, EMULSION INTRAVENOUS AS NEEDED
Status: DISCONTINUED | OUTPATIENT
Start: 2024-07-07 | End: 2024-07-07

## 2024-07-07 RX ORDER — LABETALOL HYDROCHLORIDE 5 MG/ML
5 INJECTION, SOLUTION INTRAVENOUS
Status: DISCONTINUED | OUTPATIENT
Start: 2024-07-07 | End: 2024-07-07 | Stop reason: HOSPADM

## 2024-07-07 RX ORDER — SODIUM CHLORIDE, SODIUM LACTATE, POTASSIUM CHLORIDE, CALCIUM CHLORIDE 600; 310; 30; 20 MG/100ML; MG/100ML; MG/100ML; MG/100ML
125 INJECTION, SOLUTION INTRAVENOUS CONTINUOUS
Status: DISCONTINUED | OUTPATIENT
Start: 2024-07-07 | End: 2024-07-11 | Stop reason: HOSPADM

## 2024-07-07 RX ORDER — SODIUM CHLORIDE, SODIUM LACTATE, POTASSIUM CHLORIDE, CALCIUM CHLORIDE 600; 310; 30; 20 MG/100ML; MG/100ML; MG/100ML; MG/100ML
INJECTION, SOLUTION INTRAVENOUS CONTINUOUS PRN
Status: DISCONTINUED | OUTPATIENT
Start: 2024-07-07 | End: 2024-07-07

## 2024-07-07 RX ORDER — PANTOPRAZOLE SODIUM 40 MG/10ML
40 INJECTION, POWDER, LYOPHILIZED, FOR SOLUTION INTRAVENOUS DAILY
Status: DISCONTINUED | OUTPATIENT
Start: 2024-07-07 | End: 2024-07-09

## 2024-07-07 RX ORDER — ALBUTEROL SULFATE 0.83 MG/ML
2.5 SOLUTION RESPIRATORY (INHALATION)
Status: DISCONTINUED | OUTPATIENT
Start: 2024-07-07 | End: 2024-07-07 | Stop reason: HOSPADM

## 2024-07-07 RX ORDER — MORPHINE SULFATE 4 MG/ML
4 INJECTION, SOLUTION INTRAMUSCULAR; INTRAVENOUS EVERY 2 HOUR PRN
Status: DISCONTINUED | OUTPATIENT
Start: 2024-07-07 | End: 2024-07-11 | Stop reason: HOSPADM

## 2024-07-07 RX ORDER — SODIUM CHLORIDE, SODIUM LACTATE, POTASSIUM CHLORIDE, CALCIUM CHLORIDE 600; 310; 30; 20 MG/100ML; MG/100ML; MG/100ML; MG/100ML
100 INJECTION, SOLUTION INTRAVENOUS CONTINUOUS
Status: DISCONTINUED | OUTPATIENT
Start: 2024-07-07 | End: 2024-07-07 | Stop reason: HOSPADM

## 2024-07-07 RX ORDER — MORPHINE SULFATE 2 MG/ML
2 INJECTION, SOLUTION INTRAMUSCULAR; INTRAVENOUS EVERY 2 HOUR PRN
Status: ACTIVE | OUTPATIENT
Start: 2024-07-07 | End: 2024-07-10

## 2024-07-07 RX ORDER — ALBUMIN HUMAN 50 G/1000ML
SOLUTION INTRAVENOUS AS NEEDED
Status: DISCONTINUED | OUTPATIENT
Start: 2024-07-07 | End: 2024-07-07

## 2024-07-07 RX ORDER — MIDAZOLAM HYDROCHLORIDE 1 MG/ML
1 INJECTION, SOLUTION INTRAMUSCULAR; INTRAVENOUS ONCE AS NEEDED
Status: DISCONTINUED | OUTPATIENT
Start: 2024-07-07 | End: 2024-07-07 | Stop reason: HOSPADM

## 2024-07-07 RX ORDER — PHENYLEPHRINE HCL IN 0.9% NACL 1 MG/10 ML
SYRINGE (ML) INTRAVENOUS AS NEEDED
Status: DISCONTINUED | OUTPATIENT
Start: 2024-07-07 | End: 2024-07-07

## 2024-07-07 RX ORDER — ROCURONIUM BROMIDE 10 MG/ML
INJECTION, SOLUTION INTRAVENOUS AS NEEDED
Status: DISCONTINUED | OUTPATIENT
Start: 2024-07-07 | End: 2024-07-07

## 2024-07-07 RX ORDER — SODIUM CHLORIDE 9 MG/ML
75 INJECTION, SOLUTION INTRAVENOUS CONTINUOUS
Status: DISCONTINUED | OUTPATIENT
Start: 2024-07-07 | End: 2024-07-07

## 2024-07-07 RX ORDER — LIDOCAINE HYDROCHLORIDE 20 MG/ML
INJECTION, SOLUTION INFILTRATION; PERINEURAL AS NEEDED
Status: DISCONTINUED | OUTPATIENT
Start: 2024-07-07 | End: 2024-07-07

## 2024-07-07 RX ORDER — HYDRALAZINE HYDROCHLORIDE 20 MG/ML
5 INJECTION INTRAMUSCULAR; INTRAVENOUS EVERY 30 MIN PRN
Status: DISCONTINUED | OUTPATIENT
Start: 2024-07-07 | End: 2024-07-07 | Stop reason: HOSPADM

## 2024-07-07 RX ORDER — MIDAZOLAM HYDROCHLORIDE 1 MG/ML
INJECTION, SOLUTION INTRAMUSCULAR; INTRAVENOUS CONTINUOUS PRN
Status: DISCONTINUED | OUTPATIENT
Start: 2024-07-07 | End: 2024-07-07

## 2024-07-07 RX ORDER — DIPHENHYDRAMINE HYDROCHLORIDE 50 MG/ML
12.5 INJECTION INTRAMUSCULAR; INTRAVENOUS ONCE AS NEEDED
Status: DISCONTINUED | OUTPATIENT
Start: 2024-07-07 | End: 2024-07-07 | Stop reason: HOSPADM

## 2024-07-07 RX ORDER — HYDROMORPHONE HYDROCHLORIDE 1 MG/ML
1 INJECTION, SOLUTION INTRAMUSCULAR; INTRAVENOUS; SUBCUTANEOUS EVERY 5 MIN PRN
Status: DISCONTINUED | OUTPATIENT
Start: 2024-07-07 | End: 2024-07-07 | Stop reason: HOSPADM

## 2024-07-07 RX ORDER — HYDROMORPHONE HYDROCHLORIDE 1 MG/ML
INJECTION, SOLUTION INTRAMUSCULAR; INTRAVENOUS; SUBCUTANEOUS AS NEEDED
Status: DISCONTINUED | OUTPATIENT
Start: 2024-07-07 | End: 2024-07-07

## 2024-07-07 RX ADMIN — Medication 300 MCG: at 13:01

## 2024-07-07 RX ADMIN — ONDANSETRON 4 MG: 2 INJECTION, SOLUTION INTRAMUSCULAR; INTRAVENOUS at 13:39

## 2024-07-07 RX ADMIN — HYDROMORPHONE HYDROCHLORIDE 1 MG: 1 INJECTION, SOLUTION INTRAMUSCULAR; INTRAVENOUS; SUBCUTANEOUS at 13:39

## 2024-07-07 RX ADMIN — LIDOCAINE HYDROCHLORIDE 20 MG: 20 INJECTION, SOLUTION INFILTRATION; PERINEURAL at 10:15

## 2024-07-07 RX ADMIN — ROCURONIUM BROMIDE 20 MG: 10 INJECTION INTRAVENOUS at 13:15

## 2024-07-07 RX ADMIN — ALBUMIN HUMAN 500 ML: 0.05 INJECTION, SOLUTION INTRAVENOUS at 13:14

## 2024-07-07 RX ADMIN — PROPOFOL 150 MG: 10 INJECTION, EMULSION INTRAVENOUS at 10:15

## 2024-07-07 RX ADMIN — HEPARIN SODIUM 5000 UNITS: 5000 INJECTION INTRAVENOUS; SUBCUTANEOUS at 10:21

## 2024-07-07 RX ADMIN — FENTANYL CITRATE 50 MCG: 50 INJECTION, SOLUTION INTRAMUSCULAR; INTRAVENOUS at 10:40

## 2024-07-07 RX ADMIN — ROCURONIUM BROMIDE 50 MG: 10 INJECTION INTRAVENOUS at 10:15

## 2024-07-07 RX ADMIN — SODIUM CHLORIDE, POTASSIUM CHLORIDE, SODIUM LACTATE AND CALCIUM CHLORIDE: 600; 310; 30; 20 INJECTION, SOLUTION INTRAVENOUS at 11:46

## 2024-07-07 RX ADMIN — FENTANYL CITRATE 50 MCG: 50 INJECTION, SOLUTION INTRAMUSCULAR; INTRAVENOUS at 10:15

## 2024-07-07 RX ADMIN — SUGAMMADEX 400 MG: 100 INJECTION, SOLUTION INTRAVENOUS at 13:59

## 2024-07-07 RX ADMIN — SODIUM CHLORIDE, POTASSIUM CHLORIDE, SODIUM LACTATE AND CALCIUM CHLORIDE: 600; 310; 30; 20 INJECTION, SOLUTION INTRAVENOUS at 10:05

## 2024-07-07 RX ADMIN — ROCURONIUM BROMIDE 30 MG: 10 INJECTION INTRAVENOUS at 12:17

## 2024-07-07 SDOH — HEALTH STABILITY: MENTAL HEALTH: CURRENT SMOKER: 0

## 2024-07-07 ASSESSMENT — PAIN SCALES - GENERAL
PAINLEVEL_OUTOF10: 0 - NO PAIN
PAINLEVEL_OUTOF10: 4
PAINLEVEL_OUTOF10: 5 - MODERATE PAIN
PAINLEVEL_OUTOF10: 0 - NO PAIN

## 2024-07-07 ASSESSMENT — PAIN - FUNCTIONAL ASSESSMENT: PAIN_FUNCTIONAL_ASSESSMENT: 0-10

## 2024-07-07 ASSESSMENT — PAIN DESCRIPTION - LOCATION: LOCATION: ABDOMEN

## 2024-07-07 NOTE — PROGRESS NOTES
Department of Internal Medicine  Gastroenterology  Progress note      Subjective  GI is following for concern of choledocholithiasis    Today, patient is off the floor for cholecystectomy    Sister in room.  Discussed cholecystectomy and IOC.  If positive   Will need ERCP, risks and benefits reviewed.  Discussed patient stating he has intermittent blood in his ostomy bag.  This would require endoscopic evaluation as well and obtaining his colon cancer screening.  Sister does not believe they would pursue at this time      Current Medication    Current Facility-Administered Medications:     acetaminophen (Tylenol) tablet 650 mg, 650 mg, oral, q4h PRN **OR** acetaminophen (Tylenol) oral liquid 650 mg, 650 mg, nasogastric tube, q4h PRN **OR** acetaminophen (Tylenol) suppository 650 mg, 650 mg, rectal, q4h PRN, Thompson Tobar DO    acetaminophen (Tylenol) tablet 650 mg, 650 mg, oral, q4h PRN **OR** acetaminophen (Tylenol) oral liquid 650 mg, 650 mg, oral, q4h PRN **OR** acetaminophen (Tylenol) suppository 650 mg, 650 mg, rectal, q4h PRN, Thompson Tobar DO    cefepime (Maxipime) IV 1 g, 1 g, intravenous, q8h, Thompson Tobar DO, Stopped at 07/07/24 0204    clonazePAM (KlonoPIN) tablet 0.5 mg, 0.5 mg, oral, BID, Coleman Perez DO, 0.5 mg at 07/06/24 2058    [Held by provider] enoxaparin (Lovenox) syringe 40 mg, 40 mg, subcutaneous, Daily, Thompson Tobar DO    pantoprazole (ProtoNix) EC tablet 40 mg, 40 mg, oral, Daily before breakfast, 40 mg at 07/06/24 0639 **OR** esomeprazole (NexIUM) suspension 40 mg, 40 mg, nasoduodenal tube, Daily before breakfast **OR** pantoprazole (ProtoNix) injection 40 mg, 40 mg, intravenous, Daily before breakfast, Thompson Tobar DO, 40 mg at 07/07/24 0557    heparin (porcine) injection 5,000 Units, 5,000 Units, subcutaneous, Once in OR, Aidan Sapp MD    levETIRAcetam (Keppra) tablet 1,000 mg, 1,000 mg, oral, TID, Coleman C DO Ana, 1,000 mg at 07/07/24 0816    metroNIDAZOLE (Flagyl) 500 mg  in NaCl (iso-os) 100 mL, 500 mg, intravenous, Once, Thompson Tobar DO    metroNIDAZOLE (Flagyl) 500 mg in NaCl (iso-os) 100 mL, 500 mg, intravenous, q8h, Thompson Tobar DO, Stopped at 07/07/24 0154    ondansetron ODT (Zofran-ODT) disintegrating tablet 4 mg, 4 mg, oral, q8h PRN **OR** ondansetron (Zofran) injection 4 mg, 4 mg, intravenous, q8h PRN, Thompson Tobar DO    OXcarbazepine (Trileptal) tablet 600 mg, 600 mg, oral, BID, Coleman C Bressi, DO, 600 mg at 07/06/24 2058    risperiDONE (RisperDAL) tablet 1.5 mg, 1.5 mg, oral, BID, Coleman C Bressi, , 1.5 mg at 07/06/24 2058    sodium chloride 0.9% infusion, 75 mL/hr, intravenous, Continuous, Lake Means MD, Last Rate: 75 mL/hr at 07/06/24 1641, 75 mL/hr at 07/06/24 1641    sodium chloride 0.9% infusion, 75 mL/hr, intravenous, Continuous, Jonathan Adams DO, Last Rate: 75 mL/hr at 07/07/24 0557, 75 mL/hr at 07/07/24 0557    Past Medical History  Active Ambulatory Problems     Diagnosis Date Noted    Acquired intellectual disability 03/11/2023    Anxiety 03/11/2023    Complex dental caries 03/11/2023    Ileostomy in place (Multi) 03/11/2023    Insulin resistance syndrome 03/11/2023    Mood disorder (CMS-[facility]) 03/11/2023    Neurogenic bladder 03/11/2023    Neurogenic bowel 03/11/2023    Obesity 03/11/2023    Physical debility 03/11/2023    Congenital encephalopathy (Multi) 03/11/2023    Seizure disorder (Multi) 03/11/2023    Suprapubic catheter (Multi) 03/11/2023    Unsp psychosis not due to a substance or known physiol cond (Multi) 03/11/2023    Vitamin D deficiency 03/11/2023    Urinary tract infection associated with catheterization of urinary tract (CMS-[facility]) 03/21/2023    Suprapubic catheter dysfunction (CMS-[facility]) 09/22/2023    Fall 10/24/2023    Cough 10/24/2023    Medicare annual wellness visit, subsequent 12/14/2023    Moderate dementia without behavioral disturbance, psychotic disturbance, mood disturbance, or anxiety, unspecified dementia type (Multi)  "01/09/2024    COVID-19 virus infection 03/02/2024    Cellulitis 04/23/2024    Bladder fistula 06/25/2024     Resolved Ambulatory Problems     Diagnosis Date Noted    No Resolved Ambulatory Problems     Past Medical History:   Diagnosis Date    Auditory hallucinations     Encephalopathy     Epilepsy (Multi)     Other specified health status        PHYSICAL EXAM  VS: /77   Pulse 71   Temp 36.4 °C (97.5 °F) (Temporal)   Resp 16   Ht 1.95 m (6' 4.77\")   Wt 131 kg (289 lb 11 oz)   SpO2 92%   BMI 34.56 kg/m²  Body mass index is 34.56 kg/m².  Patient out of room     DATA  Recent blood work was reviewed and discussed with the patient   Results from last 7 days   Lab Units 07/07/24  0645   WBC AUTO x10*3/uL 8.7   RBC AUTO x10*6/uL 4.10*   HEMOGLOBIN g/dL 11.5*   HEMATOCRIT % 36.0*   MCV fL 88   MCHC g/dL 31.9*   RDW % 14.2   PLATELETS AUTO x10*3/uL 316       Results from last 72 hours   Lab Units 07/07/24  0645   SODIUM mmol/L 136   POTASSIUM mmol/L 3.5   CHLORIDE mmol/L 102   CO2 mmol/L 26   BUN mg/dL 10   CREATININE mg/dL 0.91   CALCIUM mg/dL 8.4*   PROTEIN TOTAL g/dL 6.2*   BILIRUBIN TOTAL mg/dL 0.8   ALK PHOS U/L 166*   AST U/L 60*   ALT U/L 133*       RADIOLOGY REVIEW  NA    ENDOSCOPIC REVIEW  NA    IMPRESSION/RECOMMENDATIONS  The patient is a 53 y.o. male with signficant past medical history of epilepsy, encephalopathy, psychosis, mood affective disorder, neurogenic bladder who presents for jaundice and abdominal pain.     Acute cholecystitis -General surgery following, plan for lap ale with IOC 7/7, HIDA scan without uptake at 2 hours  Concern for choledocholithiasis -CBD 7mm, presented with total bili 3.3 now normalized.  Alk phos 127. Mild to moderate concern for possible CBD obstruction  History of partial colectomy - megacolon, diverting ostomy in place LLQ      PLAN  MRCP canceled per surgery, plan for lap ale with IOC. Will follow for ERCP needs  Will need outpatient colonoscopy for reported " hematochezia   Currently on pantoprazole 40 mg daily  Currently on cefepime and Flagyl  Currently n.p.o. for OR  Continue supportive care per primary team     Discussed with Dr. Rodriguez GI to follow in periphery for IOC results.     (Electronically signed byMonalisa Snyder PA-C on 7/7/2024 at 8:20 AM)

## 2024-07-07 NOTE — ANESTHESIA PREPROCEDURE EVALUATION
Patient: Nato Downs    Procedure Information       Date/Time: 07/07/24 1030    Procedure: Cholecystectomy Laparoscopy with Cholangiogram    Location: [facility] 07 / [facility]    Surgeons: Aidan Sapp MD            Relevant Problems   Anesthesia   (+) Complex dental caries      Neuro   (+) Anxiety   (+) Moderate dementia without behavioral disturbance, psychotic disturbance, mood disturbance, or anxiety, unspecified dementia type (Multi)   (+) Seizure disorder (Multi)      /Renal   (+) Urinary tract infection associated with catheterization of urinary tract (CMS-HCC)      Liver   (+) Acute cholecystitis      Endocrine   (+) Obesity      ID   (+) COVID-19 virus infection   (+) Complex dental caries   (+) Urinary tract infection associated with catheterization of urinary tract (CMS-HCC)       Clinical information reviewed:   Tobacco  Allergies  Meds   Med Hx  Surg Hx   Fam Hx  Soc Hx        NPO Detail:  No data recorded     Physical Exam    Airway  Mallampati: III  TM distance: >3 FB  Neck ROM: limited  Comments: Previous anesthesia records inform of uneventful LMA use. No prior DL/ETT placement records.   Cardiovascular - normal exam     Dental   Comments: Poor dentition   Pulmonary - normal exam     Abdominal - normal exam           Vitals:    07/07/24 0755   BP: 139/77   Pulse: 71   Resp: 16   Temp: 36.4 °C (97.5 °F)   SpO2: 92%       Past Surgical History:   Procedure Laterality Date    IR  SUPRAPUBIC EXCHANGE  12/20/2023    IR  SUPRAPUBIC EXCHANGE 12/20/2023 Amilcar Souza MD [facility]    IR  SUPRAPUBIC PLACEMENT  11/21/2023    IR  SUPRAPUBIC PLACEMENT 11/21/2023 Amilcar Souza MD [facility]    OTHER SURGICAL HISTORY  12/21/2015    Laparoscopy Total Colectomy     Past Medical History:   Diagnosis Date    Auditory hallucinations     Encephalopathy     Epilepsy (Multi)     Neurogenic bladder     Neurogenic bowel     Other specified health status     No pertinent past medical  history       Current Facility-Administered Medications:     acetaminophen (Tylenol) tablet 650 mg, 650 mg, oral, q4h PRN **OR** acetaminophen (Tylenol) oral liquid 650 mg, 650 mg, nasogastric tube, q4h PRN **OR** acetaminophen (Tylenol) suppository 650 mg, 650 mg, rectal, q4h PRN, Thompson Tobar DO    acetaminophen (Tylenol) tablet 650 mg, 650 mg, oral, q4h PRN **OR** acetaminophen (Tylenol) oral liquid 650 mg, 650 mg, oral, q4h PRN **OR** acetaminophen (Tylenol) suppository 650 mg, 650 mg, rectal, q4h PRN, Thompson Tobar DO    cefepime (Maxipime) IV 1 g, 1 g, intravenous, q8h, Thompson Tobar DO, Stopped at 07/07/24 0915    clonazePAM (KlonoPIN) tablet 0.5 mg, 0.5 mg, oral, BID, Coleman Perez DO, 0.5 mg at 07/06/24 2058    [Held by provider] enoxaparin (Lovenox) syringe 40 mg, 40 mg, subcutaneous, Daily, Thompson Tobar DO    pantoprazole (ProtoNix) EC tablet 40 mg, 40 mg, oral, Daily before breakfast, 40 mg at 07/06/24 0639 **OR** esomeprazole (NexIUM) suspension 40 mg, 40 mg, nasoduodenal tube, Daily before breakfast **OR** pantoprazole (ProtoNix) injection 40 mg, 40 mg, intravenous, Daily before breakfast, Thompson Tobar DO, 40 mg at 07/07/24 0557    heparin (porcine) injection 5,000 Units, 5,000 Units, subcutaneous, Once in OR, Aidan Sapp MD    levETIRAcetam (Keppra) tablet 1,000 mg, 1,000 mg, oral, TID, Coleman Perez DO, 1,000 mg at 07/07/24 0816    metroNIDAZOLE (Flagyl) 500 mg in NaCl (iso-os) 100 mL, 500 mg, intravenous, Once, Thompson Tobar DO    metroNIDAZOLE (Flagyl) 500 mg in NaCl (iso-os) 100 mL, 500 mg, intravenous, q8h, Thompson Tobar DO, Last Rate: 0 mL/hr at 07/07/24 0154, 500 mg at 07/07/24 0923    ondansetron ODT (Zofran-ODT) disintegrating tablet 4 mg, 4 mg, oral, q8h PRN **OR** ondansetron (Zofran) injection 4 mg, 4 mg, intravenous, q8h PRN, Thompson Tobar DO    OXcarbazepine (Trileptal) tablet 600 mg, 600 mg, oral, BID, Coleman Perez DO, 600 mg at 07/06/24 2058    risperiDONE (RisperDAL)  tablet 1.5 mg, 1.5 mg, oral, BID, Coleman GUERRERO Ana, DO, 1.5 mg at 07/06/24 2058    sodium chloride 0.9% infusion, 75 mL/hr, intravenous, Continuous, Lake Means MD, Last Rate: 75 mL/hr at 07/06/24 1641, 75 mL/hr at 07/06/24 1641    sodium chloride 0.9% infusion, 75 mL/hr, intravenous, Continuous, Jonathan Adams DO, Last Rate: 75 mL/hr at 07/07/24 0557, 75 mL/hr at 07/07/24 0557  Prior to Admission medications    Medication Sig Start Date End Date Taking? Authorizing Provider   acetaminophen (Tylenol) 325 mg tablet Take 2 tablets (650 mg) by mouth every 6 hours if needed for mild pain (1 - 3).   Yes Historical Provider, MD   ascorbic acid (Vitamin C) 500 mg tablet Take 1 tablet (500 mg) by mouth once daily.   Yes Historical Provider, MD   cholecalciferol (Vitamin D-3) 5,000 Units tablet Take 1 tablet (5,000 Units) by mouth once daily.   Yes Historical Provider, MD   clonazePAM (KlonoPIN) 0.5 mg tablet Take 1 tablet (0.5 mg) by mouth 2 times a day. 3/18/24  Yes NASREEN Larios   cranberry fruit (cranberry) 450 mg tablet Take 1 tablet by mouth once daily in the morning.   Yes Historical Provider, MD   diazePAM (Diastat Acudial) 5-7.5-10 mg rectal kit Insert 10 mg into the rectum if needed for seizures. Prior to administration, review instruction sheet supplied with dose unit. Verify the ordered dose is set for administration. 3/18/24  Yes NASREEN Larios   levETIRAcetam (Keppra) 1,000 mg tablet Take 1 tablet (1,000 mg) by mouth 3 times a day.   Yes Historical Provider, MD   omeprazole OTC (PriLOSEC OTC) 20 mg EC tablet Take 1 tablet (20 mg) by mouth once daily in the morning. Take before meals. Do not crush, chew, or split.   Yes Historical Provider, MD   OXcarbazepine (Trileptal) 600 mg tablet Take 1 tablet (600 mg) by mouth 2 times a day.   Yes Historical Provider, MD   oxybutynin XL (Ditropan-XL) 10 mg 24 hr tablet Take 1 tablet (10 mg) by mouth once daily. Do not crush, chew, or split.   Yes  Historical Provider, MD   risperiDONE (RisperDAL) 1 mg tablet Take 1.5 tablets (1.5 mg) by mouth 2 times a day.   Yes Historical Provider, MD   vit E acet/vit Bcomp,C/zinc (B COMPLEX-C-E-ZN ORAL) Take 1 tablet by mouth once daily.   Yes Historical Provider, MD   wheat dextrin (Benefiber Healthy Shape) 5 gram/7.4 gram powder Take 10 mL by mouth once daily.   Yes Historical Provider, MD   ascorbic acid (Vitamin C) 500 mg ER capsule Take 1 capsule (500 mg) by mouth once daily.  7/4/24  Historical Provider, MD   calcium carbonate-vitamin D3 500 mg-5 mcg (200 unit) tablet Take 1 tablet by mouth once daily.  7/4/24  Historical Provider, MD     Allergies   Allergen Reactions    Zosyn [Piperacillin-Tazobactam] Hives     Social History     Tobacco Use    Smoking status: Never    Smokeless tobacco: Never   Substance Use Topics    Alcohol use: Never         Chemistry    Lab Results   Component Value Date/Time     07/07/2024 0645    K 3.5 07/07/2024 0645     07/07/2024 0645    CO2 26 07/07/2024 0645    BUN 10 07/07/2024 0645    CREATININE 0.91 07/07/2024 0645    Lab Results   Component Value Date/Time    CALCIUM 8.4 (L) 07/07/2024 0645    ALKPHOS 166 (H) 07/07/2024 0645    AST 60 (H) 07/07/2024 0645     (H) 07/07/2024 0645    BILITOT 0.8 07/07/2024 0645          Lab Results   Component Value Date/Time    WBC 8.7 07/07/2024 0645    HGB 11.5 (L) 07/07/2024 0645    HCT 36.0 (L) 07/07/2024 0645     07/07/2024 0645     Lab Results   Component Value Date/Time    PROTIME 11.5 12/20/2023 0720    INR 1.0 12/20/2023 0720     No results found for this or any previous visit (from the past 4464 hour(s)).     Anesthesia Plan    History of general anesthesia?: yes  History of complications of general anesthesia?: no    ASA 3     general     The patient is not a current smoker.    intravenous induction     Plan discussed with CRNA.

## 2024-07-07 NOTE — OP NOTE
Cholecystectomy Laparoscopy with Cholangiogram Operative Note     Date: 7/3/2024 - 2024  OR Location: [facility] OR    Name: Nato Downs, : 1970, Age: 53 y.o., MRN: [id number], Sex: male    Diagnosis  Pre-op Diagnosis     * Acute cholecystitis [K81.0] Post-op Diagnosis     * Acute cholecystitis [K81.0]     Procedures  Cholecystectomy Laparoscopy with Cholangiogram  23436 - [place] [facility]APS SURG CHOLECYSTECTOMY W/CHOLANGIOGRAPHY      Surgeons      * Aidan Sapp - Primary    Resident/Fellow/Other Assistant:  Surgeons and Role:  * No surgeons found with a matching role *    Procedure Summary  Anesthesia: General  ASA: III  Anesthesia Staff: Anesthesiologist: Anay Lira MD  CRNA: CHARLA Aviles-CRNA  Estimated Blood Loss: 50 mL  Intra-op Medications: Administrations occurring from 1030 to 1140 on 24:  * No intraprocedure medications in log *           Anesthesia Record               Intraprocedure I/O Totals          Intake    LR infusion 1000.00 mL    albumin human 5 % 500.00 mL    Total Intake 1500 mL       Output    Est. Blood Loss 150 mL    Total Output 150 mL       Net    Net Volume 1350 mL          Specimen:   ID Type Source Tests Collected by Time   1 : GALLBLADDER Tissue GALLBLADDER CHOLECYSTECTOMY SURGICAL PATHOLOGY EXAM Aidan Sapp MD 2024 1052        Staff:   Circulator: Altin  Scrub Person: Codi Ramosub Person: Montserrat Laboy Person: Malcolm         Drains and/or Catheters:   Closed/Suction Drain 1 RUQ Bulb 10 Fr. (Active)       Colostomy RLQ (Active)   Stomal Appliance 1 piece 24   Site/Stoma Assessment Clean;Intact 24 011   Peristomal Assessment Clean;Intact 24 0119   Treatment Pouch change 24 193   Drainage Characteristics Green;Brown 24 193   Output (mL) 75 mL 24 0400       Suprapubic Catheter (Active)   Site/Stoma Assessment Leaking 24 0433   Dressing Status Clean;Dry 24 0110   Dressing Type Dry dressing 24  0110   Collection Container Standard drainage bag 07/07/24 0110   Reason for Continuing Urinary Catheterization chronic urinary retention 07/07/24 0110   Output (mL) 700 mL 07/07/24 0926       Tourniquet Times:       Patient is 53-year-old male symptomatic gallstones and a real episode of jaundice that related to the choledocholithiasis.  Patient presents now for cholecystectomy    Risk benefits indications for this were reviewed with the patient's power of  desouza.  Potential bleeding infection MI PE death etc. reviewed.  The anticipated convalescence is reviewed.  Tension need to convert from a laparoscopic to an open procedure is reviewed.  Strategies for dealing with common bile duct stones to include open endoscopic and laparoscopic procedures were reviewed.  All questions were answered and consent was obtained    Note is made the patient has had multiple prior abdominal surgeries including a total colectomy with ileostomy.  Patient and family understood he was at increased risk for an open procedure.    Patient was taken to the operating room placed on table in supine position general esthesia was obtained abdomen was prepped and draped in a sterile fashion.  Again the ileostomy was draped out of the field as was the suprapubic tube    Antibiotics were given.  DVT prophylaxis obtaining subcutaneous heparin placement of external meta compression stockings timeout procedures were followed.    Incision and an open cutdown were made in the right upper quadrant far away from the prior midline incision.  Incision was made and dissection carried down to the anterior sheath.  Anterior sheath was opened and the muscular layers bluntly  and the posterior sheath elevated added with 15 blade.  There were no adhesions at this level 1 Vinicius trichrome was secured pneumoperitoneum was obtained    Scope was inserted and there were several adhesions to the abdominal wall however was fairly easy to place  several 5 mm ports and a 512 port.  Once all the ports were in some lysis of adhesions and accomplished port to facilitate exposure    All bladder was now identified to be contracted and thick-walled and edematous with difficulty was encountered placing graspers.  Gallbladder is now placed on cephalad traction on the omental adhesions taken down again there was a good deal of fibrosis around the gallbladder    Distal gallbladder was exposed and attempts made to identify anatomy and Calot's triangle however these were unsuccessful.  I difficulty encountered establishing appropriate planes.  Gallbladder was entered at this point in time as well    Given the degree of chronic inflammation I thought most prudent to convert to an open procedure    With this in mind all the ports were removed and a generous right subcostal incision was made.  Exposure was obtained the gallbladder identified again of small contracted.  Graspers were placed on the gallbladder and the gallbladder was now removed in a top-down fashion.  And the fundus was mobilized all the way down to the origins of the cystic duct.  Again a great deal of fibrosis was encountered between the gallbladder and the liver bed.  Again this is contracted and very scarred in gallbladder.    Upon reaching the origins of the cystic duct further inflammation was encountered.    Gallbladder already been entered so therefore the opening of the gallbladder was now further extended.  All of the stones were removed.  I attempted to pass a cholangiogram catheter to the cystic duct opening from the internal aspect however this was unsuccessful.    At this time I doubt that further dissection would put the patient at risk for possible bile duct other injuries.  Again all the stones removed from the gallbladder.  Gallbladder was controlled with several 2 oh Vicryl Endoloops and the redundant portion excised.    Copious irrigation was now carried out.  Hemostasis was obtained.   Counts reported as correct.  10 mm flat Antwan-Luke drain was placed in liver bed and brought out through a lateral stab wound anchored to skin with a silk stitch    Posterior sheath was closed with running 2-0 Vicryl suture.  Anterior sheath was closed with interrupted #1 PDS suture.  Skin wounds were closed with staples.    Patient tolerated procedure well        Aidan Sapp  Phone Number: [phone]

## 2024-07-07 NOTE — CARE PLAN
The patient's goals for the shift include get tests done    The clinical goals for the shift include pt will demonstrate comfort aeb sleeping in intervals of 3hours or greater    Over the shift, the patient did make progress towards his goals, pt is alert and oriented x2-3 able to make needs known has denied pain or discomfort, has rested in bed able to reposition self, has had some drainage from around supra pubic cath, care provided and dressing changed, pt has been npo since midnight and has slept in long intervals pt has remained safe this shift.

## 2024-07-07 NOTE — NURSING NOTE
Pt received from PACU via bed post op lap ale converted to open. Pt awake but groggy. O2 via n/c at 5l . Abd drsgs with some shadowing noted. SERENA drain patent draining sm amts bloody drng.  Suprapubic catheter patent.

## 2024-07-07 NOTE — PROGRESS NOTES
"Nato Downs is a 53 y.o. male on day 1 of admission presenting with Acute cholecystitis.    Subjective   Patient is pleasant, he is laying in bed and does not have any acute complaints today, he denies fevers or chills.  He has been kept NPO for surgery today.    Objective     Physical Exam  Vitals reviewed.   Constitutional:       General: He is awake. He is not in acute distress.     Appearance: Normal appearance. He is not toxic-appearing.      Comments: Poor dentition   HENT:      Head: Normocephalic and atraumatic.      Right Ear: External ear normal.      Left Ear: External ear normal.      Nose: Nose normal.   Eyes:      Extraocular Movements: Extraocular movements intact.   Cardiovascular:      Rate and Rhythm: Normal rate and regular rhythm.   Pulmonary:      Effort: Pulmonary effort is normal. No respiratory distress.   Abdominal:      General: There is no distension.      Palpations: Abdomen is soft.      Tenderness: There is right upper quadrant tenderness on palpation again today.  There is a colostomy in the right lower quadrant with soft bile colored liquid in the basin  Musculoskeletal:         General: No signs of injury.      Cervical back: Normal range of motion.   Skin:     General: Skin is warm and dry.   Neurological:      General: No focal deficit present.      Mental Status: He is alert and oriented to person, place, and time. Mental status is at baseline.   Psychiatric:         Mood and Affect: Mood normal.         Behavior: Behavior normal. Behavior is cooperative.     Last Recorded Vitals  Blood pressure 139/77, pulse 71, temperature 36.4 °C (97.5 °F), temperature source Temporal, resp. rate 16, height 1.95 m (6' 4.77\"), weight 131 kg (289 lb 11 oz), SpO2 92%.  Intake/Output last 3 Shifts:  I/O last 3 completed shifts:  In: 1696.3 (12.9 mL/kg) [P.O.:300; I.V.:796.3 (6.1 mL/kg); IV Piggyback:600]  Out: 5325 (40.5 mL/kg) [Urine:1050 (0.2 mL/kg/hr); Drains:3600; Stool:675]  Weight: 131.4 " kg     Relevant Results  Scheduled medications  cefepime, 1 g, intravenous, q8h  clonazePAM, 0.5 mg, oral, BID  [Held by provider] enoxaparin, 40 mg, subcutaneous, Daily  pantoprazole, 40 mg, oral, Daily before breakfast   Or  esomeprazole, 40 mg, nasoduodenal tube, Daily before breakfast   Or  pantoprazole, 40 mg, intravenous, Daily before breakfast  heparin, 5,000 Units, subcutaneous, Once in OR  levETIRAcetam, 1,000 mg, oral, TID  metroNIDAZOLE, 500 mg, intravenous, Once  metroNIDAZOLE, 500 mg, intravenous, q8h  OXcarbazepine, 600 mg, oral, BID  risperiDONE, 1.5 mg, oral, BID      Continuous medications  sodium chloride 0.9%, 75 mL/hr, Last Rate: 75 mL/hr (07/06/24 1641)  sodium chloride 0.9%, 75 mL/hr, Last Rate: 75 mL/hr (07/07/24 0557)      PRN medications  PRN medications: acetaminophen **OR** acetaminophen **OR** acetaminophen, acetaminophen **OR** acetaminophen **OR** acetaminophen, ondansetron ODT **OR** ondansetron    Results for orders placed or performed during the hospital encounter of 07/03/24 (from the past 24 hour(s))   Magnesium   Result Value Ref Range    Magnesium 1.91 1.60 - 2.40 mg/dL   CBC   Result Value Ref Range    WBC 8.7 4.4 - 11.3 x10*3/uL    nRBC 0.0 0.0 - 0.0 /100 WBCs    RBC 4.10 (L) 4.50 - 5.90 x10*6/uL    Hemoglobin 11.5 (L) 13.5 - 17.5 g/dL    Hematocrit 36.0 (L) 41.0 - 52.0 %    MCV 88 80 - 100 fL    MCH 28.0 26.0 - 34.0 pg    MCHC 31.9 (L) 32.0 - 36.0 g/dL    RDW 14.2 11.5 - 14.5 %    Platelets 316 150 - 450 x10*3/uL   Comprehensive metabolic panel   Result Value Ref Range    Glucose 105 (H) 74 - 99 mg/dL    Sodium 136 136 - 145 mmol/L    Potassium 3.5 3.5 - 5.3 mmol/L    Chloride 102 98 - 107 mmol/L    Bicarbonate 26 21 - 32 mmol/L    Anion Gap 12 10 - 20 mmol/L    Urea Nitrogen 10 6 - 23 mg/dL    Creatinine 0.91 0.50 - 1.30 mg/dL    eGFR >90 >60 mL/min/1.73m*2    Calcium 8.4 (L) 8.6 - 10.3 mg/dL    Albumin 3.3 (L) 3.4 - 5.0 g/dL    Alkaline Phosphatase 166 (H) 33 - 120 U/L     Total Protein 6.2 (L) 6.4 - 8.2 g/dL    AST 60 (H) 9 - 39 U/L    Bilirubin, Total 0.8 0.0 - 1.2 mg/dL     (H) 10 - 52 U/L     US gallbladder    Result Date: 7/4/2024  Interpreted By:  Elías Lucero, STUDY: US GALLBLADDER;  7/4/2024 3:02 pm   INDICATION: Signs/Symptoms:Evaluation of cholecystitis.   COMPARISON: None.   ACCESSION NUMBER(S): [id number]   ORDERING CLINICIAN: ELVER CRAFT   TECHNIQUE: Multiple images of the right upper quadrant were obtained.   FINDINGS: The study is limited due to patient's postprandial status and inability to hold his breath.   Visualized liver parenchyma shows increased echogenicity. There are multiple stones in the gallbladder. Gallbladder wall thickness could not be accurately measured due to incomplete distention no pericholecystic fluid is noted. Sonographic Pendleton's sign has been reported as negative. Common bile duct measures 0.7 cm in diameter. The pancreas is obscured by the overlying bowel gas. Visualized portal vein and right kidney are unremarkable. There is no free fluid in the Moreno's pouch.       Limited study.   Cholelithiasis. HIDA scan may be obtained if clinical suspicion for acute cholecystitis is high.   Minimal CBD dilatation, which may be age-appropriate. However, MRCP is recommended to rule out distal choledocholithiasis if clinically indicated.   Diffuse hepatic steatosis   Signed by: Elías Lucero 7/4/2024 4:43 PM Dictation workstation:   [id number]    CT abdomen pelvis wo IV contrast    Result Date: 7/3/2024  STUDY: CT Abdomen and Pelvis without IV Contrast; 07/03/2024 9:07 pm INDICATION: Abdominal pain.  Elevated liver enzymes. COMPARISON: CT A/P 06/20/2022. ACCESSION NUMBER(S): [id number] ORDERING CLINICIAN: KEYANA LEI TECHNIQUE: CT of the abdomen and pelvis was performed.  Contiguous axial images were obtained at 3 mm slice thickness through the abdomen and pelvis. Coronal and sagittal reconstructions at 3 mm slice thickness were  performed. No intravenous contrast was administered.  Automated mA/kV exposure control was utilized and patient examination was performed in strict accordance with principles of ALARA. FINDINGS: Please note that the evaluation of vessels, lymph nodes and organs is limited without intravenous contrast.  LOWER CHEST: No cardiomegaly.  There is very small amount pericardial fluid..  Lung bases reveals chronic appearing lower lobe atelectasis or scarring.  ABDOMEN:  LIVER: No hepatomegaly.  Smooth surface contour.  Normal attenuation.  BILE DUCTS: No intrahepatic or extrahepatic biliary ductal dilatation.  GALLBLADDER: Inflammatory changes are demonstrated surrounding the gallbladder. There does appear to be component of underlying stones or sludge. STOMACH: No abnormalities identified.  PANCREAS: No masses or ductal dilatation.  SPLEEN: No splenomegaly or focal splenic lesion.  ADRENAL GLANDS: No thickening or nodules.  KIDNEYS AND URETERS: Kidneys are normal in size and location.  Renal cysts are similar as compared to 2022 there is a very small 5 mm hyperdense right renal cyst..  PELVIS:  BLADDER: Suprapubic catheter is demonstrated. REPRODUCTIVE ORGANS: No abnormalities identified.  BOWEL: No abnormalities identified.  There is somewhat of a rectal impaction appearance.  VESSELS: No abnormalities identified.  Abdominal aorta is normal in caliber.  PERITONEUM/RETROPERITONEUM/LYMPH NODES: No free fluid.  No pneumoperitoneum. No lymphadenopathy.  ABDOMINAL WALL: No abnormalities identified. SOFT TISSUES: No abnormalities identified.  BONES: No acute fracture or aggressive osseous lesion.    1.Inflammatory changes surrounding the gallbladder with underlying stones or sludge. Correlate with ultrasound. 2.Suggestion of mild Rectal impaction. 3.Small amount pericardial fluid. Signed by Gokul Rodriguez DO         Assessment/Plan   Principal Problem:    Acute cholecystitis   vs choledochitis vs cholelithiasis     History  of psychosis, mood disorder  History epilepsy  History of auditory hallucinations  History of neurogenic bladder  History of colostomy    Plan:  - Patient stable to remain at inpatient level of care in current location  - Appreciate surgical consultation, will keep the patient n.p.o. until cholecystectomy later this morning  - Continue antibiotics as is  - Will trend metabolic panel including hepatic functions  - Plan of care will be discussed with patient's family again today after surgery    Enoxaparin for VTE prophylaxis, held morning dose again for surgery (last given dose 7/5/2024 at 9 AM)  Code: Full           I spent 45 minutes in the professional and overall care of this patient.    Lake Means MD  [facility] [place]  Internal Medicine    This document was generated in whole or in part using the Dragon One medical voice recognition software and there may be some incorrect words/wording, spelling, or punctuation errors that were not corrected prior to finalization in the medical record.

## 2024-07-07 NOTE — ANESTHESIA POSTPROCEDURE EVALUATION
Patient: Nato Donws    Procedure Summary       Date: 07/07/24 Room / Location: [facility] OR 07 / Virtual [facility] OR    Anesthesia Start: 1005 Anesthesia Stop: 1422    Procedure: Cholecystectomy Laparoscopy with Cholangiogram Diagnosis:       Acute cholecystitis      (Acute cholecystitis [K81.0])    Surgeons: Aidan Sapp MD Responsible Provider: Aany Lira MD    Anesthesia Type: general ASA Status: 3            Anesthesia Type: general    Vitals Value Taken Time   /64 07/07/24 1420   Temp 36.0 07/07/24 1432   Pulse 83 07/07/24 1430   Resp 17 07/07/24 1430   SpO2 95 % 07/07/24 1430   Vitals shown include unfiled device data.    Anesthesia Post Evaluation    Patient location during evaluation: PACU  Patient participation: complete - patient participated  Level of consciousness: sleepy but conscious  Pain management: satisfactory to patient  Airway patency: patent  Cardiovascular status: acceptable  Respiratory status: acceptable  Hydration status: euvolemic  Postoperative Nausea and Vomiting: none        No notable events documented.

## 2024-07-07 NOTE — ANESTHESIA PROCEDURE NOTES
Airway  Date/Time: 7/7/2024 10:18 AM  Urgency: elective    Airway not difficult    Staffing  Performed: CRNA   Authorized by: Anay Lira MD    Performed by: CHARLA Aviles-CRNA  Patient location during procedure: OR    Indications and Patient Condition  Indications for airway management: anesthesia  Spontaneous Ventilation: absent  Sedation level: deep  Preoxygenated: yes  Patient position: sniffing  Mask difficulty assessment: 2 - vent by mask + OA or adjuvant +/- NMBA    Final Airway Details  Final airway type: endotracheal airway      Successful airway: ETT     Successful intubation technique: video laryngoscopy  Facilitating devices/methods: intubating stylet  Blade size: #4  ETT size (mm): 7.5  Cormack-Lehane Classification: grade I - full view of glottis  Placement verified by: capnometry   Measured from: lips  ETT to lips (cm): 23  Number of attempts at approach: 1

## 2024-07-07 NOTE — BRIEF OP NOTE
Date: 7/3/2024 - 2024  OR Location: [facility] OR    Name: Nato Downs, : 1970, Age: 53 y.o., MRN: [id number], Sex: male    Diagnosis  Pre-op Diagnosis     * Acute cholecystitis [K81.0] Post-op Diagnosis     * Acute cholecystitis [K81.0]     Procedures  Cholecystectomy Laparoscopy with Cholangiogram  40926 - [place] LAPS SURG CHOLECYSTECTOMY W/CHOLANGIOGRAPHY      Surgeons      * Aidan Sapp - Primary    Resident/Fellow/Other Assistant:  Surgeons and Role:  * No surgeons found with a matching role *    Procedure Summary  Anesthesia: General  ASA: III  Anesthesia Staff: Anesthesiologist: Anay Lira MD  CRNA: CHARLA Aviles-CRNA  Estimated Blood Loss: 50 mL  Intra-op Medications: Administrations occurring from 1030 to 1140 on 24:  * No intraprocedure medications in log *           Anesthesia Record               Intraprocedure I/O Totals          Intake    LR infusion 1000.00 mL    albumin human 5 % 500.00 mL    Total Intake 1500 mL       Output    Est. Blood Loss 150 mL    Total Output 150 mL       Net    Net Volume 1350 mL          Specimen:   ID Type Source Tests Collected by Time   1 : GALLBLADDER Tissue GALLBLADDER CHOLECYSTECTOMY SURGICAL PATHOLOGY EXAM Aidan Sapp MD 2024 1052        Staff:   Circulator: Jael  Scrub Person: Codi  Scrub Person: Montserrat Ramosub Person: Malcolm          Findings: Contracted gallbladder with a lot of adhesions, procedure converted to open    Complications:  None; patient tolerated the procedure well.     Disposition: PACU - hemodynamically stable.  Condition: stable  Specimens Collected:   ID Type Source Tests Collected by Time   1 : GALLBLADDER Tissue GALLBLADDER CHOLECYSTECTOMY SURGICAL PATHOLOGY EXAM Aidan Sapp MD 2024 1052     Attending Attestation: I performed the procedure.    Aidan Sapp  Phone Number: [phone]

## 2024-07-08 LAB
ALBUMIN SERPL BCP-MCNC: 3.3 G/DL (ref 3.4–5)
ALP SERPL-CCNC: 126 U/L (ref 33–120)
ALT SERPL W P-5'-P-CCNC: 125 U/L (ref 10–52)
ANION GAP SERPL CALC-SCNC: 13 MMOL/L (ref 10–20)
AST SERPL W P-5'-P-CCNC: 66 U/L (ref 9–39)
BACTERIA BLD CULT: NORMAL
BACTERIA BLD CULT: NORMAL
BILIRUB SERPL-MCNC: 0.7 MG/DL (ref 0–1.2)
BUN SERPL-MCNC: 10 MG/DL (ref 6–23)
CALCIUM SERPL-MCNC: 7.9 MG/DL (ref 8.6–10.3)
CHLORIDE SERPL-SCNC: 102 MMOL/L (ref 98–107)
CO2 SERPL-SCNC: 25 MMOL/L (ref 21–32)
CREAT SERPL-MCNC: 0.94 MG/DL (ref 0.5–1.3)
EGFRCR SERPLBLD CKD-EPI 2021: >90 ML/MIN/1.73M*2
ERYTHROCYTE [DISTWIDTH] IN BLOOD BY AUTOMATED COUNT: 14.6 % (ref 11.5–14.5)
GLUCOSE SERPL-MCNC: 90 MG/DL (ref 74–99)
HCT VFR BLD AUTO: 34.5 % (ref 41–52)
HGB BLD-MCNC: 10.8 G/DL (ref 13.5–17.5)
HOLD SPECIMEN: NORMAL
MAGNESIUM SERPL-MCNC: 1.92 MG/DL (ref 1.6–2.4)
MCH RBC QN AUTO: 27.8 PG (ref 26–34)
MCHC RBC AUTO-ENTMCNC: 31.3 G/DL (ref 32–36)
MCV RBC AUTO: 89 FL (ref 80–100)
NRBC BLD-RTO: 0 /100 WBCS (ref 0–0)
PLATELET # BLD AUTO: 307 X10*3/UL (ref 150–450)
POTASSIUM SERPL-SCNC: 3.7 MMOL/L (ref 3.5–5.3)
PROT SERPL-MCNC: 5.8 G/DL (ref 6.4–8.2)
RBC # BLD AUTO: 3.89 X10*6/UL (ref 4.5–5.9)
SODIUM SERPL-SCNC: 136 MMOL/L (ref 136–145)
WBC # BLD AUTO: 16.2 X10*3/UL (ref 4.4–11.3)

## 2024-07-08 PROCEDURE — C9113 INJ PANTOPRAZOLE SODIUM, VIA: HCPCS | Performed by: SURGERY

## 2024-07-08 PROCEDURE — 99232 SBSQ HOSP IP/OBS MODERATE 35: CPT | Performed by: NURSE PRACTITIONER

## 2024-07-08 PROCEDURE — 85027 COMPLETE CBC AUTOMATED: CPT | Performed by: SURGERY

## 2024-07-08 PROCEDURE — 2500000004 HC RX 250 GENERAL PHARMACY W/ HCPCS (ALT 636 FOR OP/ED): Mod: JZ | Performed by: INTERNAL MEDICINE

## 2024-07-08 PROCEDURE — 80053 COMPREHEN METABOLIC PANEL: CPT | Performed by: INTERNAL MEDICINE

## 2024-07-08 PROCEDURE — 36415 COLL VENOUS BLD VENIPUNCTURE: CPT | Performed by: INTERNAL MEDICINE

## 2024-07-08 PROCEDURE — 99024 POSTOP FOLLOW-UP VISIT: CPT | Performed by: SURGERY

## 2024-07-08 PROCEDURE — 83735 ASSAY OF MAGNESIUM: CPT | Performed by: INTERNAL MEDICINE

## 2024-07-08 PROCEDURE — 1100000001 HC PRIVATE ROOM DAILY

## 2024-07-08 PROCEDURE — 99232 SBSQ HOSP IP/OBS MODERATE 35: CPT | Performed by: INTERNAL MEDICINE

## 2024-07-08 PROCEDURE — 97165 OT EVAL LOW COMPLEX 30 MIN: CPT | Mod: GO | Performed by: OCCUPATIONAL THERAPIST

## 2024-07-08 PROCEDURE — 97161 PT EVAL LOW COMPLEX 20 MIN: CPT | Mod: GP

## 2024-07-08 PROCEDURE — 2500000001 HC RX 250 WO HCPCS SELF ADMINISTERED DRUGS (ALT 637 FOR MEDICARE OP): Performed by: INTERNAL MEDICINE

## 2024-07-08 PROCEDURE — 2500000004 HC RX 250 GENERAL PHARMACY W/ HCPCS (ALT 636 FOR OP/ED): Performed by: SURGERY

## 2024-07-08 RX ORDER — METRONIDAZOLE 500 MG/1
500 TABLET ORAL EVERY 8 HOURS SCHEDULED
Status: DISCONTINUED | OUTPATIENT
Start: 2024-07-08 | End: 2024-07-09

## 2024-07-08 RX ORDER — ENOXAPARIN SODIUM 100 MG/ML
40 INJECTION SUBCUTANEOUS EVERY 24 HOURS
Status: CANCELLED | OUTPATIENT
Start: 2024-07-08

## 2024-07-08 RX ORDER — CEFEPIME 1 G/50ML
2 INJECTION, SOLUTION INTRAVENOUS EVERY 8 HOURS
Status: COMPLETED | OUTPATIENT
Start: 2024-07-08 | End: 2024-07-08

## 2024-07-08 RX ORDER — HEPARIN SODIUM 5000 [USP'U]/ML
5000 INJECTION, SOLUTION INTRAVENOUS; SUBCUTANEOUS EVERY 8 HOURS
Status: DISCONTINUED | OUTPATIENT
Start: 2024-07-08 | End: 2024-07-11 | Stop reason: HOSPADM

## 2024-07-08 ASSESSMENT — COGNITIVE AND FUNCTIONAL STATUS - GENERAL
DRESSING REGULAR UPPER BODY CLOTHING: A LOT
MOBILITY SCORE: 10
DAILY ACTIVITIY SCORE: 15
STANDING UP FROM CHAIR USING ARMS: A LOT
TURNING FROM BACK TO SIDE WHILE IN FLAT BAD: TOTAL
CLIMB 3 TO 5 STEPS WITH RAILING: TOTAL
DRESSING REGULAR LOWER BODY CLOTHING: A LOT
TOILETING: A LOT
MOVING TO AND FROM BED TO CHAIR: A LOT
HELP NEEDED FOR BATHING: A LOT
WALKING IN HOSPITAL ROOM: A LOT
MOVING FROM LYING ON BACK TO SITTING ON SIDE OF FLAT BED WITH BEDRAILS: A LOT
PERSONAL GROOMING: A LITTLE

## 2024-07-08 ASSESSMENT — PAIN SCALES - GENERAL
PAINLEVEL_OUTOF10: 0 - NO PAIN
PAINLEVEL_OUTOF10: 3
PAINLEVEL_OUTOF10: 0 - NO PAIN
PAINLEVEL_OUTOF10: 0 - NO PAIN

## 2024-07-08 ASSESSMENT — PAIN - FUNCTIONAL ASSESSMENT
PAIN_FUNCTIONAL_ASSESSMENT: 0-10

## 2024-07-08 NOTE — PROGRESS NOTES
RENAL DOSING ADJUSTMENT    Renal dose adjustments for the following medications have been made:  Current order cefepime 1g Q8h changed to cefepime 2g Q8H      Results from last 72 hours   Lab Units 07/08/24  0438 07/07/24  0645 07/06/24  0633   CREATININE mg/dL 0.94 0.91 0.93   BUN mg/dL 10 10 13     Serum creatinine: 0.94 mg/dL 07/08/24 0438  Estimated creatinine clearance: 125 mL/min

## 2024-07-08 NOTE — PROGRESS NOTES
"Nato Downs is a 53 y.o. male on day 2 of admission presenting with Acute cholecystitis.    Subjective   Patient is pleasant, he is up in the chair, he had tolerated liquids and is advancing his diet to fulls per surgery, denies pain, no nausea or vomiting, he has a SERENA drain in place.    Objective     Physical Exam  Vitals reviewed.   Constitutional:       General: He is awake. He is not in acute distress.     Appearance: Normal appearance. He is not toxic-appearing.      Comments: Poor dentition   HENT:      Head: Normocephalic and atraumatic.      Right Ear: External ear normal.      Left Ear: External ear normal.      Nose: Nose normal.   Eyes:      Extraocular Movements: Extraocular movements intact.   Cardiovascular:      Rate and Rhythm: Normal rate and regular rhythm.   Pulmonary:      Effort: Pulmonary effort is normal. No respiratory distress.   Abdominal:      General: There is no distension.      Palpations: Abdomen is soft.      Tenderness: There ar surgical dressings in place, c/d/I, SERENA drain with scant serosangnous fluid, there is a colostomy in the right lower quadrant with soft bile colored liquid in the basin  Musculoskeletal:         General: No signs of injury.      Cervical back: Normal range of motion.   Skin:     General: Skin is warm and dry.   Neurological:      General: No focal deficit present.      Mental Status: He is alert and oriented to person, place, and time. Mental status is at baseline.   Psychiatric:         Mood and Affect: Mood normal.         Behavior: Behavior normal. Behavior is cooperative.     Last Recorded Vitals  Blood pressure 122/71, pulse 86, temperature 36.5 °C (97.7 °F), temperature source Temporal, resp. rate 16, height 1.95 m (6' 4.77\"), weight 131 kg (289 lb 11 oz), SpO2 93%.  Intake/Output last 3 Shifts:  I/O last 3 completed shifts:  In: 3228.8 (24.6 mL/kg) [P.O.:300; I.V.:1978.8 (15.1 mL/kg); IV Piggyback:950]  Out: 3595 (27.4 mL/kg) [Urine:3250 (0.7 " mL/kg/hr); Drains:120; Stool:75; Blood:150]  Weight: 131.4 kg     Relevant Results  Scheduled medications  cefepime, 1 g, intravenous, q8h  clonazePAM, 0.5 mg, oral, BID  heparin (porcine), 5,000 Units, subcutaneous, q8h  ketorolac, 30 mg, intravenous, q6h TE  levETIRAcetam, 1,000 mg, oral, TID  metroNIDAZOLE, 500 mg, intravenous, Once  metroNIDAZOLE, 500 mg, oral, q8h TE  OXcarbazepine, 600 mg, oral, BID  pantoprazole, 40 mg, intravenous, Daily  risperiDONE, 1.5 mg, oral, BID      Continuous medications  lactated Ringer's, 125 mL/hr, Last Rate: 125 mL/hr (07/08/24 1025)      PRN medications  PRN medications: morphine, morphine, ondansetron ODT **OR** ondansetron    Results for orders placed or performed during the hospital encounter of 07/03/24 (from the past 24 hour(s))   VERIFY ABO/Rh Group Test   Result Value Ref Range    ABO TYPE A     Rh TYPE NEG    Type and screen   Result Value Ref Range    ABO TYPE A     Rh TYPE NEG     ANTIBODY SCREEN NEG    Magnesium   Result Value Ref Range    Magnesium 1.92 1.60 - 2.40 mg/dL   Comprehensive metabolic panel   Result Value Ref Range    Glucose 90 74 - 99 mg/dL    Sodium 136 136 - 145 mmol/L    Potassium 3.7 3.5 - 5.3 mmol/L    Chloride 102 98 - 107 mmol/L    Bicarbonate 25 21 - 32 mmol/L    Anion Gap 13 10 - 20 mmol/L    Urea Nitrogen 10 6 - 23 mg/dL    Creatinine 0.94 0.50 - 1.30 mg/dL    eGFR >90 >60 mL/min/1.73m*2    Calcium 7.9 (L) 8.6 - 10.3 mg/dL    Albumin 3.3 (L) 3.4 - 5.0 g/dL    Alkaline Phosphatase 126 (H) 33 - 120 U/L    Total Protein 5.8 (L) 6.4 - 8.2 g/dL    AST 66 (H) 9 - 39 U/L    Bilirubin, Total 0.7 0.0 - 1.2 mg/dL     (H) 10 - 52 U/L   CBC   Result Value Ref Range    WBC 16.2 (H) 4.4 - 11.3 x10*3/uL    nRBC 0.0 0.0 - 0.0 /100 WBCs    RBC 3.89 (L) 4.50 - 5.90 x10*6/uL    Hemoglobin 10.8 (L) 13.5 - 17.5 g/dL    Hematocrit 34.5 (L) 41.0 - 52.0 %    MCV 89 80 - 100 fL    MCH 27.8 26.0 - 34.0 pg    MCHC 31.3 (L) 32.0 - 36.0 g/dL    RDW 14.6 (H) 11.5  - 14.5 %    Platelets 307 150 - 450 x10*3/uL   Lavender Top   Result Value Ref Range    Extra Tube Hold for add-ons.      US gallbladder    Result Date: 7/4/2024  Interpreted By:  Elías Lucero, STUDY: US GALLBLADDER;  7/4/2024 3:02 pm   INDICATION: Signs/Symptoms:Evaluation of cholecystitis.   COMPARISON: None.   ACCESSION NUMBER(S): [id number]   ORDERING CLINICIAN: ELVER CRAFT   TECHNIQUE: Multiple images of the right upper quadrant were obtained.   FINDINGS: The study is limited due to patient's postprandial status and inability to hold his breath.   Visualized liver parenchyma shows increased echogenicity. There are multiple stones in the gallbladder. Gallbladder wall thickness could not be accurately measured due to incomplete distention no pericholecystic fluid is noted. Sonographic Pendleton's sign has been reported as negative. Common bile duct measures 0.7 cm in diameter. The pancreas is obscured by the overlying bowel gas. Visualized portal vein and right kidney are unremarkable. There is no free fluid in the Moreno's pouch.       Limited study.   Cholelithiasis. HIDA scan may be obtained if clinical suspicion for acute cholecystitis is high.   Minimal CBD dilatation, which may be age-appropriate. However, MRCP is recommended to rule out distal choledocholithiasis if clinically indicated.   Diffuse hepatic steatosis   Signed by: Elías Lucero 7/4/2024 4:43 PM Dictation workstation:   [id number]    CT abdomen pelvis wo IV contrast    Result Date: 7/3/2024  STUDY: CT Abdomen and Pelvis without IV Contrast; 07/03/2024 9:07 pm INDICATION: Abdominal pain.  Elevated liver enzymes. COMPARISON: CT A/P 06/20/2022. ACCESSION NUMBER(S): [id number] ORDERING CLINICIAN: KEYANA LEI TECHNIQUE: CT of the abdomen and pelvis was performed.  Contiguous axial images were obtained at 3 mm slice thickness through the abdomen and pelvis. Coronal and sagittal reconstructions at 3 mm slice thickness were performed. No  intravenous contrast was administered.  Automated mA/kV exposure control was utilized and patient examination was performed in strict accordance with principles of ALARA. FINDINGS: Please note that the evaluation of vessels, lymph nodes and organs is limited without intravenous contrast.  LOWER CHEST: No cardiomegaly.  There is very small amount pericardial fluid..  Lung bases reveals chronic appearing lower lobe atelectasis or scarring.  ABDOMEN:  LIVER: No hepatomegaly.  Smooth surface contour.  Normal attenuation.  BILE DUCTS: No intrahepatic or extrahepatic biliary ductal dilatation.  GALLBLADDER: Inflammatory changes are demonstrated surrounding the gallbladder. There does appear to be component of underlying stones or sludge. STOMACH: No abnormalities identified.  PANCREAS: No masses or ductal dilatation.  SPLEEN: No splenomegaly or focal splenic lesion.  ADRENAL GLANDS: No thickening or nodules.  KIDNEYS AND URETERS: Kidneys are normal in size and location.  Renal cysts are similar as compared to 2022 there is a very small 5 mm hyperdense right renal cyst..  PELVIS:  BLADDER: Suprapubic catheter is demonstrated. REPRODUCTIVE ORGANS: No abnormalities identified.  BOWEL: No abnormalities identified.  There is somewhat of a rectal impaction appearance.  VESSELS: No abnormalities identified.  Abdominal aorta is normal in caliber.  PERITONEUM/RETROPERITONEUM/LYMPH NODES: No free fluid.  No pneumoperitoneum. No lymphadenopathy.  ABDOMINAL WALL: No abnormalities identified. SOFT TISSUES: No abnormalities identified.  BONES: No acute fracture or aggressive osseous lesion.    1.Inflammatory changes surrounding the gallbladder with underlying stones or sludge. Correlate with ultrasound. 2.Suggestion of mild Rectal impaction. 3.Small amount pericardial fluid. Signed by Gokul Rodriguez DO         Assessment/Plan   Principal Problem:    Acute cholecystitis   vs choledochitis vs cholelithiasis     History of psychosis,  mood disorder  History epilepsy  History of auditory hallucinations  History of neurogenic bladder  History of colostomy    Plan:  - Patient stable to remain at inpatient level of care in current location  - Appreciate surgical consultation, will continue with advancing his diet to fulls per their recommendation  - Continue antibiotics as is for another day, will likely stop with source control  - In the meantime, we will trend metabolic panel including hepatic functions    Enoxaparin for VTE prophylaxis to be resumed once okay'd by surgery consultant, held since 7/5/24  Code: Full           I spent 45 minutes in the professional and overall care of this patient.    Lake Means MD  [facility]  Internal Medicine    This document was generated in whole or in part using the Dragon One medical voice recognition software and there may be some incorrect words/wording, spelling, or punctuation errors that were not corrected prior to finalization in the medical record.

## 2024-07-08 NOTE — PROGRESS NOTES
"Physical Therapy    Physical Therapy Evaluation    Patient Name: Nato Downs  MRN: [id number]  Today's Date: 7/8/2024   Time Calculation  Start Time: 0914  Stop Time: 0929  Time Calculation (min): 15 min    Assessment/Plan   PT Assessment  PT Assessment Results: Decreased strength, Decreased range of motion, Decreased endurance, Impaired balance, Decreased mobility, Decreased cognition, Impaired judgement, Decreased safety awareness, Obesity, Pain  Rehab Prognosis: Good  Evaluation/Treatment Tolerance: Patient tolerated treatment well  Medical Staff Made Aware: Yes  End of Session Communication: Bedside nurse  Assessment Comment: Pt's impairments include generalized weakness, impaired balance and decreased activity tolerance. Pt's functional limitations include bed mobility, transfers, gait and elevations. Pt would benefit from continued acute care PT during hospital LOS and upon discharge at a low vs. moderate level intensity pending hospital stay.  End of Session Patient Position: Up in chair, Alarm on  IP OR SWING BED PT PLAN  Inpatient or Swing Bed: Inpatient  PT Plan  Treatment/Interventions: Bed mobility, Transfer training, Gait training, Stair training, Balance training, Neuromuscular re-education, Strengthening, Endurance training, Range of motion, Therapeutic exercise, Therapeutic activity, Home exercise program, Positioning, Postural re-education  PT Plan: Ongoing PT  PT Frequency: 3 times per week  PT Discharge Recommendations: Low intensity level of continued care, Moderate intensity level of continued care  Equipment Recommended upon Discharge: Wheeled walker  PT Recommended Transfer Status: Assist x2, Assistive device  PT - OK to Discharge: Yes (Pt is ok to discharge from acute care PT to next level of care once cleared by medical team.)      Subjective   General Visit Information:  General  Reason for Referral: Per ED note, \"an 53 y.o. male with history including epilepsy, encephalopathy, psychosis, " "mood affective disorder presenting to the emergency department for jaundice and abdominal pain.  Per the facility patient has had abdominal pain intermittently.  He denies any abdominal pain at this time but does agree that he gets pain after eating.  They were also concerned from his facility that he looked a little jaundiced and sent him in for evaluation.  Patient cannot provide much further information in regards to this.  He denies any chest pain or shortness of breath, fevers, chills.\" Pt dx with acute cholecystitis and is now s/p open cholecystectomy 7/7.  Referred By: Lake Means MD  Past Medical History Relevant to Rehab:   Past Medical History:   Diagnosis Date    Auditory hallucinations     Encephalopathy     Epilepsy (Multi)     Neurogenic bladder     Neurogenic bowel     Other specified health status     No pertinent past medical history       Family/Caregiver Present: No  Co-Treatment: OT  Co-Treatment Reason: Safety; unknown baseline for mobility; pt from LT  Prior to Session Communication: Bedside nurse  Patient Position Received: Bed, 3 rail up, Alarm on  Preferred Learning Style: verbal, visual  General Comment: Pt is pleasant and agreeable to PT assessment. Pt follows all 1 step commands for mobility.  Home Living:  Home Living  Type of Home: Long Term Care facility ([facility] facility)  Prior Level of Function:  Prior Function Per Pt/Caregiver Report  Prior Function Comments: Per EMR, pt is a 1 person assist at baseline with walker for mobility. Pt reports he typically gets up to wheelchair and can mobilize in wheelchair. Anticipate pt receives assist for ADLs at LTC facility.  Precautions:  Precautions  Medical Precautions: Fall precautions    Objective   Pain:  Pain Assessment  Pain Assessment: 0-10  0-10 (Numeric) Pain Score: 0 - No pain  Cognition:  Cognition  Orientation Level: Disoriented to time (Disoriented to exact date. Pt reports July 2024.)    General Assessments:    Activity " "Tolerance  Endurance: Endurance does not limit participation in activity    Sensation  Light Touch: No apparent deficits    Static Sitting Balance  Static Sitting-Comment/Number of Minutes: SBA with feet supported; midline posture.  Dynamic Sitting Balance  Dynamic Sitting-Comments: MinAx2 to scoot fowards, cues for hand placement and initiation to scoot. Pt initially states, \"I can't\".    Static Standing Balance  Static Standing-Comment/Number of Minutes: MinAx2 with FWW in standing, WBOS.  Dynamic Standing Balance  Dynamic Standing-Comments: MinAx2 with FWW to amb bed>chair with WBOS.  Functional Assessments:  Bed Mobility  Bed Mobility: Yes  Bed Mobility 1  Bed Mobility 1: Supine to sitting  Level of Assistance 1: Maximum assistance, +2  Bed Mobility Comments 1: HOB raised. Repetitive cues for initiation and sequencing. Pt requesting assistance. Pt assisted at trunk and draw sheet to transition to sitting. Pt denies dizziness sitting at EOB.    Transfers  Transfer: Yes  Transfer 1  Technique 1: Sit to stand  Transfer Device 1: Walker, Gait belt  Transfer Level of Assistance 1: Moderate assistance, +2  Trials/Comments 1: x1 trial Sit>stand from EOB. Bed height raised. ModAx2 with FWW. Cues for hand placement and anterior weight shift to rise to stand. Pt denies dizziness in standing.  Transfers 2  Transfer From 2: Bed to  Transfer to 2: Chair with arms  Technique 2: To left  Transfer Device 2: Walker, Gait belt  Transfer Level of Assistance 2: Minimum assistance, +2  Trials/Comments 2: Pt amb 1x3' bed>chair on L MinAx2 with FWW. Lateral steps to approach chair. WBOS observed. Cues for sequencing steps and safety with device management. Cues for hand placement and controlled descent to chair. Chair alarm in place. Nursing updated on mobility.       Extremity/Trunk Assessments:  RLE   RLE : Exceptions to WFL  Strength RLE  R Hip Flexion: 2/5 (When asked to lift his leg pt responds, \"I can't\" but does try. RLE hip " flexion appears weaker than LLE for SLR. Attempted MMT again once pt is in chair. Hip flex appeared equal in chair. Pt has difficulty following mult-step commands for resistance.)  R Knee Extension: 3/5  R Ankle Dorsiflexion: 3+/5  LLE   LLE : Exceptions to WFL  Strength LLE  L Hip Flexion: 2/5  L Knee Extension: 3/5  L Ankle Dorsiflexion: 3+/5  Outcome Measures:  [facility] Basic Mobility  Turning from your back to your side while in a flat bed without using bedrails: A lot  Moving from lying on your back to sitting on the side of a flat bed without using bedrails: Total  Moving to and from bed to chair (including a wheelchair): A lot  Standing up from a chair using your arms (e.g. wheelchair or bedside chair): A lot  To walk in hospital room: A lot  Climbing 3-5 steps with railing: Total  Basic Mobility - Total Score: 10    Encounter Problems       Encounter Problems (Active)       PT Problem       Bed mobility (Progressing)       Start:  07/08/24    Expected End:  07/22/24       Pt will perform supine<>sit with HOB flat, MinAx1.           Transfers (Progressing)       Start:  07/08/24    Expected End:  07/22/24       Pt will perform all transfers with LRAD, MinAx1.            Gait (Progressing)       Start:  07/08/24    Expected End:  07/22/24       Pt will amb 50' with LRAD, MinAx1 with wheelchair follow, with reciprocal gait, upright posture and improved activity tolerance as demonstrated by vitals.           Balance (Progressing)       Start:  07/08/24    Expected End:  07/22/24       Pt will tolerate standing x2 min with BUE support, CGAx1.         Strength (Progressing)       Start:  07/08/24    Expected End:  07/22/24       Pt will improve gross BLE strength 3+/5.            Pain - Adult              Education Documentation  Mobility Training, taught by Mirella Ferguson, PT at 7/8/2024  9:53 AM.  Learner: Patient  Readiness: Acceptance  Method: Explanation  Response: Verbalizes Understanding, Demonstrated  Understanding    Education Comments  No comments found.

## 2024-07-08 NOTE — PROGRESS NOTES
"Occupational Therapy  Evaluation    Patient Name: Nato Downs  MRN: [id number]  Today's Date: 7/8/2024  Time Calculation  Start Time: 0913  Stop Time: 0928  Time Calculation (min): 15 min  4108/4108-A    Assessment  IP OT Assessment  OT Assessment: Patient demonstrates decreased independence with self care, decreased independence with functional transfers, decreased balance and decreased endurance.  Patient will benefit from skilled OT to address deficits.  Anticipate patient return to prior level of living with  moderate intensity therapy.  Prognosis: Good  Barriers to Discharge: None  Evaluation/Treatment Tolerance: Patient tolerated treatment well  Medical Staff Made Aware: Yes  End of Session Communication: Bedside nurse  End of Session Patient Position: Up in chair, Alarm on    Plan:  Treatment Interventions: ADL retraining, Functional transfer training, Endurance training, Patient/family training, Cognitive reorientation  OT Frequency: 3 times per week  OT Discharge Recommendations: Moderate intensity level of continued care  Equipment Recommended upon Discharge: Wheeled walker  OT Recommended Transfer Status: Minimal assist, Moderate assist, Assist of 2  OT - OK to Discharge: Yes (to next level of care when medically cleared by physician)    Subjective     Current Problem:  1. Acute cholecystitis  Case Request Operating Room: Cholecystectomy Laparoscopy with Cholangiogram    Case Request Operating Room: Cholecystectomy Laparoscopy with Cholangiogram    Surgical Pathology Exam    Surgical Pathology Exam          General:  General  Reason for Referral: Per ED note, \"an 53 y.o. male with history including epilepsy, encephalopathy, psychosis, mood affective disorder presenting to the emergency department for jaundice and abdominal pain.  Per the facility patient has had abdominal pain intermittently.  He denies any abdominal pain at this time but does agree that he gets pain after eating.  They were also " "concerned from his facility that he looked a little jaundiced and sent him in for evaluation.  Patient cannot provide much further information in regards to this.  He denies any chest pain or shortness of breath, fevers, chills.\" Pt dx with acute cholecystitis and is now s/p open cholecystectomy 7/7.  Referred By: aLke Means MD  Family/Caregiver Present: No  Co-Treatment: PT  Co-Treatment Reason: Safety; unknown baseline for mobility; pt from LT  Prior to Session Communication: Bedside nurse  Patient Position Received: Bed, 3 rail up, Alarm on  Preferred Learning Style: verbal, visual  General Comment: Pt is pleasant and agreeable to OT assessment. Pt follows all 1 step commands for mobility.    Precautions:  Medical Precautions: Fall precautions      Pain:  Pain Assessment  Pain Assessment: 0-10  0-10 (Numeric) Pain Score: 0 - No pain    Objective     Cognition:  Orientation Level: Disoriented to time (Disoriented to exact date. Pt reports July 2024.)        Home Living:  Type of Home: Long Term Care facility ([facility] facility)     Prior Function:  Prior Function Comments: Per EMR, pt is a 1 person assist at baseline with walker for mobility. Pt reports he typically gets up to wheelchair and can mobilize in wheelchair. Anticipate pt receives assist for ADLs at LTC facility.    ADL:  LE Dressing Assistance: Maximal    Activity Tolerance:  Endurance: Endurance does not limit participation in activity    Bed Mobility/Transfers:   Bed Mobility  Bed Mobility: Yes  Bed Mobility 1  Bed Mobility 1: Supine to sitting  Level of Assistance 1: Maximum assistance, +2  Bed Mobility Comments 1: HOB raised, cues for initiation and sequencing.  Transfers  Transfer: Yes  Transfer 1  Technique 1: Sit to stand  Transfer Device 1: Walker, Gait belt  Transfer Level of Assistance 1: Moderate assistance, +2  Trials/Comments 1: x1 trial Sit>stand from EOB. Bed height raised. ModAx2 with FWW. Cues for hand placement and anterior " weight shift to rise to stand. Pt denies dizziness in standing.  Transfers 2  Transfer From 2: Bed to  Transfer to 2: Chair with arms  Technique 2: To left  Transfer Device 2: Walker, Gait belt  Transfer Level of Assistance 2: Minimum assistance, +2  Trials/Comments 2: Pt amb 1x3' bed>chair on L MinAx2 with FWW. Lateral steps to approach chair. WBOS observed. Cues for sequencing steps and safety with device management. Cues for hand placement and controlled descent to chair. Chair alarm in place. Nursing updated on mobility.      Sensation:  Light Touch: No apparent deficits      Outcome Measures: [facility] Daily Activity  Putting on and taking off regular lower body clothing: A lot  Bathing (including washing, rinsing, drying): A lot  Putting on and taking off regular upper body clothing: A lot  Toileting, which includes using toilet, bedpan or urinal: A lot  Taking care of personal grooming such as brushing teeth: A little  Eating Meals: None  Daily Activity - Total Score: 15           EDUCATION:  Education  Individual(s) Educated: Patient  Education Provided: Fall precautons, Risk and benefits of OT discussed with patient or other  Plan of Care Discussed and Agreed Upon: yes  Patient Response to Education: Patient/Caregiver Verbalized Understanding of Information      Goals:   Encounter Problems       Encounter Problems (Active)       OT Goals       Patient will complete functional transfers with mod I. (Progressing)       Start:  07/08/24    Expected End:  07/22/24            Patient will complete toileting with min A. (Progressing)       Start:  07/08/24    Expected End:  07/22/24            Patient will complete LE dressing with min A. (Progressing)       Start:  07/08/24    Expected End:  07/22/24

## 2024-07-08 NOTE — PROGRESS NOTES
"Subjective  Pt sitting up in bed in NAD. S/p open cholecystectomy with SERENA placement.  IOC unsuccessful.  Liver enzymes downtrending with bilirubin down to 0.7.  Patient discomfort.  Leukocytosis increased to 16.2, likely reactive.  Plan for patient to have outpatient EUS for further evaluation of choledocholithiasis.     Objective  Blood pressure 122/71, pulse 86, temperature 36.5 °C (97.7 °F), temperature source Temporal, resp. rate 16, height 1.95 m (6' 4.77\"), weight 131 kg (289 lb 11 oz), SpO2 93%.    Physical Exam  Constitutional: Alert, pleasant and interactive, in NAD  Eyes: PERRL, sclera clear, no conjunctival injection  Skin: Warm and dry, no rash or ecchymosis  ENMT: Mucous membranes moist, no lesions noted  Resp: CTAB, even and unlabored  CV: RRR, normal S1, S2, no m,r,g  GI: +BS, soft, round, NT, no rebound tenderness or guarding, no palpable masses or organomegaly, abd dressing D&I, SERENA with serosanguinous output  Extremities: Extremities warm, no edema, contusions, wounds or cyanosis  Neuro: Alert and oriented x2  Psych: Appropriate mood and behavior    Medications  Scheduled medications  cefepime, 1 g, intravenous, q8h  clonazePAM, 0.5 mg, oral, BID  heparin (porcine), 5,000 Units, subcutaneous, q8h  ketorolac, 30 mg, intravenous, q6h TE  levETIRAcetam, 1,000 mg, oral, TID  metroNIDAZOLE, 500 mg, intravenous, Once  metroNIDAZOLE, 500 mg, oral, q8h TE  OXcarbazepine, 600 mg, oral, BID  pantoprazole, 40 mg, intravenous, Daily  risperiDONE, 1.5 mg, oral, BID      Continuous medications  lactated Ringer's, 125 mL/hr, Last Rate: 125 mL/hr (07/07/24 2050)      PRN medications  PRN medications: morphine, morphine, ondansetron ODT **OR** ondansetron     Labs  Lab Results   Component Value Date    WBC 16.2 (H) 07/08/2024    HGB 10.8 (L) 07/08/2024    HCT 34.5 (L) 07/08/2024    MCV 89 07/08/2024     07/08/2024     Lab Results   Component Value Date    GLUCOSE 90 07/08/2024    CALCIUM 7.9 (L) 07/08/2024 " "    07/08/2024    K 3.7 07/08/2024    CO2 25 07/08/2024     07/08/2024    BUN 10 07/08/2024    CREATININE 0.94 07/08/2024     Lab Results   Component Value Date     (H) 07/08/2024    AST 66 (H) 07/08/2024    ALKPHOS 126 (H) 07/08/2024    BILITOT 0.7 07/08/2024     No results found for: \"IRON\", \"TIBC\", \"FERRITIN\"  Lab Results   Component Value Date    INR 1.0 12/20/2023    INR 1.1 11/21/2023    INR 1.0 04/09/2019    PROTIME 11.5 12/20/2023    PROTIME 12.3 11/21/2023    PROTIME 11.6 04/09/2019       Radiology  HIDA scan 7/5/24 noting:  Impression:     1. Nonvisualization of gallbladder by 2 hours after radiotracer  injection on both planar and SPECT CT, suggestive of cystic duct  obstruction and acute cholecystitis.      I personally reviewed the images/study. This study was interpreted at  [place], [place].      MACRO:  Critical Finding:  See findings. Notification was initiated on  7/5/2024 at 5:25 pm by Dr. Polina Arroyo to Dr Lake Means .  (**-YCF-**)      Signed by: Polina Arroyo 7/5/2024 5:32 PM  Dictation workstation:   [id number]     US gallbladder 7/4/2024 noting:  Impression:     Limited study.      Cholelithiasis. HIDA scan may be obtained if clinical suspicion for  acute cholecystitis is high.      Minimal CBD dilatation, which may be age-appropriate. However, MRCP  is recommended to rule out distal choledocholithiasis if clinically  indicated.      Diffuse hepatic steatosis      Signed by: Elías Lucero 7/4/2024 4:43 PM  Dictation workstation:   [id number]     CT A/P without IV contrast on 7/3/2024 noting:  Impression:     1.Inflammatory changes surrounding the gallbladder with  underlying stones or sludge. Correlate with ultrasound.  2.Suggestion of mild Rectal impaction.  3.Small amount pericardial fluid.  Signed by Gokul Rodriguez, DO         Assessment  Nato Downs is a 53 y.o. male presenting with jaundice and abdominal pain.  EM " mild neurogenic bladder with suprapubic catheter, ileostomy.  Imaging consistent with colitis suspicious and cystic duct obstruction.  Patient underwent open cholecystectomy with Dr. Sapp 7/7/2024.  IOC unsuccessful.  Patient with no complaints of abdominal pain or nausea.  Tolerating clear liquids.  Liver enzymes and bilirubin downtrending.     # jaundice  # abdominal pain- resolved  # transaminitis/ hyperbilirubinemia- donwtrending  # cholecystitis- s/p open cholecystectomy  # ileostomy    Plan:  - continue supportive care  - advance diet as tolerated per surgery recs  - continue to trend liver enzymes daily  - continue analgesics and antiemetics as needed  - pt will be scheduled for outpatient EUS/+/-ERCP- office to arrange    Thank you for allowing us to participate in care. Please call with any further questions or concerns.      Plan has been discussed with Dr. Rodriguez. GI will sign off.     CHARLA Raymond/CNP

## 2024-07-08 NOTE — CONSULTS
"  Wound Care Consult     Visit Date: 7/7/2024      Patient Name: Nato Downs         MRN: [id number]           YOB: 1970     Reason for Consult: Ileostomy        Wound History: Present on admission     Pertinent Labs:   Albumin   Date Value Ref Range Status   07/07/2024 3.3 (L) 3.4 - 5.0 g/dL Final       Wound Assessment:  Wound 07/07/24 Incision Abdomen Right;Upper (Active)   Margins Attached edges 07/07/24 1355   Closure Approximated 07/07/24 1355   Sutures/Staple Line Approximated 07/07/24 1355   Dressing ABD 07/07/24 1600   Dressing Status Old drainage;Clean;Dry 07/07/24 1600       Wound Team Summary Assessment: Pt has a urostomy pouch in place. Per his nurse, stool has been liquid, so that is why urostomy pouch is in place. Stoma is prolapsed 4.5 cm, measures 2\" (3-9:00) x 1 3/8\" (12-6:00). Freida stomal skin intact, though did have some dry stool which I removed. Stoma is beefy red, moist.     Wound Team Plan: Pt placed in high out put pouch, with moldable ring and barrier extenders. Will continue to follow.     Geovanna Haines RN  7/7/2024  8:57 PM        "

## 2024-07-08 NOTE — PROGRESS NOTES
Nato BRAVO [place]  [id number]   1970       Subjective/      Today is the first postoperative day after open cholecystectomy    Patient stable overnight.  Has minimal complaints this morning.  Nursing staff reports he taking liquids                  Heart Rate:  [82-99]   Temp:  [36 °C (96.8 °F)-36.9 °C (98.4 °F)]   Resp:  [12-18]   BP: (112-140)/(56-85)   SpO2:  [91 %-98 %]     Intake/Output Summary (Last 24 hours) at 7/8/2024 0908  Last data filed at 7/8/2024 0400  Gross per 24 hour   Intake 2450 ml   Output 2470 ml   Net -20 ml          Physical Exam  Constitutional:       Comments: Appears comfortable   Eyes:      Comments: Sclera clear   Abdominal:      Comments: Dressings intact    Drain serosanguineous    Nontender                Results for orders placed or performed during the hospital encounter of 07/03/24 (from the past 24 hour(s))   VERIFY ABO/Rh Group Test   Result Value Ref Range    ABO TYPE A     Rh TYPE NEG    Type and screen   Result Value Ref Range    ABO TYPE A     Rh TYPE NEG     ANTIBODY SCREEN NEG    Magnesium   Result Value Ref Range    Magnesium 1.92 1.60 - 2.40 mg/dL   Comprehensive metabolic panel   Result Value Ref Range    Glucose 90 74 - 99 mg/dL    Sodium 136 136 - 145 mmol/L    Potassium 3.7 3.5 - 5.3 mmol/L    Chloride 102 98 - 107 mmol/L    Bicarbonate 25 21 - 32 mmol/L    Anion Gap 13 10 - 20 mmol/L    Urea Nitrogen 10 6 - 23 mg/dL    Creatinine 0.94 0.50 - 1.30 mg/dL    eGFR >90 >60 mL/min/1.73m*2    Calcium 7.9 (L) 8.6 - 10.3 mg/dL    Albumin 3.3 (L) 3.4 - 5.0 g/dL    Alkaline Phosphatase 126 (H) 33 - 120 U/L    Total Protein 5.8 (L) 6.4 - 8.2 g/dL    AST 66 (H) 9 - 39 U/L    Bilirubin, Total 0.7 0.0 - 1.2 mg/dL     (H) 10 - 52 U/L   CBC   Result Value Ref Range    WBC 16.2 (H) 4.4 - 11.3 x10*3/uL    nRBC 0.0 0.0 - 0.0 /100 WBCs    RBC 3.89 (L) 4.50 - 5.90 x10*6/uL    Hemoglobin 10.8 (L) 13.5 - 17.5 g/dL    Hematocrit 34.5 (L) 41.0 - 52.0 %    MCV 89 80 - 100 fL    MCH 27.8  26.0 - 34.0 pg    MCHC 31.3 (L) 32.0 - 36.0 g/dL    RDW 14.6 (H) 11.5 - 14.5 %    Platelets 307 150 - 450 x10*3/uL   Lavender Top   Result Value Ref Range    Extra Tube Hold for add-ons.         I reviewed the above studies      NM hepatobiliary    Result Date: 7/5/2024  Interpreted By:  Polina Arroyo, STUDY: NM HEPATOBILIARY; 7/5/2024 4:12 pm   INDICATION: Signs/Symptoms:Inconclusive RUQ US, RUQ tenderness.   COMPARISON: CT of abdomen and pelvis on 07/03/2024.   ACCESSION NUMBER(S): [id number]   ORDERING CLINICIAN: ELVER CRAFT   TECHNIQUE: DIVISION OF NUCLEAR MEDICINE HEPATOBILIARY SCAN (HIDA)   The patient received an intravenous dose of 4.9 mCi of Tc-99m mebrofenin (Choletec).  Sequential images of the upper abdomen were then acquired over the next 120 minutes.   FINDINGS: There is prompt accumulation of activity within the liver and normal subsequent excretion via the biliary ductal system into the small bowel. Nonvisualization of gallbladder by 2 hours after radiotracer injection on both planar and SPECT CT suggestive of cystic duct obstruction and acute cholecystitis.       1. Nonvisualization of gallbladder by 2 hours after radiotracer injection on both planar and SPECT CT, suggestive of cystic duct obstruction and acute cholecystitis.   I personally reviewed the images/study. This study was interpreted at [place], [place].   MACRO: Critical Finding:  See findings. Notification was initiated on 7/5/2024 at 5:25 pm by Dr. Polina Arroyo to Dr Lake Means .  (**-YCF-**)     Signed by: Polina Arroyo 7/5/2024 5:32 PM Dictation workstation:   [id number]    US gallbladder    Result Date: 7/4/2024  Interpreted By:  Elías Lucero, STUDY: US GALLBLADDER;  7/4/2024 3:02 pm   INDICATION: Signs/Symptoms:Evaluation of cholecystitis.   COMPARISON: None.   ACCESSION NUMBER(S): [id number]   ORDERING CLINICIAN: ELVER CRAFT   TECHNIQUE: Multiple images of the right upper quadrant were  obtained.   FINDINGS: The study is limited due to patient's postprandial status and inability to hold his breath.   Visualized liver parenchyma shows increased echogenicity. There are multiple stones in the gallbladder. Gallbladder wall thickness could not be accurately measured due to incomplete distention no pericholecystic fluid is noted. Sonographic Pendleton's sign has been reported as negative. Common bile duct measures 0.7 cm in diameter. The pancreas is obscured by the overlying bowel gas. Visualized portal vein and right kidney are unremarkable. There is no free fluid in the Moreno's pouch.       Limited study.   Cholelithiasis. HIDA scan may be obtained if clinical suspicion for acute cholecystitis is high.   Minimal CBD dilatation, which may be age-appropriate. However, MRCP is recommended to rule out distal choledocholithiasis if clinically indicated.   Diffuse hepatic steatosis   Signed by: Elías Lucero 7/4/2024 4:43 PM Dictation workstation:   [id number]    CT abdomen pelvis wo IV contrast    Result Date: 7/3/2024  STUDY: CT Abdomen and Pelvis without IV Contrast; 07/03/2024 9:07 pm INDICATION: Abdominal pain.  Elevated liver enzymes. COMPARISON: CT A/P 06/20/2022. ACCESSION NUMBER(S): [id number] ORDERING CLINICIAN: KEYANA LEI TECHNIQUE: CT of the abdomen and pelvis was performed.  Contiguous axial images were obtained at 3 mm slice thickness through the abdomen and pelvis. Coronal and sagittal reconstructions at 3 mm slice thickness were performed. No intravenous contrast was administered.  Automated mA/kV exposure control was utilized and patient examination was performed in strict accordance with principles of ALARA. FINDINGS: Please note that the evaluation of vessels, lymph nodes and organs is limited without intravenous contrast.  LOWER CHEST: No cardiomegaly.  There is very small amount pericardial fluid..  Lung bases reveals chronic appearing lower lobe atelectasis or scarring.   ABDOMEN:  LIVER: No hepatomegaly.  Smooth surface contour.  Normal attenuation.  BILE DUCTS: No intrahepatic or extrahepatic biliary ductal dilatation.  GALLBLADDER: Inflammatory changes are demonstrated surrounding the gallbladder. There does appear to be component of underlying stones or sludge. STOMACH: No abnormalities identified.  PANCREAS: No masses or ductal dilatation.  SPLEEN: No splenomegaly or focal splenic lesion.  ADRENAL GLANDS: No thickening or nodules.  KIDNEYS AND URETERS: Kidneys are normal in size and location.  Renal cysts are similar as compared to 2022 there is a very small 5 mm hyperdense right renal cyst..  PELVIS:  BLADDER: Suprapubic catheter is demonstrated. REPRODUCTIVE ORGANS: No abnormalities identified.  BOWEL: No abnormalities identified.  There is somewhat of a rectal impaction appearance.  VESSELS: No abnormalities identified.  Abdominal aorta is normal in caliber.  PERITONEUM/RETROPERITONEUM/LYMPH NODES: No free fluid.  No pneumoperitoneum. No lymphadenopathy.  ABDOMINAL WALL: No abnormalities identified. SOFT TISSUES: No abnormalities identified.  BONES: No acute fracture or aggressive osseous lesion.    1.Inflammatory changes surrounding the gallbladder with underlying stones or sludge. Correlate with ultrasound. 2.Suggestion of mild Rectal impaction. 3.Small amount pericardial fluid. Signed by Gokul Rodriguez DO       I have reviewed the images and the reports        Assessment/    Stable postop      Plan/    Stable after open cholecystectomy.    Today's liver function test are normal.  Patient at risk for retained common bile duct stone.  Will need to be monitored for such in the long-term    Advance diet as tolerated.  Up in chair.  Discharge planning      Aidan Sapp MD

## 2024-07-08 NOTE — CARE PLAN
The clinical goals for the shift include pt will demonstrate comfort aeb sleeping in intervals of 3hours or greater    Over the shift, the patient did make progress toward the following goals.       Problem: Skin  Goal: Decreased wound size/increased tissue granulation at next dressing change  Outcome: Progressing  Goal: Participates in plan/prevention/treatment measures  Outcome: Progressing  Goal: Prevent/manage excess moisture  Outcome: Progressing  Goal: Prevent/minimize sheer/friction injuries  Outcome: Progressing  Goal: Promote/optimize nutrition  Outcome: Progressing  Goal: Promote skin healing  Outcome: Progressing     Problem: Fall/Injury  Goal: Not fall by end of shift  Outcome: Progressing  Goal: Be free from injury by end of the shift  Outcome: Progressing  Goal: Verbalize understanding of personal risk factors for fall in the hospital  Outcome: Progressing  Goal: Verbalize understanding of risk factor reduction measures to prevent injury from fall in the home  Outcome: Progressing  Goal: Use assistive devices by end of the shift  Outcome: Progressing  Goal: Pace activities to prevent fatigue by end of the shift  Outcome: Progressing     Problem: Pain  Goal: Takes deep breaths with improved pain control throughout the shift  Outcome: Progressing  Goal: Turns in bed with improved pain control throughout the shift  Outcome: Progressing  Goal: Walks with improved pain control throughout the shift  Outcome: Progressing  Goal: Performs ADL's with improved pain control throughout shift  Outcome: Progressing  Goal: Participates in PT with improved pain control throughout the shift  Outcome: Progressing  Goal: Free from opioid side effects throughout the shift  Outcome: Progressing  Goal: Free from acute confusion related to pain meds throughout the shift  Outcome: Progressing     Problem: Pain - Adult  Goal: Verbalizes/displays adequate comfort level or baseline comfort level  Outcome: Progressing      Problem: Safety - Adult  Goal: Free from fall injury  Outcome: Progressing     Problem: Discharge Planning  Goal: Discharge to home or other facility with appropriate resources  Outcome: Progressing     Problem: Chronic Conditions and Co-morbidities  Goal: Patient's chronic conditions and co-morbidity symptoms are monitored and maintained or improved  Outcome: Progressing

## 2024-07-09 LAB
ALBUMIN SERPL BCP-MCNC: 3 G/DL (ref 3.4–5)
ALP SERPL-CCNC: 112 U/L (ref 33–120)
ALT SERPL W P-5'-P-CCNC: 89 U/L (ref 10–52)
ANION GAP SERPL CALC-SCNC: 13 MMOL/L (ref 10–20)
AST SERPL W P-5'-P-CCNC: 39 U/L (ref 9–39)
BILIRUB SERPL-MCNC: 0.7 MG/DL (ref 0–1.2)
BUN SERPL-MCNC: 8 MG/DL (ref 6–23)
CALCIUM SERPL-MCNC: 8 MG/DL (ref 8.6–10.3)
CHLORIDE SERPL-SCNC: 99 MMOL/L (ref 98–107)
CO2 SERPL-SCNC: 26 MMOL/L (ref 21–32)
CREAT SERPL-MCNC: 0.93 MG/DL (ref 0.5–1.3)
EGFRCR SERPLBLD CKD-EPI 2021: >90 ML/MIN/1.73M*2
ERYTHROCYTE [DISTWIDTH] IN BLOOD BY AUTOMATED COUNT: 14.6 % (ref 11.5–14.5)
GLUCOSE SERPL-MCNC: 113 MG/DL (ref 74–99)
HCT VFR BLD AUTO: 34.5 % (ref 41–52)
HGB BLD-MCNC: 10.7 G/DL (ref 13.5–17.5)
MAGNESIUM SERPL-MCNC: 1.86 MG/DL (ref 1.6–2.4)
MCH RBC QN AUTO: 27.6 PG (ref 26–34)
MCHC RBC AUTO-ENTMCNC: 31 G/DL (ref 32–36)
MCV RBC AUTO: 89 FL (ref 80–100)
NRBC BLD-RTO: 0 /100 WBCS (ref 0–0)
PLATELET # BLD AUTO: 290 X10*3/UL (ref 150–450)
POTASSIUM SERPL-SCNC: 3.7 MMOL/L (ref 3.5–5.3)
PROT SERPL-MCNC: 5.5 G/DL (ref 6.4–8.2)
RBC # BLD AUTO: 3.87 X10*6/UL (ref 4.5–5.9)
SODIUM SERPL-SCNC: 134 MMOL/L (ref 136–145)
WBC # BLD AUTO: 13.8 X10*3/UL (ref 4.4–11.3)

## 2024-07-09 PROCEDURE — 1100000001 HC PRIVATE ROOM DAILY

## 2024-07-09 PROCEDURE — 2500000001 HC RX 250 WO HCPCS SELF ADMINISTERED DRUGS (ALT 637 FOR MEDICARE OP): Performed by: INTERNAL MEDICINE

## 2024-07-09 PROCEDURE — 80053 COMPREHEN METABOLIC PANEL: CPT | Performed by: INTERNAL MEDICINE

## 2024-07-09 PROCEDURE — 83735 ASSAY OF MAGNESIUM: CPT | Performed by: INTERNAL MEDICINE

## 2024-07-09 PROCEDURE — C9113 INJ PANTOPRAZOLE SODIUM, VIA: HCPCS | Performed by: SURGERY

## 2024-07-09 PROCEDURE — 85027 COMPLETE CBC AUTOMATED: CPT | Performed by: INTERNAL MEDICINE

## 2024-07-09 PROCEDURE — 36415 COLL VENOUS BLD VENIPUNCTURE: CPT | Performed by: INTERNAL MEDICINE

## 2024-07-09 PROCEDURE — 99232 SBSQ HOSP IP/OBS MODERATE 35: CPT | Performed by: INTERNAL MEDICINE

## 2024-07-09 PROCEDURE — 2500000005 HC RX 250 GENERAL PHARMACY W/O HCPCS: Performed by: INTERNAL MEDICINE

## 2024-07-09 PROCEDURE — 99024 POSTOP FOLLOW-UP VISIT: CPT | Performed by: SURGERY

## 2024-07-09 PROCEDURE — 2500000004 HC RX 250 GENERAL PHARMACY W/ HCPCS (ALT 636 FOR OP/ED): Performed by: SURGERY

## 2024-07-09 PROCEDURE — 97530 THERAPEUTIC ACTIVITIES: CPT | Mod: GP,CQ

## 2024-07-09 RX ORDER — PANTOPRAZOLE SODIUM 40 MG/1
40 TABLET, DELAYED RELEASE ORAL
Status: DISCONTINUED | OUTPATIENT
Start: 2024-07-10 | End: 2024-07-11 | Stop reason: HOSPADM

## 2024-07-09 ASSESSMENT — COGNITIVE AND FUNCTIONAL STATUS - GENERAL
STANDING UP FROM CHAIR USING ARMS: A LOT
DRESSING REGULAR UPPER BODY CLOTHING: A LOT
PERSONAL GROOMING: A LITTLE
WALKING IN HOSPITAL ROOM: A LOT
TURNING FROM BACK TO SIDE WHILE IN FLAT BAD: A LOT
MOVING TO AND FROM BED TO CHAIR: A LOT
DRESSING REGULAR LOWER BODY CLOTHING: A LOT
CLIMB 3 TO 5 STEPS WITH RAILING: A LOT
MOVING TO AND FROM BED TO CHAIR: A LOT
STANDING UP FROM CHAIR USING ARMS: A LOT
TURNING FROM BACK TO SIDE WHILE IN FLAT BAD: A LOT
CLIMB 3 TO 5 STEPS WITH RAILING: A LOT
MOBILITY SCORE: 12
WALKING IN HOSPITAL ROOM: A LOT
HELP NEEDED FOR BATHING: A LOT
MOVING FROM LYING ON BACK TO SITTING ON SIDE OF FLAT BED WITH BEDRAILS: A LOT
DAILY ACTIVITIY SCORE: 15
TOILETING: A LOT
MOBILITY SCORE: 12
MOVING FROM LYING ON BACK TO SITTING ON SIDE OF FLAT BED WITH BEDRAILS: A LOT

## 2024-07-09 ASSESSMENT — PAIN SCALES - GENERAL
PAINLEVEL_OUTOF10: 0 - NO PAIN

## 2024-07-09 ASSESSMENT — PAIN - FUNCTIONAL ASSESSMENT
PAIN_FUNCTIONAL_ASSESSMENT: 0-10
PAIN_FUNCTIONAL_ASSESSMENT: 0-10

## 2024-07-09 ASSESSMENT — ACTIVITIES OF DAILY LIVING (ADL): EFFECT OF PAIN ON DAILY ACTIVITIES: .

## 2024-07-09 NOTE — PROGRESS NOTES
Nato BRAVO [place]  [id number]   1970       Subjective/          Today is postoperative day 2 after laparoscopic converted to open cholecystectomy    Conversation carried out with nursing staff.  Patient took liquids well yesterday.  Was up in chair for several hours.  No obvious pain    This morning patient says he feels fine              Heart Rate:  [80-89]   Temp:  [36.4 °C (97.5 °F)-37.8 °C (100 °F)]   Resp:  [16-18]   BP: (103-138)/(63-78)   SpO2:  [91 %-97 %]     Intake/Output Summary (Last 24 hours) at 7/9/2024 0851  Last data filed at 7/9/2024 0044  Gross per 24 hour   Intake 3102.5 ml   Output 1330 ml   Net 1772.5 ml          Physical Exam  Abdominal:      Comments: Drain serosanguineous    Obese, soft    Some incisional tenderness, appropriate                Results for orders placed or performed during the hospital encounter of 07/03/24 (from the past 24 hour(s))   Magnesium   Result Value Ref Range    Magnesium 1.86 1.60 - 2.40 mg/dL   Comprehensive Metabolic Panel   Result Value Ref Range    Glucose 113 (H) 74 - 99 mg/dL    Sodium 134 (L) 136 - 145 mmol/L    Potassium 3.7 3.5 - 5.3 mmol/L    Chloride 99 98 - 107 mmol/L    Bicarbonate 26 21 - 32 mmol/L    Anion Gap 13 10 - 20 mmol/L    Urea Nitrogen 8 6 - 23 mg/dL    Creatinine 0.93 0.50 - 1.30 mg/dL    eGFR >90 >60 mL/min/1.73m*2    Calcium 8.0 (L) 8.6 - 10.3 mg/dL    Albumin 3.0 (L) 3.4 - 5.0 g/dL    Alkaline Phosphatase 112 33 - 120 U/L    Total Protein 5.5 (L) 6.4 - 8.2 g/dL    AST 39 9 - 39 U/L    Bilirubin, Total 0.7 0.0 - 1.2 mg/dL    ALT 89 (H) 10 - 52 U/L   CBC   Result Value Ref Range    WBC 13.8 (H) 4.4 - 11.3 x10*3/uL    nRBC 0.0 0.0 - 0.0 /100 WBCs    RBC 3.87 (L) 4.50 - 5.90 x10*6/uL    Hemoglobin 10.7 (L) 13.5 - 17.5 g/dL    Hematocrit 34.5 (L) 41.0 - 52.0 %    MCV 89 80 - 100 fL    MCH 27.6 26.0 - 34.0 pg    MCHC 31.0 (L) 32.0 - 36.0 g/dL    RDW 14.6 (H) 11.5 - 14.5 %    Platelets 290 150 - 450 x10*3/uL        I reviewed the above  studies      NM hepatobiliary    Result Date: 7/5/2024  Interpreted By:  Polina Arroyo, STUDY: NM HEPATOBILIARY; 7/5/2024 4:12 pm   INDICATION: Signs/Symptoms:Inconclusive RUQ US, RUQ tenderness.   COMPARISON: CT of abdomen and pelvis on 07/03/2024.   ACCESSION NUMBER(S): [id number]   ORDERING CLINICIAN: ELVER CRAFT   TECHNIQUE: DIVISION OF NUCLEAR MEDICINE HEPATOBILIARY SCAN (HIDA)   The patient received an intravenous dose of 4.9 mCi of Tc-99m mebrofenin (Choletec).  Sequential images of the upper abdomen were then acquired over the next 120 minutes.   FINDINGS: There is prompt accumulation of activity within the liver and normal subsequent excretion via the biliary ductal system into the small bowel. Nonvisualization of gallbladder by 2 hours after radiotracer injection on both planar and SPECT CT suggestive of cystic duct obstruction and acute cholecystitis.       1. Nonvisualization of gallbladder by 2 hours after radiotracer injection on both planar and SPECT CT, suggestive of cystic duct obstruction and acute cholecystitis.   I personally reviewed the images/study. This study was interpreted at [place], [place].   MACRO: Critical Finding:  See findings. Notification was initiated on 7/5/2024 at 5:25 pm by Dr. Polina Arroyo to Dr Lake Means .  (**-YCF-**)     Signed by: Polina Arroyo 7/5/2024 5:32 PM Dictation workstation:   [id number]    US gallbladder    Result Date: 7/4/2024  Interpreted By:  Elías Lucero, STUDY: US GALLBLADDER;  7/4/2024 3:02 pm   INDICATION: Signs/Symptoms:Evaluation of cholecystitis.   COMPARISON: None.   ACCESSION NUMBER(S): [id number]   ORDERING CLINICIAN: ELVER CRAFT   TECHNIQUE: Multiple images of the right upper quadrant were obtained.   FINDINGS: The study is limited due to patient's postprandial status and inability to hold his breath.   Visualized liver parenchyma shows increased echogenicity. There are multiple stones in the  gallbladder. Gallbladder wall thickness could not be accurately measured due to incomplete distention no pericholecystic fluid is noted. Sonographic Pendleton's sign has been reported as negative. Common bile duct measures 0.7 cm in diameter. The pancreas is obscured by the overlying bowel gas. Visualized portal vein and right kidney are unremarkable. There is no free fluid in the Moreno's pouch.       Limited study.   Cholelithiasis. HIDA scan may be obtained if clinical suspicion for acute cholecystitis is high.   Minimal CBD dilatation, which may be age-appropriate. However, MRCP is recommended to rule out distal choledocholithiasis if clinically indicated.   Diffuse hepatic steatosis   Signed by: Elías Lucero 7/4/2024 4:43 PM Dictation workstation:   [id number]    CT abdomen pelvis wo IV contrast    Result Date: 7/3/2024  STUDY: CT Abdomen and Pelvis without IV Contrast; 07/03/2024 9:07 pm INDICATION: Abdominal pain.  Elevated liver enzymes. COMPARISON: CT A/P 06/20/2022. ACCESSION NUMBER(S): [id number] ORDERING CLINICIAN: KEYANA LEI TECHNIQUE: CT of the abdomen and pelvis was performed.  Contiguous axial images were obtained at 3 mm slice thickness through the abdomen and pelvis. Coronal and sagittal reconstructions at 3 mm slice thickness were performed. No intravenous contrast was administered.  Automated mA/kV exposure control was utilized and patient examination was performed in strict accordance with principles of ALARA. FINDINGS: Please note that the evaluation of vessels, lymph nodes and organs is limited without intravenous contrast.  LOWER CHEST: No cardiomegaly.  There is very small amount pericardial fluid..  Lung bases reveals chronic appearing lower lobe atelectasis or scarring.  ABDOMEN:  LIVER: No hepatomegaly.  Smooth surface contour.  Normal attenuation.  BILE DUCTS: No intrahepatic or extrahepatic biliary ductal dilatation.  GALLBLADDER: Inflammatory changes are demonstrated  surrounding the gallbladder. There does appear to be component of underlying stones or sludge. STOMACH: No abnormalities identified.  PANCREAS: No masses or ductal dilatation.  SPLEEN: No splenomegaly or focal splenic lesion.  ADRENAL GLANDS: No thickening or nodules.  KIDNEYS AND URETERS: Kidneys are normal in size and location.  Renal cysts are similar as compared to 2022 there is a very small 5 mm hyperdense right renal cyst..  PELVIS:  BLADDER: Suprapubic catheter is demonstrated. REPRODUCTIVE ORGANS: No abnormalities identified.  BOWEL: No abnormalities identified.  There is somewhat of a rectal impaction appearance.  VESSELS: No abnormalities identified.  Abdominal aorta is normal in caliber.  PERITONEUM/RETROPERITONEUM/LYMPH NODES: No free fluid.  No pneumoperitoneum. No lymphadenopathy.  ABDOMINAL WALL: No abnormalities identified. SOFT TISSUES: No abnormalities identified.  BONES: No acute fracture or aggressive osseous lesion.    1.Inflammatory changes surrounding the gallbladder with underlying stones or sludge. Correlate with ultrasound. 2.Suggestion of mild Rectal impaction. 3.Small amount pericardial fluid. Signed by Gokul Rodriguez DO       I have reviewed the images and the reports        Assessment/    Stable after open cholecystectomy      Plan/    Liver function test normal.  Hemoglobin and hematocrit good.  Leukocytosis decreasing    Drain appropriate    Gradually advance diet.  Discharge planning.  Possible discharge Thursday      Aidan Sapp MD

## 2024-07-09 NOTE — PROGRESS NOTES
"Physical Therapy    Physical Therapy    Physical Therapy Treatment    Patient Name: Nato Downs  MRN: [id number]  Today's Date: 7/9/2024  Time Calculation  Start Time: 1258  Stop Time: 1321  Time Calculation (min): 23 min     4108/4108-A     07/09/24 1258   PT  Visit   PT Received On 07/09/24   Response to Previous Treatment Patient with no complaints from previous session.   General   Reason for Referral Per ED note, \"an 53 y.o. male with history including epilepsy, encephalopathy, psychosis, mood affective disorder presenting to the emergency department for jaundice and abdominal pain.  Per the facility patient has had abdominal pain intermittently.  He denies any abdominal pain at this time but does agree that he gets pain after eating.  They were also concerned from his facility that he looked a little jaundiced and sent him in for evaluation.  Patient cannot provide much further information in regards to this.  He denies any chest pain or shortness of breath, fevers, chills.\" Pt dx with acute cholecystitis and is now s/p open cholecystectomy 7/7.   Referred By Lake Means MD   Prior to Session Communication Bedside nurse   Patient Position Received Bed, 4 rail up;Alarm on   Preferred Learning Style verbal;visual   General Comment Pt is pleasant and agreeable to therapy, cleared for participation   Precautions   Medical Precautions Fall precautions   Pain Assessment   Pain Assessment 0-10   0-10 (Numeric) Pain Score 0 - No pain   Effect of Pain on Daily Activities .   Cognition   Orientation Level Disoriented to place;Disoriented to time;Disoriented to situation   Therapeutic Exercise   Therapeutic Exercise Performed Yes   Therapeutic Exercise Activity 1 AP/LAQ/marching/pillow squeeze/abd with manual resistance x10-15 with v/t cues to stay on task and complete   Bed Mobility   Bed Mobility Yes   Bed Mobility 1   Bed Mobility 1 Supine to sitting   Level of Assistance 1 Maximum assistance;+2;Moderate verbal " cues;Moderate tactile cues   Bed Mobility Comments 1 x2- HOB elevated, frequent cues for sequencing, log roll technique performed d/t ostomoy precautions. Cues to scoot toward EOB using B UE's, pt uses L UE on bed and uses therapist hand to assist on R side. Increased time and effort to complete.   Bed Mobility 2   Bed Mobility  2 Sitting to supine   Level of Assistance 2 Maximum assistance;Moderate verbal cues;Moderate tactile cues;+2   Ambulation/Gait Training   Ambulation/Gait Training Performed Yes   Ambulation/Gait Training 1   Surface 1 Level tile   Device 1 Rolling walker   Gait Support Devices Gait belt   Assistance 1 Moderate assistance;Moderate verbal cues   Quality of Gait 1 Diminished heel strike;Inconsistent stride length   Comments/Distance (ft) 1 7' chair follow, modAx1-2, pt pushes WW too far in front requiring cues for sequencing and to stay close to WW for increased safety, fair follow through.   Transfers   Transfer Yes   Transfer 1   Transfer From 1 Bed to   Transfer to 1 Stand;Sit;Chair with arms   Technique 1 Sit to stand;Stand to sit   Transfer Device 1 Walker;Gait belt   Transfer Level of Assistance 1 Moderate assistance;Moderate verbal cues;+2   Trials/Comments 1 Cues for hand placement, sequencing and safety with fair follow through.   Activity Tolerance   Endurance Tolerates 10 - 20 min exercise with multiple rests   PT Assessment   PT Assessment Results Decreased strength;Decreased endurance;Impaired balance;Decreased mobility   Rehab Prognosis Good   End of Session Communication Bedside nurse   Assessment Comment Pt appears to fatigue quickly asking to sit shortly into ambulation trial. Increased time and effort with mod v/t cues throughout for sequencing and safety with all activities,  fair follow through.   End of Session Patient Position Up in chair;Alarm on       Outcome Measures:  [facility] Basic Mobility  Turning from your back to your side while in a flat bed without using bedrails:  A lot  Moving from lying on your back to sitting on the side of a flat bed without using bedrails: A lot  Moving to and from bed to chair (including a wheelchair): A lot  Standing up from a chair using your arms (e.g. wheelchair or bedside chair): A lot  To walk in hospital room: A lot  Climbing 3-5 steps with railing: A lot  Basic Mobility - Total Score: 12                             EDUCATION:  Outpatient Education  Individual(s) Educated: Patient  Education Provided: Fall Risk  Education Documentation  Mobility Training, taught by Sally Deluca PTA at 7/9/2024  2:19 PM.  Learner: Patient  Readiness: Acceptance  Method: Explanation, Demonstration  Response: Verbalizes Understanding, Demonstrated Understanding, Needs Reinforcement    Education Comments  No comments found.        GOALS:  Encounter Problems       Encounter Problems (Active)       PT Problem       Bed mobility (Progressing)       Start:  07/08/24    Expected End:  07/22/24       Pt will perform supine<>sit with HOB flat, MinAx1.           Transfers (Progressing)       Start:  07/08/24    Expected End:  07/22/24       Pt will perform all transfers with LRAD, MinAx1.            Gait (Progressing)       Start:  07/08/24    Expected End:  07/22/24       Pt will amb 50' with LRAD, MinAx1 with wheelchair follow, with reciprocal gait, upright posture and improved activity tolerance as demonstrated by vitals.           Balance (Progressing)       Start:  07/08/24    Expected End:  07/22/24       Pt will tolerate standing x2 min with BUE support, CGAx1.         Strength (Progressing)       Start:  07/08/24    Expected End:  07/22/24       Pt will improve gross BLE strength 3+/5.            Pain - Adult

## 2024-07-09 NOTE — CONSULTS
"  Wound Care Consult     Visit Date: 7/9/2024      Patient Name: Nato Downs         MRN: [id number]           YOB: 1970     Reason for Consult: Ileostomy Care        Wound History:      Pertinent Labs:   Albumin   Date Value Ref Range Status   07/09/2024 3.0 (L) 3.4 - 5.0 g/dL Final       Wound Assessment:  Wound 07/07/24 Incision Abdomen Right;Upper (Active)   Site Assessment Unable to assess 07/09/24 0042   Freida-Wound Assessment Clean;Dry;Intact 07/09/24 0042   Margins Attached edges 07/07/24 1355   Closure Approximated 07/07/24 1355   Sutures/Staple Line Approximated 07/07/24 1355   Drainage Description None 07/09/24 0042   Drainage Amount None 07/09/24 0042   Dressing Other (Comment) 07/09/24 0042   Dressing Changed Reinforced 07/07/24 2000   Dressing Status Clean;Dry 07/09/24 0042       Wound Team Summary Assessment: RLQ ileostomy pouch leaking. Pouch removed, stoma and peristomal tissue cleanse with soap and water, rinse and pat dry. Stoma red, moist, measures 1 1/2\" x 1 3/8\", protruding but does have some movement. Peristomal tissue red. Skin prep applied to peristomal tissue, Coloplast one piece high output pouch applied with barrier and connected to stark. Barrier extenders applied.      Wound Team Plan: Will follow.      Dyana Hester RN  7/9/2024  5:57 PM        "

## 2024-07-09 NOTE — PROGRESS NOTES
"Nato Downs is a 53 y.o. male on day 3 of admission presenting with Acute cholecystitis.    Subjective   Patient is pleasant, he is still in bed this morning; surgery advancing his diet, he denies pain, no nausea or vomiting, he has a SERENA drain in place still.    Objective     Physical Exam  Vitals reviewed.   Constitutional:       General: He is awake. He is not in acute distress.     Appearance: Normal appearance. He is not toxic-appearing.      Comments: Poor dentition   HENT:      Head: Normocephalic and atraumatic.      Right Ear: External ear normal.      Left Ear: External ear normal.      Nose: Nose normal.   Eyes:      Extraocular Movements: Extraocular movements intact.   Cardiovascular:      Rate and Rhythm: Normal rate and regular rhythm.   Pulmonary:      Effort: Pulmonary effort is normal. No respiratory distress.   Abdominal:      General: There is no distension.      Palpations: Abdomen is soft.      Tenderness: There ar surgical dressings in place, c/d/I, SERENA drain with scant serosangnous fluid, there is a colostomy in the right lower quadrant with soft bile colored liquid in the basin  Musculoskeletal:         General: No signs of injury.      Cervical back: Normal range of motion.   Skin:     General: Skin is warm and dry.   Neurological:      General: No focal deficit present.      Mental Status: He is alert and oriented to person, place, and time. Mental status is at baseline.   Psychiatric:         Mood and Affect: Mood normal.         Behavior: Behavior normal. Behavior is cooperative.     Last Recorded Vitals  Blood pressure 135/72, pulse 88, temperature 36.6 °C (97.9 °F), temperature source Temporal, resp. rate 18, height 1.95 m (6' 4.77\"), weight 131 kg (289 lb 11 oz), SpO2 92%.  Intake/Output last 3 Shifts:  I/O last 3 completed shifts:  In: 3102.5 (23.6 mL/kg) [I.V.:3102.5 (23.6 mL/kg)]  Out: 2200 (16.7 mL/kg) [Urine:1800 (0.4 mL/kg/hr); Drains:100; Stool:300]  Weight: 131.4 kg "     Relevant Results  Scheduled medications  clonazePAM, 0.5 mg, oral, BID  heparin (porcine), 5,000 Units, subcutaneous, q8h  ketorolac, 30 mg, intravenous, q6h TE  levETIRAcetam, 1,000 mg, oral, TID  OXcarbazepine, 600 mg, oral, BID  pantoprazole, 40 mg, intravenous, Daily  risperiDONE, 1.5 mg, oral, BID      Continuous medications  lactated Ringer's, 125 mL/hr, Last Rate: 125 mL/hr (07/09/24 0526)      PRN medications  PRN medications: morphine, morphine, ondansetron ODT **OR** ondansetron    Results for orders placed or performed during the hospital encounter of 07/03/24 (from the past 24 hour(s))   Magnesium   Result Value Ref Range    Magnesium 1.86 1.60 - 2.40 mg/dL   Comprehensive Metabolic Panel   Result Value Ref Range    Glucose 113 (H) 74 - 99 mg/dL    Sodium 134 (L) 136 - 145 mmol/L    Potassium 3.7 3.5 - 5.3 mmol/L    Chloride 99 98 - 107 mmol/L    Bicarbonate 26 21 - 32 mmol/L    Anion Gap 13 10 - 20 mmol/L    Urea Nitrogen 8 6 - 23 mg/dL    Creatinine 0.93 0.50 - 1.30 mg/dL    eGFR >90 >60 mL/min/1.73m*2    Calcium 8.0 (L) 8.6 - 10.3 mg/dL    Albumin 3.0 (L) 3.4 - 5.0 g/dL    Alkaline Phosphatase 112 33 - 120 U/L    Total Protein 5.5 (L) 6.4 - 8.2 g/dL    AST 39 9 - 39 U/L    Bilirubin, Total 0.7 0.0 - 1.2 mg/dL    ALT 89 (H) 10 - 52 U/L   CBC   Result Value Ref Range    WBC 13.8 (H) 4.4 - 11.3 x10*3/uL    nRBC 0.0 0.0 - 0.0 /100 WBCs    RBC 3.87 (L) 4.50 - 5.90 x10*6/uL    Hemoglobin 10.7 (L) 13.5 - 17.5 g/dL    Hematocrit 34.5 (L) 41.0 - 52.0 %    MCV 89 80 - 100 fL    MCH 27.6 26.0 - 34.0 pg    MCHC 31.0 (L) 32.0 - 36.0 g/dL    RDW 14.6 (H) 11.5 - 14.5 %    Platelets 290 150 - 450 x10*3/uL     US gallbladder    Result Date: 7/4/2024  Interpreted By:  Elías Lucero, STUDY: US GALLBLADDER;  7/4/2024 3:02 pm   INDICATION: Signs/Symptoms:Evaluation of cholecystitis.   COMPARISON: None.   ACCESSION NUMBER(S): [id number]   ORDERING CLINICIAN: ELVER CRAFT   TECHNIQUE: Multiple images of the right  upper quadrant were obtained.   FINDINGS: The study is limited due to patient's postprandial status and inability to hold his breath.   Visualized liver parenchyma shows increased echogenicity. There are multiple stones in the gallbladder. Gallbladder wall thickness could not be accurately measured due to incomplete distention no pericholecystic fluid is noted. Sonographic Pendleton's sign has been reported as negative. Common bile duct measures 0.7 cm in diameter. The pancreas is obscured by the overlying bowel gas. Visualized portal vein and right kidney are unremarkable. There is no free fluid in the Moreno's pouch.       Limited study.   Cholelithiasis. HIDA scan may be obtained if clinical suspicion for acute cholecystitis is high.   Minimal CBD dilatation, which may be age-appropriate. However, MRCP is recommended to rule out distal choledocholithiasis if clinically indicated.   Diffuse hepatic steatosis   Signed by: Elías Lucero 7/4/2024 4:43 PM Dictation workstation:   [id number]    CT abdomen pelvis wo IV contrast    Result Date: 7/3/2024  STUDY: CT Abdomen and Pelvis without IV Contrast; 07/03/2024 9:07 pm INDICATION: Abdominal pain.  Elevated liver enzymes. COMPARISON: CT A/P 06/20/2022. ACCESSION NUMBER(S): [id number] ORDERING CLINICIAN: KEYANA LEI TECHNIQUE: CT of the abdomen and pelvis was performed.  Contiguous axial images were obtained at 3 mm slice thickness through the abdomen and pelvis. Coronal and sagittal reconstructions at 3 mm slice thickness were performed. No intravenous contrast was administered.  Automated mA/kV exposure control was utilized and patient examination was performed in strict accordance with principles of ALARA. FINDINGS: Please note that the evaluation of vessels, lymph nodes and organs is limited without intravenous contrast.  LOWER CHEST: No cardiomegaly.  There is very small amount pericardial fluid..  Lung bases reveals chronic appearing lower lobe  atelectasis or scarring.  ABDOMEN:  LIVER: No hepatomegaly.  Smooth surface contour.  Normal attenuation.  BILE DUCTS: No intrahepatic or extrahepatic biliary ductal dilatation.  GALLBLADDER: Inflammatory changes are demonstrated surrounding the gallbladder. There does appear to be component of underlying stones or sludge. STOMACH: No abnormalities identified.  PANCREAS: No masses or ductal dilatation.  SPLEEN: No splenomegaly or focal splenic lesion.  ADRENAL GLANDS: No thickening or nodules.  KIDNEYS AND URETERS: Kidneys are normal in size and location.  Renal cysts are similar as compared to 2022 there is a very small 5 mm hyperdense right renal cyst..  PELVIS:  BLADDER: Suprapubic catheter is demonstrated. REPRODUCTIVE ORGANS: No abnormalities identified.  BOWEL: No abnormalities identified.  There is somewhat of a rectal impaction appearance.  VESSELS: No abnormalities identified.  Abdominal aorta is normal in caliber.  PERITONEUM/RETROPERITONEUM/LYMPH NODES: No free fluid.  No pneumoperitoneum. No lymphadenopathy.  ABDOMINAL WALL: No abnormalities identified. SOFT TISSUES: No abnormalities identified.  BONES: No acute fracture or aggressive osseous lesion.    1.Inflammatory changes surrounding the gallbladder with underlying stones or sludge. Correlate with ultrasound. 2.Suggestion of mild Rectal impaction. 3.Small amount pericardial fluid. Signed by Gokul Rodriguez DO         Assessment/Plan   Principal Problem:    Acute cholecystitis  History of psychosis, mood disorder  History epilepsy  History of auditory hallucinations  History of neurogenic bladder  History of colostomy    Plan:  - Patient stable to remain at inpatient level of care in current location  - Appreciate surgical consultation, will continue with advancing his diet  - Antibiotics discontinued  - In the meantime, we will trend metabolic panel including hepatic functions  - Patient is likely stable for discharge in the next 1 to 2 days, will  continue monitoring for surgery recommendations, plan of care has been discussed with the patient's sister daily when she arrives at the hospital in the afternoon    Subcu heparin for VTE prophylaxis  FULL CODE         I spent 45 minutes in the professional and overall care of this patient.    Lake Means MD  [facility] [place]  Internal Medicine    This document was generated in whole or in part using the Dragon One medical voice recognition software and there may be some incorrect words/wording, spelling, or punctuation errors that were not corrected prior to finalization in the medical record.

## 2024-07-09 NOTE — CARE PLAN
The patient's goals for the shift include get tests done    The clinical goals for the shift include Patient will remain hemodynamically stable this shift      Problem: Skin  Goal: Decreased wound size/increased tissue granulation at next dressing change  Flowsheets (Taken 7/9/2024 1142)  Decreased wound size/increased tissue granulation at next dressing change:   Promote sleep for wound healing   Utilize specialty bed per algorithm   Protective dressings over bony prominences

## 2024-07-09 NOTE — CARE PLAN
Problem: Skin  Goal: Decreased wound size/increased tissue granulation at next dressing change  Outcome: Progressing  Goal: Participates in plan/prevention/treatment measures  Outcome: Progressing  Goal: Prevent/manage excess moisture  Outcome: Progressing  Goal: Prevent/minimize sheer/friction injuries  Outcome: Progressing  Goal: Promote/optimize nutrition  Outcome: Progressing  Goal: Promote skin healing  Outcome: Progressing     Problem: Fall/Injury  Goal: Not fall by end of shift  Outcome: Progressing  Goal: Be free from injury by end of the shift  Outcome: Progressing  Goal: Verbalize understanding of personal risk factors for fall in the hospital  Outcome: Progressing  Goal: Verbalize understanding of risk factor reduction measures to prevent injury from fall in the home  Outcome: Progressing  Goal: Use assistive devices by end of the shift  Outcome: Progressing  Goal: Pace activities to prevent fatigue by end of the shift  Outcome: Progressing     Problem: Pain  Goal: Takes deep breaths with improved pain control throughout the shift  Outcome: Progressing  Goal: Turns in bed with improved pain control throughout the shift  Outcome: Progressing  Goal: Walks with improved pain control throughout the shift  Outcome: Progressing  Goal: Performs ADL's with improved pain control throughout shift  Outcome: Progressing  Goal: Participates in PT with improved pain control throughout the shift  Outcome: Progressing  Goal: Free from opioid side effects throughout the shift  Outcome: Progressing  Goal: Free from acute confusion related to pain meds throughout the shift  Outcome: Progressing     Problem: Pain - Adult  Goal: Verbalizes/displays adequate comfort level or baseline comfort level  Outcome: Progressing     Problem: Safety - Adult  Goal: Free from fall injury  Outcome: Progressing     Problem: Discharge Planning  Goal: Discharge to home or other facility with appropriate resources  Outcome: Progressing      Problem: Chronic Conditions and Co-morbidities  Goal: Patient's chronic conditions and co-morbidity symptoms are monitored and maintained or improved  Outcome: Progressing   The patient's goals for the shift include get tests done    The clinical goals for the shift include Patient will remain hemodynamically stable this shift    Patient remained safe this shift. Neuro unchanged, patient remains confused but cooperative with care. Toradol given routine for abdominal pain. Abdomen soft. Ostomy with brownish/green watery output over night. SERENA with serosanguineous output. Suprapubic catheter to continuous drainage. Denies upset stomach. VSS.

## 2024-07-10 LAB
ALBUMIN SERPL BCP-MCNC: 2.8 G/DL (ref 3.4–5)
ALP SERPL-CCNC: 100 U/L (ref 33–120)
ALT SERPL W P-5'-P-CCNC: 60 U/L (ref 10–52)
ANION GAP SERPL CALC-SCNC: 11 MMOL/L (ref 10–20)
AST SERPL W P-5'-P-CCNC: 20 U/L (ref 9–39)
BILIRUB SERPL-MCNC: 0.5 MG/DL (ref 0–1.2)
BUN SERPL-MCNC: 10 MG/DL (ref 6–23)
CALCIUM SERPL-MCNC: 7.9 MG/DL (ref 8.6–10.3)
CHLORIDE SERPL-SCNC: 103 MMOL/L (ref 98–107)
CO2 SERPL-SCNC: 27 MMOL/L (ref 21–32)
CREAT SERPL-MCNC: 1.1 MG/DL (ref 0.5–1.3)
EGFRCR SERPLBLD CKD-EPI 2021: 79 ML/MIN/1.73M*2
ERYTHROCYTE [DISTWIDTH] IN BLOOD BY AUTOMATED COUNT: 14.7 % (ref 11.5–14.5)
GLUCOSE SERPL-MCNC: 118 MG/DL (ref 74–99)
HCT VFR BLD AUTO: 30.8 % (ref 41–52)
HGB BLD-MCNC: 10.6 G/DL (ref 13.5–17.5)
MAGNESIUM SERPL-MCNC: 1.84 MG/DL (ref 1.6–2.4)
MCH RBC QN AUTO: 30.1 PG (ref 26–34)
MCHC RBC AUTO-ENTMCNC: 34.4 G/DL (ref 32–36)
MCV RBC AUTO: 88 FL (ref 80–100)
NRBC BLD-RTO: 0 /100 WBCS (ref 0–0)
PLATELET # BLD AUTO: 375 X10*3/UL (ref 150–450)
POTASSIUM SERPL-SCNC: 3.7 MMOL/L (ref 3.5–5.3)
PROT SERPL-MCNC: 5.3 G/DL (ref 6.4–8.2)
RBC # BLD AUTO: 3.52 X10*6/UL (ref 4.5–5.9)
SODIUM SERPL-SCNC: 137 MMOL/L (ref 136–145)
WBC # BLD AUTO: 15.5 X10*3/UL (ref 4.4–11.3)

## 2024-07-10 PROCEDURE — 2500000001 HC RX 250 WO HCPCS SELF ADMINISTERED DRUGS (ALT 637 FOR MEDICARE OP): Performed by: SURGERY

## 2024-07-10 PROCEDURE — 85027 COMPLETE CBC AUTOMATED: CPT | Performed by: INTERNAL MEDICINE

## 2024-07-10 PROCEDURE — 84075 ASSAY ALKALINE PHOSPHATASE: CPT | Performed by: INTERNAL MEDICINE

## 2024-07-10 PROCEDURE — 2500000001 HC RX 250 WO HCPCS SELF ADMINISTERED DRUGS (ALT 637 FOR MEDICARE OP): Performed by: INTERNAL MEDICINE

## 2024-07-10 PROCEDURE — 97110 THERAPEUTIC EXERCISES: CPT | Mod: GP,CQ

## 2024-07-10 PROCEDURE — 2500000004 HC RX 250 GENERAL PHARMACY W/ HCPCS (ALT 636 FOR OP/ED): Performed by: SURGERY

## 2024-07-10 PROCEDURE — 83735 ASSAY OF MAGNESIUM: CPT | Performed by: INTERNAL MEDICINE

## 2024-07-10 PROCEDURE — 36415 COLL VENOUS BLD VENIPUNCTURE: CPT | Performed by: INTERNAL MEDICINE

## 2024-07-10 PROCEDURE — 97116 GAIT TRAINING THERAPY: CPT | Mod: GP,CQ

## 2024-07-10 PROCEDURE — 99024 POSTOP FOLLOW-UP VISIT: CPT | Performed by: SURGERY

## 2024-07-10 PROCEDURE — 99232 SBSQ HOSP IP/OBS MODERATE 35: CPT | Performed by: INTERNAL MEDICINE

## 2024-07-10 PROCEDURE — 1100000001 HC PRIVATE ROOM DAILY

## 2024-07-10 ASSESSMENT — PAIN SCALES - GENERAL
PAINLEVEL_OUTOF10: 0 - NO PAIN

## 2024-07-10 ASSESSMENT — PAIN - FUNCTIONAL ASSESSMENT
PAIN_FUNCTIONAL_ASSESSMENT: 0-10

## 2024-07-10 ASSESSMENT — COGNITIVE AND FUNCTIONAL STATUS - GENERAL
CLIMB 3 TO 5 STEPS WITH RAILING: A LOT
MOBILITY SCORE: 12
MOVING FROM LYING ON BACK TO SITTING ON SIDE OF FLAT BED WITH BEDRAILS: A LOT
WALKING IN HOSPITAL ROOM: A LOT
TURNING FROM BACK TO SIDE WHILE IN FLAT BAD: A LOT
MOVING TO AND FROM BED TO CHAIR: A LOT
STANDING UP FROM CHAIR USING ARMS: A LOT

## 2024-07-10 NOTE — PROGRESS NOTES
"Nato Downs is a 53 y.o. male on day 4 of admission presenting with Acute cholecystitis.    Subjective   Patient is pleasant, he is still in bed this morning; surgery advanced his diet, he denies pain, no nausea or vomiting, he has a SERENA drain in place still but it was pending removal by surgery / nursing staff.    Objective     Physical Exam  Vitals reviewed.   Constitutional:       General: He is awake. He is not in acute distress.     Appearance: Normal appearance. He is not toxic-appearing.      Comments: Poor dentition   HENT:      Head: Normocephalic and atraumatic.      Right Ear: External ear normal.      Left Ear: External ear normal.      Nose: Nose normal.   Eyes:      Extraocular Movements: Extraocular movements intact.   Cardiovascular:      Rate and Rhythm: Normal rate and regular rhythm.   Pulmonary:      Effort: Pulmonary effort is normal. No respiratory distress.   Abdominal:      General: There is no distension.      Palpations: Abdomen is soft.      Tenderness: There ar surgical dressings in place, c/d/I, SERENA drain with scant serosangnous fluid, there is a colostomy in the right lower quadrant with soft bile colored liquid in the basin  Musculoskeletal:         General: No signs of injury.      Cervical back: Normal range of motion.   Skin:     General: Skin is warm and dry.   Neurological:      General: No focal deficit present.      Mental Status: He is alert and oriented to person, place, and time. Mental status is at baseline.   Psychiatric:         Mood and Affect: Mood normal.         Behavior: Behavior normal. Behavior is cooperative.     Last Recorded Vitals  Blood pressure 120/70, pulse 86, temperature 36.2 °C (97.2 °F), temperature source Temporal, resp. rate 18, height 1.95 m (6' 4.77\"), weight 131 kg (289 lb 11 oz), SpO2 93%.  Intake/Output last 3 Shifts:  I/O last 3 completed shifts:  In: 2321.3 (17.7 mL/kg) [P.O.:300; I.V.:2021.3 (15.4 mL/kg)]  Out: 4130 (31.4 mL/kg) [Urine:2225 " (0.5 mL/kg/hr); Drains:80; Stool:1825]  Weight: 131.4 kg     Relevant Results  Scheduled medications  clonazePAM, 0.5 mg, oral, BID  heparin (porcine), 5,000 Units, subcutaneous, q8h  levETIRAcetam, 1,000 mg, oral, TID  OXcarbazepine, 600 mg, oral, BID  pantoprazole, 40 mg, oral, Daily before breakfast  risperiDONE, 1.5 mg, oral, BID      Continuous medications  lactated Ringer's, 125 mL/hr, Last Rate: Stopped (07/10/24 1230)      PRN medications  PRN medications: morphine, morphine, ondansetron ODT **OR** ondansetron    Results for orders placed or performed during the hospital encounter of 07/03/24 (from the past 24 hour(s))   CBC   Result Value Ref Range    WBC 15.5 (H) 4.4 - 11.3 x10*3/uL    nRBC 0.0 0.0 - 0.0 /100 WBCs    RBC 3.52 (L) 4.50 - 5.90 x10*6/uL    Hemoglobin 10.6 (L) 13.5 - 17.5 g/dL    Hematocrit 30.8 (L) 41.0 - 52.0 %    MCV 88 80 - 100 fL    MCH 30.1 26.0 - 34.0 pg    MCHC 34.4 32.0 - 36.0 g/dL    RDW 14.7 (H) 11.5 - 14.5 %    Platelets 375 150 - 450 x10*3/uL   Magnesium   Result Value Ref Range    Magnesium 1.84 1.60 - 2.40 mg/dL   Comprehensive Metabolic Panel   Result Value Ref Range    Glucose 118 (H) 74 - 99 mg/dL    Sodium 137 136 - 145 mmol/L    Potassium 3.7 3.5 - 5.3 mmol/L    Chloride 103 98 - 107 mmol/L    Bicarbonate 27 21 - 32 mmol/L    Anion Gap 11 10 - 20 mmol/L    Urea Nitrogen 10 6 - 23 mg/dL    Creatinine 1.10 0.50 - 1.30 mg/dL    eGFR 79 >60 mL/min/1.73m*2    Calcium 7.9 (L) 8.6 - 10.3 mg/dL    Albumin 2.8 (L) 3.4 - 5.0 g/dL    Alkaline Phosphatase 100 33 - 120 U/L    Total Protein 5.3 (L) 6.4 - 8.2 g/dL    AST 20 9 - 39 U/L    Bilirubin, Total 0.5 0.0 - 1.2 mg/dL    ALT 60 (H) 10 - 52 U/L     US gallbladder    Result Date: 7/4/2024  Interpreted By:  Elías Lucero, STUDY: US GALLBLADDER;  7/4/2024 3:02 pm   INDICATION: Signs/Symptoms:Evaluation of cholecystitis.   COMPARISON: None.   ACCESSION NUMBER(S): [id number]   ORDERING CLINICIAN: ELVER CRAFT   TECHNIQUE: Multiple  images of the right upper quadrant were obtained.   FINDINGS: The study is limited due to patient's postprandial status and inability to hold his breath.   Visualized liver parenchyma shows increased echogenicity. There are multiple stones in the gallbladder. Gallbladder wall thickness could not be accurately measured due to incomplete distention no pericholecystic fluid is noted. Sonographic Pendleton's sign has been reported as negative. Common bile duct measures 0.7 cm in diameter. The pancreas is obscured by the overlying bowel gas. Visualized portal vein and right kidney are unremarkable. There is no free fluid in the Moreno's pouch.       Limited study.   Cholelithiasis. HIDA scan may be obtained if clinical suspicion for acute cholecystitis is high.   Minimal CBD dilatation, which may be age-appropriate. However, MRCP is recommended to rule out distal choledocholithiasis if clinically indicated.   Diffuse hepatic steatosis   Signed by: Elías Lucero 7/4/2024 4:43 PM Dictation workstation:   [id number]    CT abdomen pelvis wo IV contrast    Result Date: 7/3/2024  STUDY: CT Abdomen and Pelvis without IV Contrast; 07/03/2024 9:07 pm INDICATION: Abdominal pain.  Elevated liver enzymes. COMPARISON: CT A/P 06/20/2022. ACCESSION NUMBER(S): [id number] ORDERING CLINICIAN: KEYANA LEI TECHNIQUE: CT of the abdomen and pelvis was performed.  Contiguous axial images were obtained at 3 mm slice thickness through the abdomen and pelvis. Coronal and sagittal reconstructions at 3 mm slice thickness were performed. No intravenous contrast was administered.  Automated mA/kV exposure control was utilized and patient examination was performed in strict accordance with principles of ALARA. FINDINGS: Please note that the evaluation of vessels, lymph nodes and organs is limited without intravenous contrast.  LOWER CHEST: No cardiomegaly.  There is very small amount pericardial fluid..  Lung bases reveals chronic appearing  lower lobe atelectasis or scarring.  ABDOMEN:  LIVER: No hepatomegaly.  Smooth surface contour.  Normal attenuation.  BILE DUCTS: No intrahepatic or extrahepatic biliary ductal dilatation.  GALLBLADDER: Inflammatory changes are demonstrated surrounding the gallbladder. There does appear to be component of underlying stones or sludge. STOMACH: No abnormalities identified.  PANCREAS: No masses or ductal dilatation.  SPLEEN: No splenomegaly or focal splenic lesion.  ADRENAL GLANDS: No thickening or nodules.  KIDNEYS AND URETERS: Kidneys are normal in size and location.  Renal cysts are similar as compared to 2022 there is a very small 5 mm hyperdense right renal cyst..  PELVIS:  BLADDER: Suprapubic catheter is demonstrated. REPRODUCTIVE ORGANS: No abnormalities identified.  BOWEL: No abnormalities identified.  There is somewhat of a rectal impaction appearance.  VESSELS: No abnormalities identified.  Abdominal aorta is normal in caliber.  PERITONEUM/RETROPERITONEUM/LYMPH NODES: No free fluid.  No pneumoperitoneum. No lymphadenopathy.  ABDOMINAL WALL: No abnormalities identified. SOFT TISSUES: No abnormalities identified.  BONES: No acute fracture or aggressive osseous lesion.    1.Inflammatory changes surrounding the gallbladder with underlying stones or sludge. Correlate with ultrasound. 2.Suggestion of mild Rectal impaction. 3.Small amount pericardial fluid. Signed by Gokul Rodriguez,          Assessment/Plan   Principal Problem:    Acute cholecystitis  History of psychosis, mood disorder  History epilepsy  History of auditory hallucinations  History of neurogenic bladder  History of colostomy    Plan:  - Patient stable to remain at inpatient level of care in current location  - Appreciate surgical consultation, will continue with diet  - Antibiotics discontinued, patient doing well  - In the meantime, we will trend metabolic panel including hepatic functions  - Patient stable for discharge tomorrow, discussed  with sister over phone today    Subcu heparin for VTE prophylaxis  FULL CODE         I spent 45 minutes in the professional and overall care of this patient.    Lake Means MD  [facility] - [place]  Internal Medicine    This document was generated in whole or in part using the Dragon One medical voice recognition software and there may be some incorrect words/wording, spelling, or punctuation errors that were not corrected prior to finalization in the medical record.

## 2024-07-10 NOTE — PROGRESS NOTES
Nato BRAVO [place]  [id number]   1970       Subjective/      Patient ambulating in [place] with assistance    Tolerating diet    Positive BM    Nursing staff reports no pain                  Heart Rate:  []   Temp:  [36 °C (96.8 °F)-36.7 °C (98.1 °F)]   Resp:  [15-18]   BP: (105-176)/(52-79)   SpO2:  [90 %-94 %]     Intake/Output Summary (Last 24 hours) at 7/10/2024 0739  Last data filed at 7/10/2024 0619  Gross per 24 hour   Intake 1781.25 ml   Output 2830 ml   Net -1048.75 ml          Physical Exam  Abdominal:      Comments: Drain serosanguineous    Incision intact                Results for orders placed or performed during the hospital encounter of 07/03/24 (from the past 24 hour(s))   CBC   Result Value Ref Range    WBC 15.5 (H) 4.4 - 11.3 x10*3/uL    nRBC 0.0 0.0 - 0.0 /100 WBCs    RBC 3.52 (L) 4.50 - 5.90 x10*6/uL    Hemoglobin 10.6 (L) 13.5 - 17.5 g/dL    Hematocrit 30.8 (L) 41.0 - 52.0 %    MCV 88 80 - 100 fL    MCH 30.1 26.0 - 34.0 pg    MCHC 34.4 32.0 - 36.0 g/dL    RDW 14.7 (H) 11.5 - 14.5 %    Platelets 375 150 - 450 x10*3/uL   Magnesium   Result Value Ref Range    Magnesium 1.84 1.60 - 2.40 mg/dL   Comprehensive Metabolic Panel   Result Value Ref Range    Glucose 118 (H) 74 - 99 mg/dL    Sodium 137 136 - 145 mmol/L    Potassium 3.7 3.5 - 5.3 mmol/L    Chloride 103 98 - 107 mmol/L    Bicarbonate 27 21 - 32 mmol/L    Anion Gap 11 10 - 20 mmol/L    Urea Nitrogen 10 6 - 23 mg/dL    Creatinine 1.10 0.50 - 1.30 mg/dL    eGFR 79 >60 mL/min/1.73m*2    Calcium 7.9 (L) 8.6 - 10.3 mg/dL    Albumin 2.8 (L) 3.4 - 5.0 g/dL    Alkaline Phosphatase 100 33 - 120 U/L    Total Protein 5.3 (L) 6.4 - 8.2 g/dL    AST 20 9 - 39 U/L    Bilirubin, Total 0.5 0.0 - 1.2 mg/dL    ALT 60 (H) 10 - 52 U/L        I reviewed the above studies      NM hepatobiliary    Result Date: 7/5/2024  Interpreted By:  Polina Arroyo, STUDY: NM HEPATOBILIARY; 7/5/2024 4:12 pm   INDICATION: Signs/Symptoms:Inconclusive RUQ US, RUQ tenderness.    COMPARISON: CT of abdomen and pelvis on 07/03/2024.   ACCESSION NUMBER(S): [id number]   ORDERING CLINICIAN: ELVER CRAFT   TECHNIQUE: DIVISION OF NUCLEAR MEDICINE HEPATOBILIARY SCAN (HIDA)   The patient received an intravenous dose of 4.9 mCi of Tc-99m mebrofenin (Choletec).  Sequential images of the upper abdomen were then acquired over the next 120 minutes.   FINDINGS: There is prompt accumulation of activity within the liver and normal subsequent excretion via the biliary ductal system into the small bowel. Nonvisualization of gallbladder by 2 hours after radiotracer injection on both planar and SPECT CT suggestive of cystic duct obstruction and acute cholecystitis.       1. Nonvisualization of gallbladder by 2 hours after radiotracer injection on both planar and SPECT CT, suggestive of cystic duct obstruction and acute cholecystitis.   I personally reviewed the images/study. This study was interpreted at [place], [place].   MACRO: Critical Finding:  See findings. Notification was initiated on 7/5/2024 at 5:25 pm by Dr. Polina Arroyo to Dr Lake Means .  (**-YCF-**)     Signed by: Polina Arroyo 7/5/2024 5:32 PM Dictation workstation:   [id number]    US gallbladder    Result Date: 7/4/2024  Interpreted By:  Elías Lucero, STUDY: US GALLBLADDER;  7/4/2024 3:02 pm   INDICATION: Signs/Symptoms:Evaluation of cholecystitis.   COMPARISON: None.   ACCESSION NUMBER(S): [id number]   ORDERING CLINICIAN: ELVER CRAFT   TECHNIQUE: Multiple images of the right upper quadrant were obtained.   FINDINGS: The study is limited due to patient's postprandial status and inability to hold his breath.   Visualized liver parenchyma shows increased echogenicity. There are multiple stones in the gallbladder. Gallbladder wall thickness could not be accurately measured due to incomplete distention no pericholecystic fluid is noted. Sonographic Pendleton's sign has been reported as negative. Common  bile duct measures 0.7 cm in diameter. The pancreas is obscured by the overlying bowel gas. Visualized portal vein and right kidney are unremarkable. There is no free fluid in the Moreno's pouch.       Limited study.   Cholelithiasis. HIDA scan may be obtained if clinical suspicion for acute cholecystitis is high.   Minimal CBD dilatation, which may be age-appropriate. However, MRCP is recommended to rule out distal choledocholithiasis if clinically indicated.   Diffuse hepatic steatosis   Signed by: Elías Lucero 7/4/2024 4:43 PM Dictation workstation:   [id number]    CT abdomen pelvis wo IV contrast    Result Date: 7/3/2024  STUDY: CT Abdomen and Pelvis without IV Contrast; 07/03/2024 9:07 pm INDICATION: Abdominal pain.  Elevated liver enzymes. COMPARISON: CT A/P 06/20/2022. ACCESSION NUMBER(S): [id number] ORDERING CLINICIAN: KEYANA LEI TECHNIQUE: CT of the abdomen and pelvis was performed.  Contiguous axial images were obtained at 3 mm slice thickness through the abdomen and pelvis. Coronal and sagittal reconstructions at 3 mm slice thickness were performed. No intravenous contrast was administered.  Automated mA/kV exposure control was utilized and patient examination was performed in strict accordance with principles of ALARA. FINDINGS: Please note that the evaluation of vessels, lymph nodes and organs is limited without intravenous contrast.  LOWER CHEST: No cardiomegaly.  There is very small amount pericardial fluid..  Lung bases reveals chronic appearing lower lobe atelectasis or scarring.  ABDOMEN:  LIVER: No hepatomegaly.  Smooth surface contour.  Normal attenuation.  BILE DUCTS: No intrahepatic or extrahepatic biliary ductal dilatation.  GALLBLADDER: Inflammatory changes are demonstrated surrounding the gallbladder. There does appear to be component of underlying stones or sludge. STOMACH: No abnormalities identified.  PANCREAS: No masses or ductal dilatation.  SPLEEN: No splenomegaly or  focal splenic lesion.  ADRENAL GLANDS: No thickening or nodules.  KIDNEYS AND URETERS: Kidneys are normal in size and location.  Renal cysts are similar as compared to 2022 there is a very small 5 mm hyperdense right renal cyst..  PELVIS:  BLADDER: Suprapubic catheter is demonstrated. REPRODUCTIVE ORGANS: No abnormalities identified.  BOWEL: No abnormalities identified.  There is somewhat of a rectal impaction appearance.  VESSELS: No abnormalities identified.  Abdominal aorta is normal in caliber.  PERITONEUM/RETROPERITONEUM/LYMPH NODES: No free fluid.  No pneumoperitoneum. No lymphadenopathy.  ABDOMINAL WALL: No abnormalities identified. SOFT TISSUES: No abnormalities identified.  BONES: No acute fracture or aggressive osseous lesion.    1.Inflammatory changes surrounding the gallbladder with underlying stones or sludge. Correlate with ultrasound. 2.Suggestion of mild Rectal impaction. 3.Small amount pericardial fluid. Signed by Gokul Rodriguez DO       I have reviewed the images and the reports        Assessment/    Stable postop      Plan/    May remove drain    May DC home from my viewpoint      Aidan Sapp MD

## 2024-07-10 NOTE — CARE PLAN
The patient's goals for the shift include get tests done    The clinical goals for the shift include pt will increase active aeb ambulating this shift    Over the shift, the patient did make progress towards his goals this shift has been up an ambulated in room with 2 person from sit to stand and 1 person gait belt and walker to ambulate, pt has reposition self in bed, and has slept in long intervals , pt has remained safe this shift

## 2024-07-10 NOTE — CARE PLAN
The patient's goals for the shift include get tests done    The clinical goals for the shift include Pt will remain HDS this shift.      Problem: Skin  Goal: Decreased wound size/increased tissue granulation at next dressing change  Outcome: Progressing  Goal: Participates in plan/prevention/treatment measures  Outcome: Progressing  Goal: Prevent/manage excess moisture  Outcome: Progressing  Goal: Prevent/minimize sheer/friction injuries  Outcome: Progressing  Goal: Promote/optimize nutrition  Outcome: Progressing  Goal: Promote skin healing  Outcome: Progressing     Problem: Fall/Injury  Goal: Not fall by end of shift  Outcome: Progressing  Goal: Be free from injury by end of the shift  Outcome: Progressing  Goal: Verbalize understanding of personal risk factors for fall in the hospital  Outcome: Progressing  Goal: Verbalize understanding of risk factor reduction measures to prevent injury from fall in the home  Outcome: Progressing  Goal: Use assistive devices by end of the shift  Outcome: Progressing  Goal: Pace activities to prevent fatigue by end of the shift  Outcome: Progressing     Problem: Pain  Goal: Takes deep breaths with improved pain control throughout the shift  Outcome: Progressing  Goal: Turns in bed with improved pain control throughout the shift  Outcome: Progressing  Goal: Walks with improved pain control throughout the shift  Outcome: Progressing  Goal: Performs ADL's with improved pain control throughout shift  Outcome: Progressing  Goal: Participates in PT with improved pain control throughout the shift  Outcome: Progressing  Goal: Free from opioid side effects throughout the shift  Outcome: Progressing  Goal: Free from acute confusion related to pain meds throughout the shift  Outcome: Progressing   Pt has remained safe throughout the shift.

## 2024-07-10 NOTE — PROGRESS NOTES
"Physical Therapy    Physical Therapy    Physical Therapy Treatment    Patient Name: Nato Downs  MRN: [id number]  Today's Date: 7/10/2024  Time Calculation  Start Time: 0946  Stop Time: 1015  Time Calculation (min): 29 min     4108/4108-A    Assessment/Plan   PT Assessment  PT Assessment Results: Decreased strength, Decreased endurance, Impaired balance, Decreased mobility  End of Session Communication: Bedside nurse  End of Session Patient Position: Up in chair, Alarm on  PT Plan  Inpatient/Swing Bed or Outpatient: Inpatient  PT Plan  Treatment/Interventions: Bed mobility, Transfer training, Gait training, Stair training, Balance training, Neuromuscular re-education, Strengthening, Endurance training, Range of motion, Therapeutic exercise, Therapeutic activity, Home exercise program, Positioning, Postural re-education  PT Plan: Ongoing PT  PT Frequency: 3 times per week  PT Discharge Recommendations: Low intensity level of continued care, Moderate intensity level of continued care  Equipment Recommended upon Discharge: Wheeled walker  PT Recommended Transfer Status: Assist x2, Assistive device  PT - OK to Discharge: Yes (Pt is ok to discharge from acute care PT to next level of care once cleared by medical team.)     07/10/24 0946   PT  Visit   PT Received On 07/10/24   Response to Previous Treatment Patient with no complaints from previous session.   General   Reason for Referral Per ED note, \"an 53 y.o. male with history including epilepsy, encephalopathy, psychosis, mood affective disorder presenting to the emergency department for jaundice and abdominal pain.  Per the facility patient has had abdominal pain intermittently.  He denies any abdominal pain at this time but does agree that he gets pain after eating.  They were also concerned from his facility that he looked a little jaundiced and sent him in for evaluation.  Patient cannot provide much further information in regards to this.  He denies any chest " "pain or shortness of breath, fevers, chills.\" Pt dx with acute cholecystitis and is now s/p open cholecystectomy 7/7.   Referred By Lake Means MD   General Comment Patient is sleepy this am agreebale to getting up   Pain Assessment   Pain Assessment 0-10   0-10 (Numeric) Pain Score 0 - No pain   General Observation   General Observation [place] does not initiate movement without verbal cues to break task down this am is sleepy   Therapeutic Exercise   Therapeutic Exercise Performed Yes   Therapeutic Exercise Activity 1 AP/LAQ/marching/pillow squeeze/abd with manual resistance x10-15 with v/t cues to stay on task and complete   Bed Mobility   Bed Mobility Yes   Bed Mobility 1   Bed Mobility 1 Supine to sitting   Level of Assistance 1 Maximum assistance  (x2)   Ambulation/Gait Training   Ambulation/Gait Training Performed Yes   Ambulation/Gait Training 1   Surface 1 Level tile   Device 1 Rolling walker   Gait Support Devices Gait belt   Assistance 1 Moderate assistance;Moderate verbal cues   Quality of Gait 1 Diminished heel strike;Inconsistent stride length   Comments/Distance (ft) 1 10  (Breakfast arrived)   Transfers   Transfer Yes   Transfer 1   Technique 1 Sit to stand;Stand to sit   Transfer Device 1 Walker;Gait belt   Transfer Level of Assistance 1 Moderate assistance;Moderate verbal cues;+2   Trials/Comments 1 x2   PT Assessment   PT Assessment Results Decreased strength;Decreased endurance;Impaired balance;Decreased mobility   End of Session Communication Bedside nurse   End of Session Patient Position Up in chair;Alarm on   PT Plan   Inpatient/Swing Bed or Outpatient Inpatient     Outcome Measures:  [facility] Basic Mobility  Turning from your back to your side while in a flat bed without using bedrails: A lot  Moving from lying on your back to sitting on the side of a flat bed without using bedrails: A lot  Moving to and from bed to chair (including a wheelchair): A lot  Standing up from a chair using your " arms (e.g. wheelchair or bedside chair): A lot  To walk in hospital room: A lot  Climbing 3-5 steps with railing: A lot  Basic Mobility - Total Score: 12                             EDUCATION:  Outpatient Education  Individual(s) Educated: Patient  Education Provided: Fall Risk  Education Documentation  No documentation found.  Education Comments  No comments found.        GOALS:  Encounter Problems       Encounter Problems (Active)       PT Problem       Bed mobility (Progressing)       Start:  07/08/24    Expected End:  07/22/24       Pt will perform supine<>sit with HOB flat, MinAx1.           Transfers (Progressing)       Start:  07/08/24    Expected End:  07/22/24       Pt will perform all transfers with LRAD, MinAx1.            Gait (Progressing)       Start:  07/08/24    Expected End:  07/22/24       Pt will amb 50' with LRAD, MinAx1 with wheelchair follow, with reciprocal gait, upright posture and improved activity tolerance as demonstrated by vitals.           Balance (Progressing)       Start:  07/08/24    Expected End:  07/22/24       Pt will tolerate standing x2 min with BUE support, CGAx1.         Strength (Progressing)       Start:  07/08/24    Expected End:  07/22/24       Pt will improve gross BLE strength 3+/5.            Pain - Adult

## 2024-07-11 VITALS
HEART RATE: 85 BPM | HEIGHT: 77 IN | BODY MASS INDEX: 34.2 KG/M2 | DIASTOLIC BLOOD PRESSURE: 65 MMHG | SYSTOLIC BLOOD PRESSURE: 113 MMHG | TEMPERATURE: 98.1 F | RESPIRATION RATE: 17 BRPM | OXYGEN SATURATION: 93 % | WEIGHT: 289.68 LBS

## 2024-07-11 LAB
ALBUMIN SERPL BCP-MCNC: 3 G/DL (ref 3.4–5)
ALP SERPL-CCNC: 103 U/L (ref 33–120)
ALT SERPL W P-5'-P-CCNC: 49 U/L (ref 10–52)
ANION GAP SERPL CALC-SCNC: 11 MMOL/L (ref 10–20)
AST SERPL W P-5'-P-CCNC: 16 U/L (ref 9–39)
BILIRUB SERPL-MCNC: 0.5 MG/DL (ref 0–1.2)
BUN SERPL-MCNC: 12 MG/DL (ref 6–23)
CALCIUM SERPL-MCNC: 8.1 MG/DL (ref 8.6–10.3)
CHLORIDE SERPL-SCNC: 101 MMOL/L (ref 98–107)
CO2 SERPL-SCNC: 28 MMOL/L (ref 21–32)
CREAT SERPL-MCNC: 1.03 MG/DL (ref 0.5–1.3)
EGFRCR SERPLBLD CKD-EPI 2021: 86 ML/MIN/1.73M*2
ERYTHROCYTE [DISTWIDTH] IN BLOOD BY AUTOMATED COUNT: 14.8 % (ref 11.5–14.5)
GLUCOSE SERPL-MCNC: 126 MG/DL (ref 74–99)
HCT VFR BLD AUTO: 32.7 % (ref 41–52)
HGB BLD-MCNC: 10.2 G/DL (ref 13.5–17.5)
MAGNESIUM SERPL-MCNC: 1.82 MG/DL (ref 1.6–2.4)
MCH RBC QN AUTO: 28.1 PG (ref 26–34)
MCHC RBC AUTO-ENTMCNC: 31.2 G/DL (ref 32–36)
MCV RBC AUTO: 90 FL (ref 80–100)
NRBC BLD-RTO: 0 /100 WBCS (ref 0–0)
PLATELET # BLD AUTO: 342 X10*3/UL (ref 150–450)
POTASSIUM SERPL-SCNC: 3.6 MMOL/L (ref 3.5–5.3)
PROT SERPL-MCNC: 5.7 G/DL (ref 6.4–8.2)
RBC # BLD AUTO: 3.63 X10*6/UL (ref 4.5–5.9)
SODIUM SERPL-SCNC: 136 MMOL/L (ref 136–145)
WBC # BLD AUTO: 13.6 X10*3/UL (ref 4.4–11.3)

## 2024-07-11 PROCEDURE — 2500000001 HC RX 250 WO HCPCS SELF ADMINISTERED DRUGS (ALT 637 FOR MEDICARE OP): Performed by: INTERNAL MEDICINE

## 2024-07-11 PROCEDURE — 99239 HOSP IP/OBS DSCHRG MGMT >30: CPT | Performed by: INTERNAL MEDICINE

## 2024-07-11 PROCEDURE — 82947 ASSAY GLUCOSE BLOOD QUANT: CPT | Performed by: INTERNAL MEDICINE

## 2024-07-11 PROCEDURE — 2500000001 HC RX 250 WO HCPCS SELF ADMINISTERED DRUGS (ALT 637 FOR MEDICARE OP): Performed by: SURGERY

## 2024-07-11 PROCEDURE — 83735 ASSAY OF MAGNESIUM: CPT | Performed by: INTERNAL MEDICINE

## 2024-07-11 PROCEDURE — 99024 POSTOP FOLLOW-UP VISIT: CPT | Performed by: SURGERY

## 2024-07-11 PROCEDURE — 36415 COLL VENOUS BLD VENIPUNCTURE: CPT | Performed by: INTERNAL MEDICINE

## 2024-07-11 PROCEDURE — 97110 THERAPEUTIC EXERCISES: CPT | Mod: GP,CQ

## 2024-07-11 PROCEDURE — 85027 COMPLETE CBC AUTOMATED: CPT | Performed by: INTERNAL MEDICINE

## 2024-07-11 PROCEDURE — 97530 THERAPEUTIC ACTIVITIES: CPT | Mod: GP,CQ

## 2024-07-11 PROCEDURE — 2500000004 HC RX 250 GENERAL PHARMACY W/ HCPCS (ALT 636 FOR OP/ED): Performed by: SURGERY

## 2024-07-11 ASSESSMENT — PAIN - FUNCTIONAL ASSESSMENT
PAIN_FUNCTIONAL_ASSESSMENT: 0-10
PAIN_FUNCTIONAL_ASSESSMENT: 0-10

## 2024-07-11 ASSESSMENT — COGNITIVE AND FUNCTIONAL STATUS - GENERAL
MOVING FROM LYING ON BACK TO SITTING ON SIDE OF FLAT BED WITH BEDRAILS: A LOT
STANDING UP FROM CHAIR USING ARMS: A LOT
TURNING FROM BACK TO SIDE WHILE IN FLAT BAD: A LOT
MOVING TO AND FROM BED TO CHAIR: A LOT
MOBILITY SCORE: 12
WALKING IN HOSPITAL ROOM: A LOT
CLIMB 3 TO 5 STEPS WITH RAILING: A LOT

## 2024-07-11 ASSESSMENT — ACTIVITIES OF DAILY LIVING (ADL): EFFECT OF PAIN ON DAILY ACTIVITIES: .

## 2024-07-11 ASSESSMENT — PAIN SCALES - GENERAL
PAINLEVEL_OUTOF10: 0 - NO PAIN
PAINLEVEL_OUTOF10: 0 - NO PAIN

## 2024-07-11 NOTE — PROGRESS NOTES
Nato BRAVO [place]  [id number]   1970       Subjective/        Discharge delayed yesterday.  Discharge scheduled for later today    Patient tolerating diet    No pain                Heart Rate:  [80-93]   Temp:  [36.1 °C (97 °F)-36.6 °C (97.9 °F)]   Resp:  [16-18]   BP: ()/(57-70)   SpO2:  [92 %-93 %]     Intake/Output Summary (Last 24 hours) at 7/11/2024 0824  Last data filed at 7/11/2024 0400  Gross per 24 hour   Intake 1175 ml   Output 1530 ml   Net -355 ml          Physical Exam  Abdominal:      Comments: Right upper quadrant incision clean and dry without redness or drainage    Drain present, minimal output                Results for orders placed or performed during the hospital encounter of 07/03/24 (from the past 24 hour(s))   CBC   Result Value Ref Range    WBC 13.6 (H) 4.4 - 11.3 x10*3/uL    nRBC 0.0 0.0 - 0.0 /100 WBCs    RBC 3.63 (L) 4.50 - 5.90 x10*6/uL    Hemoglobin 10.2 (L) 13.5 - 17.5 g/dL    Hematocrit 32.7 (L) 41.0 - 52.0 %    MCV 90 80 - 100 fL    MCH 28.1 26.0 - 34.0 pg    MCHC 31.2 (L) 32.0 - 36.0 g/dL    RDW 14.8 (H) 11.5 - 14.5 %    Platelets 342 150 - 450 x10*3/uL   Magnesium   Result Value Ref Range    Magnesium 1.82 1.60 - 2.40 mg/dL   Comprehensive Metabolic Panel   Result Value Ref Range    Glucose 126 (H) 74 - 99 mg/dL    Sodium 136 136 - 145 mmol/L    Potassium 3.6 3.5 - 5.3 mmol/L    Chloride 101 98 - 107 mmol/L    Bicarbonate 28 21 - 32 mmol/L    Anion Gap 11 10 - 20 mmol/L    Urea Nitrogen 12 6 - 23 mg/dL    Creatinine 1.03 0.50 - 1.30 mg/dL    eGFR 86 >60 mL/min/1.73m*2    Calcium 8.1 (L) 8.6 - 10.3 mg/dL    Albumin 3.0 (L) 3.4 - 5.0 g/dL    Alkaline Phosphatase 103 33 - 120 U/L    Total Protein 5.7 (L) 6.4 - 8.2 g/dL    AST 16 9 - 39 U/L    Bilirubin, Total 0.5 0.0 - 1.2 mg/dL    ALT 49 10 - 52 U/L        I reviewed the above studies      NM hepatobiliary    Result Date: 7/5/2024  Interpreted By:  Polina Arroyo, STUDY: NM HEPATOBILIARY; 7/5/2024 4:12 pm   INDICATION:  Signs/Symptoms:Inconclusive RUQ US, RUQ tenderness.   COMPARISON: CT of abdomen and pelvis on 07/03/2024.   ACCESSION NUMBER(S): [id number]   ORDERING CLINICIAN: ELVER CRAFT   TECHNIQUE: DIVISION OF NUCLEAR MEDICINE HEPATOBILIARY SCAN (HIDA)   The patient received an intravenous dose of 4.9 mCi of Tc-99m mebrofenin (Choletec).  Sequential images of the upper abdomen were then acquired over the next 120 minutes.   FINDINGS: There is prompt accumulation of activity within the liver and normal subsequent excretion via the biliary ductal system into the small bowel. Nonvisualization of gallbladder by 2 hours after radiotracer injection on both planar and SPECT CT suggestive of cystic duct obstruction and acute cholecystitis.       1. Nonvisualization of gallbladder by 2 hours after radiotracer injection on both planar and SPECT CT, suggestive of cystic duct obstruction and acute cholecystitis.   I personally reviewed the images/study. This study was interpreted at [place], [place].   MACRO: Critical Finding:  See findings. Notification was initiated on 7/5/2024 at 5:25 pm by Dr. Polina Arroyo to Dr Lake Means .  (**-YCF-**)     Signed by: Polina Arroyo 7/5/2024 5:32 PM Dictation workstation:   [id number]    US gallbladder    Result Date: 7/4/2024  Interpreted By:  Elías Lucero, STUDY: US GALLBLADDER;  7/4/2024 3:02 pm   INDICATION: Signs/Symptoms:Evaluation of cholecystitis.   COMPARISON: None.   ACCESSION NUMBER(S): [id number]   ORDERING CLINICIAN: ELVER CRAFT   TECHNIQUE: Multiple images of the right upper quadrant were obtained.   FINDINGS: The study is limited due to patient's postprandial status and inability to hold his breath.   Visualized liver parenchyma shows increased echogenicity. There are multiple stones in the gallbladder. Gallbladder wall thickness could not be accurately measured due to incomplete distention no pericholecystic fluid is noted. Sonographic  Pendleton's sign has been reported as negative. Common bile duct measures 0.7 cm in diameter. The pancreas is obscured by the overlying bowel gas. Visualized portal vein and right kidney are unremarkable. There is no free fluid in the Moreno's pouch.       Limited study.   Cholelithiasis. HIDA scan may be obtained if clinical suspicion for acute cholecystitis is high.   Minimal CBD dilatation, which may be age-appropriate. However, MRCP is recommended to rule out distal choledocholithiasis if clinically indicated.   Diffuse hepatic steatosis   Signed by: Elías Lucero 7/4/2024 4:43 PM Dictation workstation:   [id number]    CT abdomen pelvis wo IV contrast    Result Date: 7/3/2024  STUDY: CT Abdomen and Pelvis without IV Contrast; 07/03/2024 9:07 pm INDICATION: Abdominal pain.  Elevated liver enzymes. COMPARISON: CT A/P 06/20/2022. ACCESSION NUMBER(S): [id number] ORDERING CLINICIAN: KEYANA LEI TECHNIQUE: CT of the abdomen and pelvis was performed.  Contiguous axial images were obtained at 3 mm slice thickness through the abdomen and pelvis. Coronal and sagittal reconstructions at 3 mm slice thickness were performed. No intravenous contrast was administered.  Automated mA/kV exposure control was utilized and patient examination was performed in strict accordance with principles of ALARA. FINDINGS: Please note that the evaluation of vessels, lymph nodes and organs is limited without intravenous contrast.  LOWER CHEST: No cardiomegaly.  There is very small amount pericardial fluid..  Lung bases reveals chronic appearing lower lobe atelectasis or scarring.  ABDOMEN:  LIVER: No hepatomegaly.  Smooth surface contour.  Normal attenuation.  BILE DUCTS: No intrahepatic or extrahepatic biliary ductal dilatation.  GALLBLADDER: Inflammatory changes are demonstrated surrounding the gallbladder. There does appear to be component of underlying stones or sludge. STOMACH: No abnormalities identified.  PANCREAS: No masses or  ductal dilatation.  SPLEEN: No splenomegaly or focal splenic lesion.  ADRENAL GLANDS: No thickening or nodules.  KIDNEYS AND URETERS: Kidneys are normal in size and location.  Renal cysts are similar as compared to 2022 there is a very small 5 mm hyperdense right renal cyst..  PELVIS:  BLADDER: Suprapubic catheter is demonstrated. REPRODUCTIVE ORGANS: No abnormalities identified.  BOWEL: No abnormalities identified.  There is somewhat of a rectal impaction appearance.  VESSELS: No abnormalities identified.  Abdominal aorta is normal in caliber.  PERITONEUM/RETROPERITONEUM/LYMPH NODES: No free fluid.  No pneumoperitoneum. No lymphadenopathy.  ABDOMINAL WALL: No abnormalities identified. SOFT TISSUES: No abnormalities identified.  BONES: No acute fracture or aggressive osseous lesion.    1.Inflammatory changes surrounding the gallbladder with underlying stones or sludge. Correlate with ultrasound. 2.Suggestion of mild Rectal impaction. 3.Small amount pericardial fluid. Signed by Gokul Rodriguez DO       I have reviewed the images and the reports        Assessment/    Doing well      Plan/    Remove the drain    Good for discharge      Aidan Sapp MD

## 2024-07-11 NOTE — DISCHARGE SUMMARY
"Discharge Diagnosis  Acute cholecystitis    Issues Requiring Follow-Up  It was a pleasure to meet you in the hospital  You were diagnosed as having gallbladder disease that required surgery  Initially it was not clear if you had a stone that was blocking in the gallbladder tubing, although as it was discussed with your sister, Afia, in detail it was likely from longstanding gallbladder disease that resulted in a gallbladder that had some scar tissue; the gallbladder still required coming out and surgery, although this could not be done laparoscopically and was done by \"open cholecystectomy\"  He did very well after having the surgery, initially there was a surgical drain in place to help with some postsurgical fluids, although this has been removed and you are tolerating eating well  You will need to follow-up with Dr. Sapp in surgery within 7 to 10 days after discharge from the hospital  There are no other changes to your usual medications and you can go back to your living facility as you previously were with resumption of your usual ostomy care    Test Results Pending At Discharge  Pending Labs       Order Current Status    Surgical Pathology Exam In process            Hospital Course  Nato Downs is a 53 y.o. male presenting with abdominal pain. H/o baseline encephalopathy, psychosis, mood disorder, epilepsy, auditory hallucinations, neurogenic bladder/bowel; colostomy placed over a decade ago, overarching etiology of his condition unclear from chart review but suggestive of congenital anomaly. Pt lives at long term care facility, whose staff recently noted intermittent abdominal pain and jaundice and sent him in. He is able to convey pain with eating. No other symptoms are apparent incl CP/SOB, NVD, dysuria, increased ostomy output, recent illness/exposures.     In the ED, VSS. Labs show mild leukocytosis improved from yesterday, new hyperbili, mild transaminitis. UA chronically similar to sample from a " year ago. Imaging concerning for gallbladder inflammation with stones/sludge. Pt given cefepime/flagyl and admitted for acute cholecystitis.    Patient's total bilirubin actually resolved, he initially was planned for MRCP although cannot tolerate the MRI, general surgery was consulted and agreed that cholecystectomy would be his best option, he was consented initially for laparoscopic with chance of open, during the procedure it was noted that he had a lot of scarring of the gallbladder and the procedure was converted to an open procedure which he tolerated very well; intraoperative cholangiogram was unsuccessful, the patient continued to do well and his bilirubin remains appropriately low, although GI service will arrange for outpatient EUS plus or minus ERCP with their office to arrange, he was otherwise tolerating a diet and will follow-up with surgery in 7 to 10 days after the hospitalization.  There will be no other changes to his home medications.  During the hospitalization, the case was discussed with his older sister and legal guardian, Afia, and the patient will be discharged back to his normal living facility and continue his usual care of his stoma in addition to his other routine health needs.    Greater than 30 minutes was spent facilitating this patients discharge from the hospital which included examining the patient, reconciling medications, and making arrangements for future care.    Lake Means MD  [facility]  Internal Medicine    This document was generated in whole or in part using the Dragon One medical voice recognition software and there may be some incorrect words/wording, spelling, or punctuation errors that were not corrected prior to finalization in the medical record.    Pertinent Physical Exam At Time of Discharge  Physical Exam  Vitals reviewed.   Constitutional:       General: He is awake. He is not in acute distress.     Appearance: Normal appearance. He is not  toxic-appearing.      Comments: Poor dentition   HENT:      Head: Normocephalic and atraumatic.      Right Ear: External ear normal.      Left Ear: External ear normal.      Nose: Nose normal.   Eyes:      Extraocular Movements: Extraocular movements intact.   Cardiovascular:      Rate and Rhythm: Normal rate and regular rhythm.   Pulmonary:      Effort: Pulmonary effort is normal. No respiratory distress.   Abdominal:      General: There is no distension.      Palpations: Abdomen is soft.      Tenderness: There ar surgical dressings in place, c/d/I, SERENA drain removed since yesterday's examination, there is a colostomy in the right lower quadrant with soft stool in basin today and healthy appearing red stoma  Musculoskeletal:         General: No signs of injury.      Cervical back: Normal range of motion.   Skin:     General: Skin is warm and dry.   Neurological:      General: No focal deficit present.      Mental Status: He is alert and oriented to person, place, and time. Mental status is at baseline.   Psychiatric:         Mood and Affect: Mood normal.         Behavior: Behavior normal. Behavior is cooperative.     Home Medications     Medication List      ASK your doctor about these medications     acetaminophen 325 mg tablet; Commonly known as: Tylenol   ascorbic acid 500 mg tablet; Commonly known as: Vitamin C; Ask about:   Which instructions should I use?   B COMPLEX-C-E-ZN ORAL   Benefiber Healthy Shape 5 gram/7.4 gram powder; Generic drug: wheat   dextrin   cholecalciferol 5,000 Units tablet; Commonly known as: Vitamin D-3   clonazePAM 0.5 mg tablet; Commonly known as: KlonoPIN; Take 1 tablet   (0.5 mg) by mouth 2 times a day.   cranberry 450 mg tablet; Generic drug: cranberry fruit   diazePAM 5-7.5-10 mg rectal kit; Commonly known as: Diastat Acudial;   Insert 10 mg into the rectum if needed for seizures. Prior to   administration, review instruction sheet supplied with dose unit. Verify   the ordered dose  is set for administration.   levETIRAcetam 1,000 mg tablet; Commonly known as: Keppra   omeprazole OTC 20 mg EC tablet; Commonly known as: PriLOSEC OTC   OXcarbazepine 600 mg tablet; Commonly known as: Trileptal   oxybutynin XL 10 mg 24 hr tablet; Commonly known as: Ditropan-XL   risperiDONE 1 mg tablet; Commonly known as: RisperDAL       Outpatient Follow-Up  No future appointments.    Lake Means MD

## 2024-07-11 NOTE — CARE PLAN
The patient's goals for the shift include get tests done    The clinical goals for the shift include Pt will remain safe this shift.    Pt was safely discharged to [place].

## 2024-07-11 NOTE — DISCHARGE INSTRUCTIONS
"It was a pleasure to meet you in the hospital  You were diagnosed as having gallbladder disease that required surgery  Initially it was not clear if you had a stone that was blocking in the gallbladder tubing, although as it was discussed with your sister, Afia, in detail it was likely from longstanding gallbladder disease that resulted in a gallbladder that had some scar tissue; the gallbladder still required coming out and surgery, although this could not be done laparoscopically and was done by \"open cholecystectomy\"  He did very well after having the surgery, initially there was a surgical drain in place to help with some postsurgical fluids, although this has been removed and you are tolerating eating well  You will need to follow-up with Dr. Sapp in surgery within 7 to 10 days after discharge from the hospital  There are no other changes to your usual medications and you can go back to your living facility as you previously were  "

## 2024-07-11 NOTE — CARE PLAN
The patient's goals for the shift include get tests done    The clinical goals for the shift include pt will increase his activity aeb ambulating in hallway this shift    Over the shift, the patient did make progress towards his goals this shift has been out of bed ambulated in hallway with gait belt and walker , requires 2 people to get out of bed but only standby assist to ambulate, walker 150 feet, pt has tolerated diet , denies pain no facial grimacing or guarding noted, pt has had ostomy pouch changed this shift, has had over 500ml out this shift, pt has remained safe this shift

## 2024-07-11 NOTE — PROGRESS NOTES
"Physical Therapy    Physical Therapy    Physical Therapy Treatment    Patient Name: Nato Downs  MRN: [id number]  Today's Date: 7/11/2024  Time Calculation  Start Time: 0946  Stop Time: 1010  Time Calculation (min): 24 min     4108/4108-A       07/11/24 0946   PT  Visit   PT Received On 07/11/24   Response to Previous Treatment Patient with no complaints from previous session.   General   Reason for Referral Per ED note, \"an 53 y.o. male with history including epilepsy, encephalopathy, psychosis, mood affective disorder presenting to the emergency department for jaundice and abdominal pain.  Per the facility patient has had abdominal pain intermittently.  He denies any abdominal pain at this time but does agree that he gets pain after eating.  They were also concerned from his facility that he looked a little jaundiced and sent him in for evaluation.  Patient cannot provide much further information in regards to this.  He denies any chest pain or shortness of breath, fevers, chills.\" Pt dx with acute cholecystitis and is now s/p open cholecystectomy 7/7.   Referred By Lake Means MD   Prior to Session Communication Bedside nurse   Patient Position Received Alarm on;Bed, 4 rail up  (seizure pads in  place)   General Comment Pt agreeable to therapy and cleared for participation   Precautions   Medical Precautions Fall precautions   Pain Assessment   Pain Assessment 0-10   0-10 (Numeric) Pain Score 0 - No pain   Effect of Pain on Daily Activities .   Cognition   Orientation Level Disoriented to time;Disoriented to situation   Therapeutic Exercise   Therapeutic Exercise Performed Yes   Therapeutic Exercise Activity 1 AP/LAQ/HS curls with yellow tband/ball squeeze/abd/marching x20   Balance/Neuromuscular Re-Education   Balance/Neuromuscular Re-Education Activity Performed Yes   Balance/Neuromuscular Re-Education Activity 1 static standing balance trials with cues to facilitate upright posture. WBOS wit cues to bring " feet to hip width to normalize stance, fair follow through.  B UE support, miinA x1 for safety   Bed Mobility   Bed Mobility Yes   Bed Mobility 1   Bed Mobility 1 Supine to sitting   Level of Assistance 1 Maximum verbal cues;Maximum assistance;Moderate tactile cues;+2   Bed Mobility Comments 1 HOB elevated, cues for sequencing and to scoot hips toward EOB, maxAx2 to complete.   Ambulation/Gait Training   Ambulation/Gait Training Performed Yes   Ambulation/Gait Training 1   Surface 1 Level tile   Device 1 Rolling walker   Gait Support Devices Gait belt   Assistance 1 Moderate assistance;Moderate verbal cues   Quality of Gait 1 WBOS;Diminished heel strike;Inconsistent stride length   Comments/Distance (ft) 1 6' flexed posture, WBOS. Cues to stay inside frame of WW and for safe turning steps with fair follow through.   Transfers   Transfer Yes   Transfer 1   Transfer From 1 Bed to   Transfer to 1 Stand;Chair with arms   Technique 1 Sit to stand;Stand to sit   Transfer Device 1 Walker;Gait belt   Transfer Level of Assistance 1 Moderate assistance;Moderate verbal cues   Trials/Comments 1 x2 pt attempting to pull up on WW requiring cues for UE placement and sequencing for increased safety, fair follow through.   Transfers 2   Transfer From 2 Chair with arms to   Transfer to 2 Sit;Stand   Technique 2 Sit to stand;Stand to sit   Transfer Device 2 Walker;Gait belt   Transfer Level of Assistance 2 Minimum assistance;Moderate verbal cues   Trials/Comments 2 x4 consecutive STS for increased strength, safety and stability with functional transfers.   Activity Tolerance   Endurance Tolerates 10 - 20 min exercise with multiple rests   PT Assessment   PT Assessment Results Decreased strength;Decreased endurance;Impaired balance;Decreased mobility   Rehab Prognosis Good   Evaluation/Treatment Tolerance Patient tolerated treatment well   End of Session Communication Bedside nurse   Assessment Comment Pt put forth good effort this  session. MaxAx2 for bed mobility, modAx2 for intinial stand progrressing to minAx1 for subsequent STS from chair with arms.. Cues throughout ffor UE placement, sequencing and safety.   End of Session Patient Position Up in chair;Alarm on     Outcome Measures:  [facility] Basic Mobility  Turning from your back to your side while in a flat bed without using bedrails: A lot  Moving from lying on your back to sitting on the side of a flat bed without using bedrails: A lot  Moving to and from bed to chair (including a wheelchair): A lot  Standing up from a chair using your arms (e.g. wheelchair or bedside chair): A lot  To walk in hospital room: A lot  Climbing 3-5 steps with railing: A lot  Basic Mobility - Total Score: 12                             EDUCATION:  Outpatient Education  Individual(s) Educated: Patient  Education Provided: Fall Risk  Education Documentation  Mobility Training, taught by Sally Deluca PTA at 7/11/2024 10:57 AM.  Learner: Patient  Readiness: Acceptance  Method: Explanation, Demonstration  Response: Verbalizes Understanding, Demonstrated Understanding, Needs Reinforcement    Education Comments  No comments found.        GOALS:  Encounter Problems       Encounter Problems (Active)       PT Problem       Bed mobility (Progressing)       Start:  07/08/24    Expected End:  07/22/24       Pt will perform supine<>sit with HOB flat, MinAx1.           Transfers (Progressing)       Start:  07/08/24    Expected End:  07/22/24       Pt will perform all transfers with LRAD, MinAx1.            Gait (Progressing)       Start:  07/08/24    Expected End:  07/22/24       Pt will amb 50' with LRAD, MinAx1 with wheelchair follow, with reciprocal gait, upright posture and improved activity tolerance as demonstrated by vitals.           Balance (Progressing)       Start:  07/08/24    Expected End:  07/22/24       Pt will tolerate standing x2 min with BUE support, CGAx1.         Strength (Progressing)       Start:   07/08/24    Expected End:  07/22/24       Pt will improve gross BLE strength 3+/5.            Pain - Adult

## 2024-07-11 NOTE — NURSING NOTE
Pt discharged back to [place] at this time.  Taken via private ambulance service via stretcher.  Vitals stable, see flow sheet.  Report called and spoke with Carroll

## 2024-07-11 NOTE — PROGRESS NOTES
07/11/24 0823   Discharge Planning   Living Arrangements Other (Comment)   Home or Post Acute Services Post acute facilities (Rehab/SNF/etc)   Type of Post Acute Facility Services Long term care   Expected Discharge Disposition Other   Does the patient need discharge transport arranged? Yes   RoundTrip coordination needed? Yes   Patient [facility]: [facility]     [facility] notified pt medically ready for discharge today.   1055 Requested DSC sent GF and AVS. Transport scheduled for 1400. Facility and family notified.

## 2024-07-12 ENCOUNTER — NURSING HOME VISIT (OUTPATIENT)
Dept: POST ACUTE CARE | Facility: EXTERNAL LOCATION | Age: 54
End: 2024-07-12
Payer: MEDICARE

## 2024-07-12 VITALS
OXYGEN SATURATION: 98 % | TEMPERATURE: 98 F | HEART RATE: 70 BPM | BODY MASS INDEX: 34.12 KG/M2 | WEIGHT: 286 LBS | RESPIRATION RATE: 18 BRPM | SYSTOLIC BLOOD PRESSURE: 132 MMHG | DIASTOLIC BLOOD PRESSURE: 68 MMHG

## 2024-07-12 DIAGNOSIS — F79 ACQUIRED INTELLECTUAL DISABILITY: ICD-10-CM

## 2024-07-12 DIAGNOSIS — G40.909 SEIZURE DISORDER (MULTI): ICD-10-CM

## 2024-07-12 DIAGNOSIS — Z93.2 ILEOSTOMY IN PLACE (MULTI): ICD-10-CM

## 2024-07-12 DIAGNOSIS — Z93.59 SUPRAPUBIC CATHETER (MULTI): ICD-10-CM

## 2024-07-12 DIAGNOSIS — K81.0 ACUTE CHOLECYSTITIS: Primary | ICD-10-CM

## 2024-07-12 PROCEDURE — 99310 SBSQ NF CARE HIGH MDM 45: CPT | Performed by: NURSE PRACTITIONER

## 2024-07-12 ASSESSMENT — ENCOUNTER SYMPTOMS
FATIGUE: 0
PALPITATIONS: 0
VOMITING: 0
DIFFICULTY URINATING: 0
ABDOMINAL PAIN: 0
CONSTIPATION: 0
CHILLS: 0
NAUSEA: 0
DIARRHEA: 0
SHORTNESS OF BREATH: 0
COUGH: 0
FEVER: 0

## 2024-07-12 NOTE — ASSESSMENT & PLAN NOTE
As above, ileostomy output appears to have changed since discharge from the hospital.   Case discussed with Dr Quezada.

## 2024-07-12 NOTE — PROGRESS NOTES
Subjective   Nato Downs is a 53 y.o. male returns from hospitalization due to acute cholecystitis and elevated LFT's.    HPI  He developed slight jaundice and labs showed elevated LFT's.  He was evaluated in the hospital and found to have gallstones on ultrasound.  HIDA scan showed nonvisualization of gallbladder.  Unable to tolerate MRCP.  He had lap ale taht converted to open cholecystectomy on 7/7.  Unable to complete intraop  cholangiogram.   Post op had SERENA drain which was subsequently removed prior to discharge.    Liver enzymes normalized prior to discharge and returned to the facility.    He is sitting up in chair, denies any complaints.    Per nursing staff he returned to the facility with ostomy output liquid brown to dark green.  Early this morning he had a large emesis and noted ot have a large amount of bright yellow drainage from ileostomy.  No abdominal pain.    After the emesis he ate his full breakfast with no recurrence of emesis and drainage from ostomy remains yellow but starting to darken to an peña, darker yellow.    Suprapubic cath functioning well.     Labs:  7/11  WBC: 13.6  Hgb: 10.2  Hct: 32.7  Platelet: 342    Na: 136  K: 3.6  Cl: 101  Co2: 28  BUN: 12  Creatinine: 1.03  GFR: 86  Alkaline Phos 103  ALT 49  AST 16  Total Bilirubin 0.5      Magnesium 1.82    MEDS:  Ascorbic acid  B complex  Benefiber  Vitamin D  Clonazepam routine  Cranberry  Keppra   Diazepam prn  Omeprazole  Trileptal  Oxybutynin  risperidone      Review of Systems   Constitutional:  Negative for chills, fatigue and fever.   Respiratory:  Negative for cough and shortness of breath.    Cardiovascular:  Negative for chest pain and palpitations.   Gastrointestinal:  Negative for abdominal pain, constipation, diarrhea, nausea and vomiting.   Genitourinary:  Negative for difficulty urinating.       Objective   /68   Pulse 70   Temp 36.7 °C (98 °F)   Resp 18   Wt 130 kg (286 lb)   SpO2 98%   BMI 34.12 kg/m²      Physical Exam  Constitutional:       General: He is not in acute distress.     Appearance: He is obese.   HENT:      Head: Normocephalic and atraumatic.   Eyes:      Conjunctiva/sclera: Conjunctivae normal.   Cardiovascular:      Rate and Rhythm: Normal rate and regular rhythm.   Pulmonary:      Effort: Pulmonary effort is normal. No respiratory distress.      Breath sounds: Normal breath sounds.   Abdominal:      General: Bowel sounds are normal. There is distension.      Palpations: Abdomen is soft.      Tenderness: There is no abdominal tenderness.      Comments: Ostomy draining bright yellow on initial exam, approx 45 minutes later the ostomy drainage is now a darker yellow, occasional small andrez of possible fecal material noted. no erythema noted around site.  3 lap sites en with staples intact.  Large transverse incision en with staples intact.    Genitourinary:     Comments: Suprapubic cath draining clear yellow  Musculoskeletal:         General: Normal range of motion.      Right lower leg: No edema.      Left lower leg: No edema.   Skin:     General: Skin is warm and dry.   Neurological:      General: No focal deficit present.      Mental Status: He is alert. Mental status is at baseline.   Psychiatric:         Mood and Affect: Mood normal.         Behavior: Behavior normal.         Assessment/Plan   Problem List Items Addressed This Visit       Acquired intellectual disability     Unable to live independently         Ileostomy in place (Multi)     As above, ileostomy output appears to have changed since discharge from the hospital.   Case discussed with Dr Quezada.           Seizure disorder (Multi)        Staff to monitor and notify for any recurrence of seizure activity           Suprapubic catheter (Multi)     Functioning well.     staff to monitor and notify for any changes.           Acute cholecystitis - Primary     S/p open cholecystectomy.   7/7.  He tolerated procedure without apparent  difficulty and returned to facility.  Per staff upon return from hospital ostomy output was loose stool to a dark green.  Early this morning prior to breakfast he had a large emesis (unknown description of emesis) followed by a large amount of bright yellow drainage from ostomy,  when I first examined the bag it was bright yellow, about 45 minutes later the drainage now appears to be darker and amount has decreased.    He ate breakfast this morning with no recurrence of emesis.  Mild pain with palpation at times, other palpation he denies any pain.            labs/meds/orders reviewed  staff to monitor and notify for any changes.  Hospital records reviewed.  Concern regarding changing ostomy output and emesis this morning.   He was able to tolerate breakfast with no recurrence of emesis.    Staff will monitor and notify if emesis recurs.   Case discussed with Dr Quezada, mckenzie closely montior, the ostomy output has started to decrease and not as bright yellow.  Will check labs 7/15.   Time for coordination of care was greater than 35 minutes with hospital chart review, visit and exam, discussion of treatment plan with patient and also discussion of case with staff.

## 2024-07-12 NOTE — ASSESSMENT & PLAN NOTE
S/p open cholecystectomy.   7/7.  He tolerated procedure without apparent difficulty and returned to facility.  Per staff upon return from hospital ostomy output was loose stool to a dark green.  Early this morning prior to breakfast he had a large emesis (unknown description of emesis) followed by a large amount of bright yellow drainage from ostomy,  when I first examined the bag it was bright yellow, about 45 minutes later the drainage now appears to be darker and amount has decreased.    He ate breakfast this morning with no recurrence of emesis.  Mild pain with palpation at times, other palpation he denies any pain.     Staff instructed to notify for any recurrence of emesis, any abdominal pain or excessive ostomy output and also to encourage fluids.

## 2024-07-12 NOTE — LETTER
Patient: Nato Downs  : 1970    Encounter Date: 2024    Subjective  Nato Downs is a 53 y.o. male returns from hospitalization due to acute cholecystitis and elevated LFT's.    HPI  He developed slight jaundice and labs showed elevated LFT's.  He was evaluated in the hospital and found to have gallstones on ultrasound.  HIDA scan showed nonvisualization of gallbladder.  Unable to tolerate MRCP.  He had lap ale taht converted to open cholecystectomy on .  Unable to complete intraop  cholangiogram.   Post op had SERENA drain which was subsequently removed prior to discharge.    Liver enzymes normalized prior to discharge and returned to the facility.    He is sitting up in chair, denies any complaints.    Per nursing staff he returned to the facility with ostomy output liquid brown to dark green.  Early this morning he had a large emesis and noted ot have a large amount of bright yellow drainage from ileostomy.  No abdominal pain.    After the emesis he ate his full breakfast with no recurrence of emesis and drainage from ostomy remains yellow but starting to darken to an peña, darker yellow.    Suprapubic cath functioning well.     Labs:    WBC: 13.6  Hgb: 10.2  Hct: 32.7  Platelet: 342    Na: 136  K: 3.6  Cl: 101  Co2: 28  BUN: 12  Creatinine: 1.03  GFR: 86  Alkaline Phos 103  ALT 49  AST 16  Total Bilirubin 0.5      Magnesium 1.82    MEDS:  Ascorbic acid  B complex  Benefiber  Vitamin D  Clonazepam routine  Cranberry  Keppra   Diazepam prn  Omeprazole  Trileptal  Oxybutynin  risperidone      Review of Systems   Constitutional:  Negative for chills, fatigue and fever.   Respiratory:  Negative for cough and shortness of breath.    Cardiovascular:  Negative for chest pain and palpitations.   Gastrointestinal:  Negative for abdominal pain, constipation, diarrhea, nausea and vomiting.   Genitourinary:  Negative for difficulty urinating.       Objective  /68   Pulse 70   Temp 36.7 °C (98  °F)   Resp 18   Wt 130 kg (286 lb)   SpO2 98%   BMI 34.12 kg/m²     Physical Exam  Constitutional:       General: He is not in acute distress.     Appearance: He is obese.   HENT:      Head: Normocephalic and atraumatic.   Eyes:      Conjunctiva/sclera: Conjunctivae normal.   Cardiovascular:      Rate and Rhythm: Normal rate and regular rhythm.   Pulmonary:      Effort: Pulmonary effort is normal. No respiratory distress.      Breath sounds: Normal breath sounds.   Abdominal:      General: Bowel sounds are normal. There is distension.      Palpations: Abdomen is soft.      Tenderness: There is no abdominal tenderness.      Comments: Ostomy draining bright yellow on initial exam, approx 45 minutes later the ostomy drainage is now a darker yellow, occasional small andrez of possible fecal material noted. no erythema noted around site.  3 lap sites en with staples intact.  Large transverse incision en with staples intact.    Genitourinary:     Comments: Suprapubic cath draining clear yellow  Musculoskeletal:         General: Normal range of motion.      Right lower leg: No edema.      Left lower leg: No edema.   Skin:     General: Skin is warm and dry.   Neurological:      General: No focal deficit present.      Mental Status: He is alert. Mental status is at baseline.   Psychiatric:         Mood and Affect: Mood normal.         Behavior: Behavior normal.         Assessment/Plan  Problem List Items Addressed This Visit       Acquired intellectual disability     Unable to live independently         Ileostomy in place (Multi)     As above, ileostomy output appears to have changed since discharge from the hospital.   Case discussed with Dr Quezada.           Seizure disorder (Multi)        Staff to monitor and notify for any recurrence of seizure activity           Suprapubic catheter (Multi)     Functioning well.     staff to monitor and notify for any changes.           Acute cholecystitis - Primary     S/p open  cholecystectomy.   7/7.  He tolerated procedure without apparent difficulty and returned to facility.  Per staff upon return from hospital ostomy output was loose stool to a dark green.  Early this morning prior to breakfast he had a large emesis (unknown description of emesis) followed by a large amount of bright yellow drainage from ostomy,  when I first examined the bag it was bright yellow, about 45 minutes later the drainage now appears to be darker and amount has decreased.    He ate breakfast this morning with no recurrence of emesis.  Mild pain with palpation at times, other palpation he denies any pain.            labs/meds/orders reviewed  staff to monitor and notify for any changes.  Hospital records reviewed.  Concern regarding changing ostomy output and emesis this morning.   He was able to tolerate breakfast with no recurrence of emesis.    Staff will monitor and notify if emesis recurs.   Case discussed with Dr Quezada, will closely montior, the ostomy output has started to decrease and not as bright yellow.  Will check labs 7/15.   Time for coordination of care was greater than 35 minutes with hospital chart review, visit and exam, discussion of treatment plan with patient and also discussion of case with staff.          Electronically Signed By: NASREEN Larios   7/12/24 11:42 AM

## 2024-07-16 ENCOUNTER — NURSING HOME VISIT (OUTPATIENT)
Dept: POST ACUTE CARE | Facility: EXTERNAL LOCATION | Age: 54
End: 2024-07-16
Payer: MEDICARE

## 2024-07-16 VITALS
WEIGHT: 286 LBS | RESPIRATION RATE: 20 BRPM | SYSTOLIC BLOOD PRESSURE: 141 MMHG | OXYGEN SATURATION: 96 % | HEART RATE: 90 BPM | TEMPERATURE: 96.8 F | DIASTOLIC BLOOD PRESSURE: 72 MMHG | BODY MASS INDEX: 34.12 KG/M2

## 2024-07-16 DIAGNOSIS — F79 ACQUIRED INTELLECTUAL DISABILITY: ICD-10-CM

## 2024-07-16 DIAGNOSIS — R53.81 PHYSICAL DEBILITY: ICD-10-CM

## 2024-07-16 DIAGNOSIS — D72.829 LEUKOCYTOSIS, UNSPECIFIED TYPE: ICD-10-CM

## 2024-07-16 DIAGNOSIS — K81.0 ACUTE CHOLECYSTITIS: Primary | ICD-10-CM

## 2024-07-16 DIAGNOSIS — Z93.59 SUPRAPUBIC CATHETER (MULTI): ICD-10-CM

## 2024-07-16 DIAGNOSIS — G40.909 SEIZURE DISORDER (MULTI): ICD-10-CM

## 2024-07-16 DIAGNOSIS — Z93.2 ILEOSTOMY IN PLACE (MULTI): ICD-10-CM

## 2024-07-16 PROCEDURE — 99309 SBSQ NF CARE MODERATE MDM 30: CPT | Performed by: NURSE PRACTITIONER

## 2024-07-16 ASSESSMENT — ENCOUNTER SYMPTOMS
FEVER: 0
DIARRHEA: 0
VOMITING: 0
NAUSEA: 0
CONSTIPATION: 0
PALPITATIONS: 0
CHILLS: 0
DIFFICULTY URINATING: 0
COUGH: 0
SHORTNESS OF BREATH: 0
FATIGUE: 0
ABDOMINAL PAIN: 0

## 2024-07-16 NOTE — PROGRESS NOTES
Subjective   Nato Downs is a 53 y.o. male here for weekly skilled visit.   HPI  Health problems reviewed and no acute concerns.  Participating in therapy and feeling stronger.  Ostomy output has returned to soft semi formed brown per his normal output.   Eating and drinking well.  Denies any complaints of pain.  No concerns per staff.  Labs reveiwed, WBC and platelets more elevated than on discharge.  He is starting to ambulate more in therapy.         Review of Systems   Constitutional:  Negative for chills, fatigue and fever.   Respiratory:  Negative for cough and shortness of breath.    Cardiovascular:  Negative for chest pain and palpitations.   Gastrointestinal:  Negative for abdominal pain, constipation, diarrhea, nausea and vomiting.   Genitourinary:  Negative for difficulty urinating.       Objective   /72   Pulse 90   Temp 36 °C (96.8 °F)   Resp 20   Wt 130 kg (286 lb)   SpO2 96%   BMI 34.12 kg/m²     Physical Exam  Constitutional:       General: He is not in acute distress.     Appearance: He is obese.   HENT:      Head: Normocephalic and atraumatic.   Eyes:      Conjunctiva/sclera: Conjunctivae normal.   Cardiovascular:      Rate and Rhythm: Normal rate and regular rhythm.   Pulmonary:      Effort: Pulmonary effort is normal. No respiratory distress.      Breath sounds: Normal breath sounds.   Abdominal:      General: Bowel sounds are normal. There is no distension.      Palpations: Abdomen is soft.      Tenderness: There is no abdominal tenderness.      Comments: Ostomy draining semi formed brown.   3 lap sites en with staples intact.  Large transverse incision en with staples intact.    Genitourinary:     Comments: Suprapubic cath draining clear yellow  Musculoskeletal:         General: Normal range of motion.      Right lower leg: No edema.      Left lower leg: No edema.   Skin:     General: Skin is warm and dry.   Neurological:      General: No focal deficit present.      Mental  Status: He is alert. Mental status is at baseline.   Psychiatric:         Mood and Affect: Mood normal.         Behavior: Behavior normal.         Assessment/Plan   Problem List Items Addressed This Visit       Acquired intellectual disability     Unable to live independently         Ileostomy in place (Multi)     Ostomy output has normalized to semi formed brown.          Physical debility     He is working with therapy and starting to ambulate again with assistance.  continue with therapy as able.           Seizure disorder (Multi)     No known recent seizures.    Staff to monitor and notify for any recurrence of seizure activity           Suprapubic catheter (Multi)     Functioning well.     staff to monitor and notify for any changes.           Acute cholecystitis - Primary     S/p open cholecystectomy.   7/7.  He tolerated procedure without apparent difficulty and returned to facility.   He is eating and drinking well, ostomy output is semi formed brown.  He denies any abdominal pain and will follow up with surgeon         Leukocytosis     WBC is more elevated from hospital discharge, no fever or chills and he clinically is improving.  Platelets are also more elevated, potentially from inflammation as he is recovering from surgery.  CBC ordered for 7/17 to monitor trend.          labs/meds/orders reviewed  staff to monitor and notify for any changes.  continue with therapy as able.  Follow up with surgeon as scheduled.    Repeat CBC 7/17 due to leukocytosis and thrombocytosis which trended up from hospitalization discharge.

## 2024-07-16 NOTE — ASSESSMENT & PLAN NOTE
S/p open cholecystectomy.   7/7.  He tolerated procedure without apparent difficulty and returned to facility.   He is eating and drinking well, ostomy output is semi formed brown.  He denies any abdominal pain and will follow up with surgeon

## 2024-07-16 NOTE — LETTER
Patient: Nato Downs  : 1970    Encounter Date: 2024    Subjective  Nato Downs is a 53 y.o. male here for weekly skilled visit.   HPI  Health problems reviewed and no acute concerns.  Participating in therapy and feeling stronger.  Ostomy output has returned to soft semi formed brown per his normal output.   Eating and drinking well.  Denies any complaints of pain.  No concerns per staff.  Labs reveiwed, WBC and platelets more elevated than on discharge.  He is starting to ambulate more in therapy.         Review of Systems   Constitutional:  Negative for chills, fatigue and fever.   Respiratory:  Negative for cough and shortness of breath.    Cardiovascular:  Negative for chest pain and palpitations.   Gastrointestinal:  Negative for abdominal pain, constipation, diarrhea, nausea and vomiting.   Genitourinary:  Negative for difficulty urinating.       Objective  /72   Pulse 90   Temp 36 °C (96.8 °F)   Resp 20   Wt 130 kg (286 lb)   SpO2 96%   BMI 34.12 kg/m²     Physical Exam  Constitutional:       General: He is not in acute distress.     Appearance: He is obese.   HENT:      Head: Normocephalic and atraumatic.   Eyes:      Conjunctiva/sclera: Conjunctivae normal.   Cardiovascular:      Rate and Rhythm: Normal rate and regular rhythm.   Pulmonary:      Effort: Pulmonary effort is normal. No respiratory distress.      Breath sounds: Normal breath sounds.   Abdominal:      General: Bowel sounds are normal. There is no distension.      Palpations: Abdomen is soft.      Tenderness: There is no abdominal tenderness.      Comments: Ostomy draining semi formed brown.   3 lap sites en with staples intact.  Large transverse incision en with staples intact.    Genitourinary:     Comments: Suprapubic cath draining clear yellow  Musculoskeletal:         General: Normal range of motion.      Right lower leg: No edema.      Left lower leg: No edema.   Skin:     General: Skin is warm and dry.    Neurological:      General: No focal deficit present.      Mental Status: He is alert. Mental status is at baseline.   Psychiatric:         Mood and Affect: Mood normal.         Behavior: Behavior normal.         Assessment/Plan  Problem List Items Addressed This Visit       Acquired intellectual disability     Unable to live independently         Ileostomy in place (Multi)     Ostomy output has normalized to semi formed brown.          Physical debility     He is working with therapy and starting to ambulate again with assistance.  continue with therapy as able.           Seizure disorder (Multi)     No known recent seizures.    Staff to monitor and notify for any recurrence of seizure activity           Suprapubic catheter (Multi)     Functioning well.     staff to monitor and notify for any changes.           Acute cholecystitis - Primary     S/p open cholecystectomy.   7/7.  He tolerated procedure without apparent difficulty and returned to facility.   He is eating and drinking well, ostomy output is semi formed brown.  He denies any abdominal pain and will follow up with surgeon         Leukocytosis     WBC is more elevated from hospital discharge, no fever or chills and he clinically is improving.  Platelets are also more elevated, potentially from inflammation as he is recovering from surgery.  CBC ordered for 7/17 to monitor trend.          labs/meds/orders reviewed  staff to monitor and notify for any changes.  continue with therapy as able.  Follow up with surgeon as scheduled.    Repeat CBC 7/17 due to leukocytosis and thrombocytosis which trended up from hospitalization discharge.           Electronically Signed By: NASREEN Larios   7/16/24 10:35 AM

## 2024-07-16 NOTE — ASSESSMENT & PLAN NOTE
WBC is more elevated from hospital discharge, no fever or chills and he clinically is improving.  Platelets are also more elevated, potentially from inflammation as he is recovering from surgery.  CBC ordered for 7/17 to monitor trend.

## 2024-07-16 NOTE — ASSESSMENT & PLAN NOTE
He is working with therapy and starting to ambulate again with assistance.  continue with therapy as able.

## 2024-07-18 ENCOUNTER — APPOINTMENT (OUTPATIENT)
Dept: SURGERY | Facility: CLINIC | Age: 54
End: 2024-07-18
Payer: MEDICARE

## 2024-07-18 VITALS
SYSTOLIC BLOOD PRESSURE: 119 MMHG | DIASTOLIC BLOOD PRESSURE: 77 MMHG | HEART RATE: 88 BPM | TEMPERATURE: 98 F | OXYGEN SATURATION: 96 % | RESPIRATION RATE: 18 BRPM

## 2024-07-18 DIAGNOSIS — K80.20 CALCULUS OF GALLBLADDER WITHOUT CHOLECYSTITIS WITHOUT OBSTRUCTION: Primary | ICD-10-CM

## 2024-07-18 PROCEDURE — 99024 POSTOP FOLLOW-UP VISIT: CPT | Performed by: SURGERY

## 2024-07-18 NOTE — PROGRESS NOTES
Chief complaint/    Postop check        HPI/    53-year-old male returns in follow-up    Patient recently hospitalized for epigastric and right upper quadrant pain.  He had gallstones.  There was a question of mild liver function test abnormality suggestive of possible passage of the stone    Patient underwent cholecystectomy.  Procedure had to be performed open given fibrosis of the gallbladder and a prior subtotal colectomy with ileostomy.    Cholangiogram could not be accomplished    Patient is currently at his regular nursing facility.  He is performing his regular activities.  Family reports he is eating well and performing all his regular activities        Physical exam/    Sclera clear    Abdomen soft and nontender.  Wound is clean and dry without redness or drainage.  Staples are intact        Assessment/    Doing well        Plan/    I give the staples 3-4 more days before taking them out, otherwise patient looks great    Cholangiography could not be performed at the time of surgery.  Patient is at risk for retained CBD stone

## 2024-07-19 ENCOUNTER — NURSING HOME VISIT (OUTPATIENT)
Dept: POST ACUTE CARE | Facility: EXTERNAL LOCATION | Age: 54
End: 2024-07-19
Payer: MEDICARE

## 2024-07-19 DIAGNOSIS — Z93.59 SUPRAPUBIC CATHETER (MULTI): ICD-10-CM

## 2024-07-19 DIAGNOSIS — K59.2 NEUROGENIC BOWEL: ICD-10-CM

## 2024-07-19 DIAGNOSIS — F79 ACQUIRED INTELLECTUAL DISABILITY: ICD-10-CM

## 2024-07-19 DIAGNOSIS — K81.0 ACUTE CHOLECYSTITIS: ICD-10-CM

## 2024-07-19 DIAGNOSIS — F03.B0 MODERATE DEMENTIA WITHOUT BEHAVIORAL DISTURBANCE, PSYCHOTIC DISTURBANCE, MOOD DISTURBANCE, OR ANXIETY, UNSPECIFIED DEMENTIA TYPE (MULTI): ICD-10-CM

## 2024-07-19 DIAGNOSIS — G40.909 SEIZURE DISORDER (MULTI): ICD-10-CM

## 2024-07-19 DIAGNOSIS — N31.9 NEUROGENIC BLADDER: ICD-10-CM

## 2024-07-19 DIAGNOSIS — Z93.2 ILEOSTOMY IN PLACE (MULTI): ICD-10-CM

## 2024-07-19 DIAGNOSIS — Z90.49 HX OF CHOLECYSTECTOMY: Primary | ICD-10-CM

## 2024-07-19 PROCEDURE — 99306 1ST NF CARE HIGH MDM 50: CPT | Performed by: INTERNAL MEDICINE

## 2024-07-19 NOTE — Clinical Note
Patient: Nato Downs  : 1970    Encounter Date: 2024    No notes on file    Electronically Signed By: Amilcar Quezada MD   24 11:51 AM

## 2024-07-20 VITALS
DIASTOLIC BLOOD PRESSURE: 70 MMHG | BODY MASS INDEX: 34.12 KG/M2 | SYSTOLIC BLOOD PRESSURE: 134 MMHG | TEMPERATURE: 98 F | OXYGEN SATURATION: 93 % | WEIGHT: 286 LBS | RESPIRATION RATE: 18 BRPM | HEART RATE: 86 BPM

## 2024-07-20 NOTE — H&P (VIEW-ONLY)
Subjective   Patient ID: Nato Downs is a 53 y.o. male who is long term resident being seen and evaluated for multiple medical problems.    HPI   Nato Downs is a 53 y.o. male returns from hospitalization due to acute cholecystitis and elevated LFT's.    HPI  He developed slight jaundice and labs showed elevated LFT's.  He was evaluated in the hospital and found to have gallstones on ultrasound.  HIDA scan showed nonvisualization of gallbladder.  Unable to tolerate MRCP.  He had lap ale taht converted to open cholecystectomy on 7/7.  Unable to complete intraop  cholangiogram.   Post op had SERENA drain which was subsequently removed prior to discharge.    Liver enzymes normalized prior to discharge and returned to the facility.    He is sitting up in chair, denies any complaints.     Per nursing staff he returned to the facility with ostomy output liquid brown to dark green.  Early this morning he had a large emesis and noted ot have a large amount of bright yellow drainage from ileostomy.  No abdominal pain.    After the emesis he ate his full breakfast with no recurrence of emesis and drainage from ostomy remains yellow but starting to darken to an peña, darker yellow.    Suprapubic cath functioning well.    The patient is resting comfortably in his bed in no distress.  He is in good spirits.  His ostomy output is started to normalize.  The patient has follow-up ERCP planned at the end of July as intraoperative cholangiogram during open cholecystectomy could not be performed adequately.     Labs:  7/11  WBC: 13.6  Hgb: 10.2  Hct: 32.7  Platelet: 342     Na: 136  K: 3.6  Cl: 101  Co2: 28  BUN: 12  Creatinine: 1.03  GFR: 86  Alkaline Phos 103  ALT 49  AST 16  Total Bilirubin 0.5        Magnesium 1.82    Albumin 3.6     MEDS:  Ascorbic acid  B complex  Benefiber  Vitamin D  Clonazepam routine  Cranberry  Keppra   Diazepam prn  Omeprazole  Trileptal  Oxybutynin  risperidone     Review of Systems    Constitutional:  Negative for chills, fatigue and fever.   Respiratory:  Negative for cough and shortness of breath.    Cardiovascular:  Negative for chest pain and palpitations.   Gastrointestinal:  Negative for abdominal pain, constipation, diarrhea, nausea and vomiting.   Genitourinary:  Negative for difficulty urinating.       Objective   /70   Pulse 86   Temp 36.7 °C (98 °F)   Resp 18   Wt 130 kg (286 lb)   SpO2 93%   BMI 34.12 kg/m²     Physical Exam  Constitutional:       General: He is not in acute distress.     Appearance: He is obese.   HENT:      Head: Normocephalic and atraumatic.   Eyes:      Conjunctiva/sclera: Conjunctivae normal.   Cardiovascular:      Rate and Rhythm: Normal rate and regular rhythm.   Pulmonary:      Effort: Pulmonary effort is normal. No respiratory distress.      Breath sounds: Normal breath sounds.   Abdominal:      General: Bowel sounds are normal. There is no distension.      Palpations: Abdomen is soft.      Tenderness: There is no abdominal tenderness.      Comments: Ostomy draining semi formed brown.   3 lap sites en with staples intact.  Large transverse incision en with staples intact.    Genitourinary:     Comments: Suprapubic cath draining clear yellow  Musculoskeletal:         General: Normal range of motion.      Right lower leg: No edema.      Left lower leg: No edema.   Skin:     General: Skin is warm and dry.   Neurological:      General: No focal deficit present.      Mental Status: He is alert. Mental status is at baseline.   Psychiatric:         Mood and Affect: Mood normal.         Behavior: Behavior normal.         Assessment/Plan   Problem List Items Addressed This Visit             ICD-10-CM    Acquired intellectual disability F79    Ileostomy in place (Multi) Z93.2    Neurogenic bladder N31.9    Neurogenic bowel K59.2    Seizure disorder (Multi) G40.909    Suprapubic catheter (Multi) Z93.59    Moderate dementia without behavioral disturbance,  psychotic disturbance, mood disturbance, or anxiety, unspecified dementia type (Multi) F03.B0    Acute cholecystitis K81.0    Hx of cholecystectomy - Primary Z90.49     8.  We will continue with rehabilitative restorative and supportive care as the patient tolerance    B.  The patient will have close outpatient follow-up with the surgery team for routine postop care.  As mentioned above the patient is scheduled for ERCP to exclude any retained common bile duct stones after cholecystectomy.    C.  Laboratory examinations will continue to be monitored on an ongoing as-needed basis    D.  The patient's prognosis is guarded.

## 2024-07-22 LAB
LABORATORY COMMENT REPORT: NORMAL
PATH REPORT.FINAL DX SPEC: NORMAL
PATH REPORT.GROSS SPEC: NORMAL
PATH REPORT.RELEVANT HX SPEC: NORMAL
PATH REPORT.TOTAL CANCER: NORMAL

## 2024-07-23 ENCOUNTER — NURSING HOME VISIT (OUTPATIENT)
Dept: POST ACUTE CARE | Facility: EXTERNAL LOCATION | Age: 54
End: 2024-07-23
Payer: MEDICARE

## 2024-07-23 VITALS
OXYGEN SATURATION: 96 % | DIASTOLIC BLOOD PRESSURE: 76 MMHG | WEIGHT: 287 LBS | SYSTOLIC BLOOD PRESSURE: 132 MMHG | BODY MASS INDEX: 34.24 KG/M2 | TEMPERATURE: 98.1 F | HEART RATE: 74 BPM | RESPIRATION RATE: 18 BRPM

## 2024-07-23 DIAGNOSIS — R53.81 PHYSICAL DEBILITY: ICD-10-CM

## 2024-07-23 DIAGNOSIS — N31.9 NEUROGENIC BLADDER: ICD-10-CM

## 2024-07-23 DIAGNOSIS — D72.829 LEUKOCYTOSIS, UNSPECIFIED TYPE: ICD-10-CM

## 2024-07-23 DIAGNOSIS — K81.0 ACUTE CHOLECYSTITIS: Primary | ICD-10-CM

## 2024-07-23 DIAGNOSIS — G40.909 SEIZURE DISORDER (MULTI): ICD-10-CM

## 2024-07-23 PROCEDURE — 99309 SBSQ NF CARE MODERATE MDM 30: CPT | Performed by: NURSE PRACTITIONER

## 2024-07-23 ASSESSMENT — ENCOUNTER SYMPTOMS
ABDOMINAL PAIN: 0
CONSTIPATION: 0
CHILLS: 0
FATIGUE: 0
DIARRHEA: 0
DIFFICULTY URINATING: 0
PALPITATIONS: 0
COUGH: 0
SHORTNESS OF BREATH: 0
FEVER: 0
VOMITING: 0
NAUSEA: 0

## 2024-07-23 NOTE — ASSESSMENT & PLAN NOTE
WBC decreased 7/17,, no fever or chills and he clinically is improving.  Platelets aremore elevated, potentially from inflammation as he is recovering from surgery.  CBC ordered for 7/25 to monitor trend and also due to mild-moderate erythema around incision

## 2024-07-23 NOTE — PROGRESS NOTES
Subjective   Nato Downs is a 53 y.o. male here for weekly skilled visit.   HPI  Health problems reviewed and no acute concerns.  Participating in therapy and feeling stronger.  Ostomy output has returned to soft semi formed brown per his normal output.   Eating and drinking well.  Denies any complaints of pain.  No concerns per staff.   Staples removed yesterday and noted to have mild-moderate erythema around incision, no warmth or fevers.    He is starting to ambulate more in therapy.         Review of Systems   Constitutional:  Negative for chills, fatigue and fever.   Respiratory:  Negative for cough and shortness of breath.    Cardiovascular:  Negative for chest pain and palpitations.   Gastrointestinal:  Negative for abdominal pain, constipation, diarrhea, nausea and vomiting.   Genitourinary:  Negative for difficulty urinating.       Objective   /76   Pulse 74   Temp 36.7 °C (98.1 °F)   Resp 18   Wt 130 kg (287 lb)   SpO2 96%   BMI 34.24 kg/m²     Physical Exam  Constitutional:       General: He is not in acute distress.     Appearance: He is obese.   HENT:      Head: Normocephalic and atraumatic.   Eyes:      Conjunctiva/sclera: Conjunctivae normal.   Cardiovascular:      Rate and Rhythm: Normal rate and regular rhythm.   Pulmonary:      Effort: Pulmonary effort is normal. No respiratory distress.      Breath sounds: Normal breath sounds.   Abdominal:      General: Bowel sounds are normal. There is no distension.      Palpations: Abdomen is soft.      Tenderness: There is no abdominal tenderness.      Comments: Ostomy draining semi formed brown.   3 lap sites en with staples intact.  Large transverse incision en with staples intact.    Genitourinary:     Comments: Suprapubic cath draining clear yellow  Musculoskeletal:         General: Normal range of motion.      Right lower leg: No edema.      Left lower leg: No edema.   Skin:     General: Skin is warm and dry.   Neurological:       General: No focal deficit present.      Mental Status: He is alert. Mental status is at baseline.   Psychiatric:         Mood and Affect: Mood normal.         Behavior: Behavior normal.         Assessment/Plan   Problem List Items Addressed This Visit       Neurogenic bladder     Suprapubic cath.           Physical debility     He is working with therapy and starting to ambulate again with assistance.  continue with therapy as able.           Seizure disorder (Multi)     No known recent seizures.    Staff to monitor and notify for any recurrence of seizure activity           Acute cholecystitis - Primary     S/p open cholecystectomy.   7/7.  He tolerated procedure without apparent difficulty and returned to facility.   He is eating and drinking well, ostomy output per his norm.  He denies any abdominal pain and had follow up with surgeon last week.  Staples were removed yesterday, mild-moderate erythema noted around incision, no warmth, will have staff closely monitor for any worsening of sx, notify surgeon and will recheck labs.          Leukocytosis     WBC decreased 7/17,, no fever or chills and he clinically is improving.  Platelets aremore elevated, potentially from inflammation as he is recovering from surgery.  CBC ordered for 7/25 to monitor trend and also due to mild-moderate erythema around incision          labs/meds/orders reviewed  staff to monitor and notify for any changes.  continue with therapy as able.  Staff to closely monitor for any worsening of erythema to incision and also to notify surgeon.  No pain.    Repeat CBC 7/25 due to thrombocytosis which trended up from hospitalization discharge.

## 2024-07-23 NOTE — ASSESSMENT & PLAN NOTE
S/p open cholecystectomy.   7/7.  He tolerated procedure without apparent difficulty and returned to facility.   He is eating and drinking well, ostomy output per his norm.  He denies any abdominal pain and had follow up with surgeon last week.  Staples were removed yesterday, mild-moderate erythema noted around incision, no warmth, will have staff closely monitor for any worsening of sx, notify surgeon and will recheck labs.

## 2024-07-25 PROBLEM — Z90.49 HX OF CHOLECYSTECTOMY: Status: ACTIVE | Noted: 2024-07-25

## 2024-07-25 PROBLEM — K91.86 RETAINED GALLSTONES FOLLOWING OPEN CHOLECYSTECTOMY: Status: ACTIVE | Noted: 2024-07-25

## 2024-07-25 ASSESSMENT — ENCOUNTER SYMPTOMS
FATIGUE: 0
CHILLS: 0
COUGH: 0
ABDOMINAL PAIN: 0
FEVER: 0
DIARRHEA: 0
CONSTIPATION: 0
DIFFICULTY URINATING: 0
VOMITING: 0
SHORTNESS OF BREATH: 0
PALPITATIONS: 0
NAUSEA: 0

## 2024-08-01 ENCOUNTER — ANESTHESIA (OUTPATIENT)
Dept: GASTROENTEROLOGY | Facility: HOSPITAL | Age: 54
End: 2024-08-01
Payer: MEDICARE

## 2024-08-01 ENCOUNTER — ANESTHESIA EVENT (OUTPATIENT)
Dept: GASTROENTEROLOGY | Facility: HOSPITAL | Age: 54
End: 2024-08-01
Payer: MEDICARE

## 2024-08-01 ENCOUNTER — HOSPITAL ENCOUNTER (OUTPATIENT)
Dept: GASTROENTEROLOGY | Facility: HOSPITAL | Age: 54
Setting detail: OUTPATIENT SURGERY
Discharge: HOME | End: 2024-08-01
Payer: MEDICARE

## 2024-08-01 VITALS
SYSTOLIC BLOOD PRESSURE: 155 MMHG | TEMPERATURE: 97 F | HEART RATE: 82 BPM | BODY MASS INDEX: 34.1 KG/M2 | WEIGHT: 280 LBS | HEIGHT: 76 IN | DIASTOLIC BLOOD PRESSURE: 72 MMHG | RESPIRATION RATE: 18 BRPM | OXYGEN SATURATION: 91 %

## 2024-08-01 DIAGNOSIS — K81.0 ACUTE CHOLECYSTITIS: Primary | ICD-10-CM

## 2024-08-01 PROCEDURE — 3700000002 HC GENERAL ANESTHESIA TIME - EACH INCREMENTAL 1 MINUTE

## 2024-08-01 PROCEDURE — 2500000005 HC RX 250 GENERAL PHARMACY W/O HCPCS: Performed by: NURSE ANESTHETIST, CERTIFIED REGISTERED

## 2024-08-01 PROCEDURE — 2500000004 HC RX 250 GENERAL PHARMACY W/ HCPCS (ALT 636 FOR OP/ED): Performed by: NURSE ANESTHETIST, CERTIFIED REGISTERED

## 2024-08-01 PROCEDURE — 43239 EGD BIOPSY SINGLE/MULTIPLE: CPT | Performed by: INTERNAL MEDICINE

## 2024-08-01 PROCEDURE — 7100000002 HC RECOVERY ROOM TIME - EACH INCREMENTAL 1 MINUTE

## 2024-08-01 PROCEDURE — 2550000001 HC RX 255 CONTRASTS: Performed by: INTERNAL MEDICINE

## 2024-08-01 PROCEDURE — 43237 ENDOSCOPIC US EXAM ESOPH: CPT | Performed by: INTERNAL MEDICINE

## 2024-08-01 PROCEDURE — 88305 TISSUE EXAM BY PATHOLOGIST: CPT | Mod: TC,STJLAB | Performed by: INTERNAL MEDICINE

## 2024-08-01 PROCEDURE — 7100000010 HC PHASE TWO TIME - EACH INCREMENTAL 1 MINUTE

## 2024-08-01 PROCEDURE — 2720000007 HC OR 272 NO HCPCS

## 2024-08-01 PROCEDURE — 7100000009 HC PHASE TWO TIME - INITIAL BASE CHARGE

## 2024-08-01 PROCEDURE — 7100000001 HC RECOVERY ROOM TIME - INITIAL BASE CHARGE

## 2024-08-01 PROCEDURE — 3700000001 HC GENERAL ANESTHESIA TIME - INITIAL BASE CHARGE

## 2024-08-01 PROCEDURE — 2500000002 HC RX 250 W HCPCS SELF ADMINISTERED DRUGS (ALT 637 FOR MEDICARE OP, ALT 636 FOR OP/ED): Performed by: ANESTHESIOLOGY

## 2024-08-01 PROCEDURE — C1769 GUIDE WIRE: HCPCS

## 2024-08-01 RX ORDER — LIDOCAINE HYDROCHLORIDE 20 MG/ML
INJECTION, SOLUTION INFILTRATION; PERINEURAL AS NEEDED
Status: DISCONTINUED | OUTPATIENT
Start: 2024-08-01 | End: 2024-08-01

## 2024-08-01 RX ORDER — HYDROMORPHONE HYDROCHLORIDE 0.2 MG/ML
0.2 INJECTION INTRAMUSCULAR; INTRAVENOUS; SUBCUTANEOUS EVERY 5 MIN PRN
Status: DISCONTINUED | OUTPATIENT
Start: 2024-08-01 | End: 2024-08-02 | Stop reason: HOSPADM

## 2024-08-01 RX ORDER — PROPOFOL 10 MG/ML
INJECTION, EMULSION INTRAVENOUS AS NEEDED
Status: DISCONTINUED | OUTPATIENT
Start: 2024-08-01 | End: 2024-08-01

## 2024-08-01 RX ORDER — OXYCODONE HYDROCHLORIDE 5 MG/1
5 TABLET ORAL EVERY 4 HOURS PRN
Status: DISCONTINUED | OUTPATIENT
Start: 2024-08-01 | End: 2024-08-02 | Stop reason: HOSPADM

## 2024-08-01 RX ORDER — ACETAMINOPHEN 325 MG/1
975 TABLET ORAL ONCE
Status: DISCONTINUED | OUTPATIENT
Start: 2024-08-01 | End: 2024-08-02 | Stop reason: HOSPADM

## 2024-08-01 RX ORDER — HYDRALAZINE HYDROCHLORIDE 20 MG/ML
5 INJECTION INTRAMUSCULAR; INTRAVENOUS EVERY 30 MIN PRN
Status: DISCONTINUED | OUTPATIENT
Start: 2024-08-01 | End: 2024-08-02 | Stop reason: HOSPADM

## 2024-08-01 RX ORDER — FENTANYL CITRATE 50 UG/ML
INJECTION, SOLUTION INTRAMUSCULAR; INTRAVENOUS AS NEEDED
Status: DISCONTINUED | OUTPATIENT
Start: 2024-08-01 | End: 2024-08-01

## 2024-08-01 RX ORDER — SODIUM CHLORIDE, SODIUM LACTATE, POTASSIUM CHLORIDE, CALCIUM CHLORIDE 600; 310; 30; 20 MG/100ML; MG/100ML; MG/100ML; MG/100ML
100 INJECTION, SOLUTION INTRAVENOUS CONTINUOUS
Status: DISCONTINUED | OUTPATIENT
Start: 2024-08-01 | End: 2024-08-02 | Stop reason: HOSPADM

## 2024-08-01 RX ORDER — SODIUM CHLORIDE, SODIUM LACTATE, POTASSIUM CHLORIDE, CALCIUM CHLORIDE 600; 310; 30; 20 MG/100ML; MG/100ML; MG/100ML; MG/100ML
20 INJECTION, SOLUTION INTRAVENOUS CONTINUOUS
Status: DISCONTINUED | OUTPATIENT
Start: 2024-08-01 | End: 2024-08-02 | Stop reason: HOSPADM

## 2024-08-01 RX ORDER — ROCURONIUM BROMIDE 10 MG/ML
INJECTION, SOLUTION INTRAVENOUS AS NEEDED
Status: DISCONTINUED | OUTPATIENT
Start: 2024-08-01 | End: 2024-08-01

## 2024-08-01 RX ORDER — OXYCODONE AND ACETAMINOPHEN 5; 325 MG/1; MG/1
1 TABLET ORAL EVERY 4 HOURS PRN
Status: DISCONTINUED | OUTPATIENT
Start: 2024-08-01 | End: 2024-08-02 | Stop reason: HOSPADM

## 2024-08-01 RX ORDER — ALBUTEROL SULFATE 0.83 MG/ML
2.5 SOLUTION RESPIRATORY (INHALATION) ONCE AS NEEDED
Status: COMPLETED | OUTPATIENT
Start: 2024-08-01 | End: 2024-08-01

## 2024-08-01 RX ORDER — LIDOCAINE HYDROCHLORIDE 10 MG/ML
0.1 INJECTION INFILTRATION; PERINEURAL ONCE
Status: DISCONTINUED | OUTPATIENT
Start: 2024-08-01 | End: 2024-08-02 | Stop reason: HOSPADM

## 2024-08-01 RX ORDER — GLYCOPYRROLATE 0.2 MG/ML
INJECTION INTRAMUSCULAR; INTRAVENOUS AS NEEDED
Status: DISCONTINUED | OUTPATIENT
Start: 2024-08-01 | End: 2024-08-01

## 2024-08-01 RX ORDER — ONDANSETRON HYDROCHLORIDE 2 MG/ML
4 INJECTION, SOLUTION INTRAVENOUS ONCE AS NEEDED
Status: DISCONTINUED | OUTPATIENT
Start: 2024-08-01 | End: 2024-08-02 | Stop reason: HOSPADM

## 2024-08-01 RX ORDER — LABETALOL HYDROCHLORIDE 5 MG/ML
5 INJECTION, SOLUTION INTRAVENOUS ONCE AS NEEDED
Status: DISCONTINUED | OUTPATIENT
Start: 2024-08-01 | End: 2024-08-02 | Stop reason: HOSPADM

## 2024-08-01 RX ORDER — ONDANSETRON HYDROCHLORIDE 2 MG/ML
INJECTION, SOLUTION INTRAVENOUS AS NEEDED
Status: DISCONTINUED | OUTPATIENT
Start: 2024-08-01 | End: 2024-08-01

## 2024-08-01 RX ORDER — DIPHENHYDRAMINE HYDROCHLORIDE 50 MG/ML
12.5 INJECTION INTRAMUSCULAR; INTRAVENOUS ONCE AS NEEDED
Status: DISCONTINUED | OUTPATIENT
Start: 2024-08-01 | End: 2024-08-02 | Stop reason: HOSPADM

## 2024-08-01 SDOH — HEALTH STABILITY: MENTAL HEALTH: CURRENT SMOKER: 0

## 2024-08-01 ASSESSMENT — PAIN SCALES - GENERAL
PAINLEVEL_OUTOF10: 0 - NO PAIN
PAINLEVEL_OUTOF10: 1
PAINLEVEL_OUTOF10: 0 - NO PAIN

## 2024-08-01 ASSESSMENT — PAIN - FUNCTIONAL ASSESSMENT
PAIN_FUNCTIONAL_ASSESSMENT: 0-10

## 2024-08-01 NOTE — ANESTHESIA POSTPROCEDURE EVALUATION
Patient: Nato Downs    Procedure Summary       Date: 08/01/24 Room / Location: [facility]    Anesthesia Start: 0842 Anesthesia Stop: 0933    Procedures:       ENDOSCOPIC ULTRASOUND (UPPER)      ERCP Diagnosis:       Acute cholecystitis      Acute cholecystitis    Scheduled Providers: Shikha Rodriguez MD Responsible Provider: Heber Rodriguez MD    Anesthesia Type: general ASA Status: 3            Anesthesia Type: general    Vitals Value Taken Time   /65 08/01/24 0945   Temp 36.5 °C (97.7 °F) 08/01/24 0930   Pulse 91 08/01/24 0956   Resp 23 08/01/24 0956   SpO2 96 % 08/01/24 0956   Vitals shown include unfiled device data.    Anesthesia Post Evaluation    Patient location during evaluation: PACU  Patient participation: complete - patient participated  Level of consciousness: awake and alert  Pain management: satisfactory to patient  Airway patency: patent  Cardiovascular status: acceptable  Respiratory status: acceptable  Hydration status: acceptable  Postoperative Nausea and Vomiting: none        No notable events documented.

## 2024-08-01 NOTE — DISCHARGE INSTRUCTIONS

## 2024-08-01 NOTE — ANESTHESIA PROCEDURE NOTES
Airway  Date/Time: 8/1/2024 8:45 AM  Urgency: elective    Airway not difficult    Staffing  Performed: CRNA   Authorized by: Heber Rodriguez MD    Performed by: CHARLA Magaña-HODA  Patient location during procedure: OR    Indications and Patient Condition  Indications for airway management: anesthesia and airway protection  Spontaneous ventilation: present  Sedation level: deep  Preoxygenated: yes  Patient position: sniffing  MILS maintained throughout  Mask difficulty assessment: 1 - vent by mask    Final Airway Details  Final airway type: endotracheal airway      Successful airway: ETT  Cuffed: yes   Successful intubation technique: video laryngoscopy  Blade size: #4  ETT size (mm): 7.5  Cormack-Lehane Classification: grade I - full view of glottis  Placement verified by: chest auscultation and capnometry   Measured from: lips  ETT to lips (cm): 22  Number of attempts at approach: 1

## 2024-08-01 NOTE — ANESTHESIA PREPROCEDURE EVALUATION
Patient: Nato Downs    Procedure Information       Date/Time: 08/01/24 0830    Scheduled providers: Shikha Rodriguez MD    Procedures:       ENDOSCOPIC ULTRASOUND (UPPER)      ERCP    Location: [facility]            Relevant Problems   Anesthesia   (+) Complex dental caries      Neuro   (+) Anxiety   (+) Moderate dementia without behavioral disturbance, psychotic disturbance, mood disturbance, or anxiety, unspecified dementia type (Multi)   (+) Seizure disorder (Multi)      /Renal   (+) Urinary tract infection associated with catheterization of urinary tract (CMS-HCC)      Liver   (+) Acute cholecystitis      Endocrine   (+) Obesity      ID   (+) COVID-19 virus infection   (+) Complex dental caries   (+) Urinary tract infection associated with catheterization of urinary tract (CMS-HCC)       Clinical information reviewed:   Tobacco  Allergies  Meds   Med Hx  Surg Hx   Fam Hx  Soc Hx        NPO Detail:  NPO/Void Status  Date of Last Liquid: 08/01/24  Time of Last Liquid: 0600  Date of Last Solid: 07/31/24  Time of Last Solid: 1800  Time of Last Void: 0828         Physical Exam    Airway  Mallampati: II  TM distance: >3 FB  Neck ROM: full     Cardiovascular   Rhythm: regular  Rate: normal     Dental    Pulmonary   Breath sounds clear to auscultation     Abdominal            Anesthesia Plan    History of general anesthesia?: yes  History of complications of general anesthesia?: no    ASA 3     general     The patient is not a current smoker.    intravenous induction   Anesthetic plan and risks discussed with patient.    Plan discussed with CRNA.

## 2024-08-07 LAB
LABORATORY COMMENT REPORT: NORMAL
PATH REPORT.FINAL DX SPEC: NORMAL
PATH REPORT.GROSS SPEC: NORMAL
PATH REPORT.TOTAL CANCER: NORMAL

## 2024-08-13 ENCOUNTER — NURSING HOME VISIT (OUTPATIENT)
Dept: POST ACUTE CARE | Facility: EXTERNAL LOCATION | Age: 54
End: 2024-08-13
Payer: MEDICARE

## 2024-08-13 VITALS
OXYGEN SATURATION: 97 % | RESPIRATION RATE: 18 BRPM | BODY MASS INDEX: 34.81 KG/M2 | SYSTOLIC BLOOD PRESSURE: 128 MMHG | DIASTOLIC BLOOD PRESSURE: 84 MMHG | WEIGHT: 286 LBS | TEMPERATURE: 98 F | HEART RATE: 82 BPM

## 2024-08-13 DIAGNOSIS — Z93.2 ILEOSTOMY IN PLACE (MULTI): ICD-10-CM

## 2024-08-13 DIAGNOSIS — Z90.49 HX OF CHOLECYSTECTOMY: Primary | ICD-10-CM

## 2024-08-13 DIAGNOSIS — F41.9 ANXIETY: ICD-10-CM

## 2024-08-13 DIAGNOSIS — G40.909 SEIZURE DISORDER (MULTI): ICD-10-CM

## 2024-08-13 DIAGNOSIS — N31.9 NEUROGENIC BLADDER: ICD-10-CM

## 2024-08-13 PROCEDURE — 99309 SBSQ NF CARE MODERATE MDM 30: CPT | Performed by: NURSE PRACTITIONER

## 2024-08-13 ASSESSMENT — ENCOUNTER SYMPTOMS
FEVER: 0
COUGH: 0
PALPITATIONS: 0
DIARRHEA: 0
ABDOMINAL PAIN: 0
CHILLS: 0
FATIGUE: 0
NAUSEA: 0
DIFFICULTY URINATING: 0
CONSTIPATION: 0
VOMITING: 0
SHORTNESS OF BREATH: 0

## 2024-08-13 NOTE — LETTER
Patient: Nato Downs  : 1970    Encounter Date: 2024    Subjective  Nato Downs is a 53 y.o. male here for weekly skilled visit.   HPI  Health problems reviewed and no acute concerns.  Participating in therapy and feeling stronger.  Ostomy output has returned to soft semi formed brown per his normal output.   Eating and drinking well.  Denies any complaints of pain.  No concerns per staff.   ERCP was completed.     He is starting to ambulate more in therapy.         Review of Systems   Constitutional:  Negative for chills, fatigue and fever.   Respiratory:  Negative for cough and shortness of breath.    Cardiovascular:  Negative for chest pain and palpitations.   Gastrointestinal:  Negative for abdominal pain, constipation, diarrhea, nausea and vomiting.   Genitourinary:  Negative for difficulty urinating.       Objective  /84   Pulse 82   Temp 36.7 °C (98 °F)   Resp 18   Wt 130 kg (286 lb)   SpO2 97%   BMI 34.81 kg/m²     Physical Exam  Constitutional:       General: He is not in acute distress.     Appearance: He is obese.   HENT:      Head: Normocephalic and atraumatic.   Eyes:      Conjunctiva/sclera: Conjunctivae normal.   Cardiovascular:      Rate and Rhythm: Normal rate and regular rhythm.   Pulmonary:      Effort: Pulmonary effort is normal. No respiratory distress.      Breath sounds: Normal breath sounds.   Abdominal:      General: Bowel sounds are normal. There is no distension.      Palpations: Abdomen is soft.      Tenderness: There is no abdominal tenderness.      Comments: Ostomy draining semi formed brown.   3 lap sites en,  intact.  Large transverse incision en, no erythema, healing.    Genitourinary:     Comments: Suprapubic cath draining clear yellow  Musculoskeletal:         General: Normal range of motion.      Right lower leg: No edema.      Left lower leg: No edema.   Skin:     General: Skin is warm and dry.   Neurological:      General: No focal deficit  present.      Mental Status: He is alert. Mental status is at baseline.   Psychiatric:         Mood and Affect: Mood normal.         Behavior: Behavior normal.         Assessment/Plan  Problem List Items Addressed This Visit       Anxiety     Appears controlled at present.  continue with current medical management           Ileostomy in place (Multi)     Ostomy output has normalized to semi formed brown.          Neurogenic bladder     Suprapubic cath.           Seizure disorder (Multi)     No known recent seizures.    Staff to monitor and notify for any recurrence of seizure activity           Hx of cholecystectomy - Primary     He is recovering well, incision FIDEL, healing, staples and erythema have resolved.  No abdominal pain and eating and drinking well.  Ostomy fucntioning well.   ERCP was completed.         labs/meds/orders reviewed  staff to monitor and notify for any changes.  continue with therapy as able.  Next full labs 9/24 and prn          Electronically Signed By: CHARLA Larios-CNP   8/13/24 12:17 PM

## 2024-08-13 NOTE — PROGRESS NOTES
Subjective   Nato Downs is a 53 y.o. male here for weekly skilled visit.   HPI  Health problems reviewed and no acute concerns.  Participating in therapy and feeling stronger.  Ostomy output has returned to soft semi formed brown per his normal output.   Eating and drinking well.  Denies any complaints of pain.  No concerns per staff.   ERCP was completed.     He is starting to ambulate more in therapy.         Review of Systems   Constitutional:  Negative for chills, fatigue and fever.   Respiratory:  Negative for cough and shortness of breath.    Cardiovascular:  Negative for chest pain and palpitations.   Gastrointestinal:  Negative for abdominal pain, constipation, diarrhea, nausea and vomiting.   Genitourinary:  Negative for difficulty urinating.       Objective   /84   Pulse 82   Temp 36.7 °C (98 °F)   Resp 18   Wt 130 kg (286 lb)   SpO2 97%   BMI 34.81 kg/m²     Physical Exam  Constitutional:       General: He is not in acute distress.     Appearance: He is obese.   HENT:      Head: Normocephalic and atraumatic.   Eyes:      Conjunctiva/sclera: Conjunctivae normal.   Cardiovascular:      Rate and Rhythm: Normal rate and regular rhythm.   Pulmonary:      Effort: Pulmonary effort is normal. No respiratory distress.      Breath sounds: Normal breath sounds.   Abdominal:      General: Bowel sounds are normal. There is no distension.      Palpations: Abdomen is soft.      Tenderness: There is no abdominal tenderness.      Comments: Ostomy draining semi formed brown.   3 lap sites en,  intact.  Large transverse incision en, no erythema, healing.    Genitourinary:     Comments: Suprapubic cath draining clear yellow  Musculoskeletal:         General: Normal range of motion.      Right lower leg: No edema.      Left lower leg: No edema.   Skin:     General: Skin is warm and dry.   Neurological:      General: No focal deficit present.      Mental Status: He is alert. Mental status is at baseline.    Psychiatric:         Mood and Affect: Mood normal.         Behavior: Behavior normal.         Assessment/Plan   Problem List Items Addressed This Visit       Anxiety     Appears controlled at present.  continue with current medical management           Ileostomy in place (Multi)     Ostomy output has normalized to semi formed brown.          Neurogenic bladder     Suprapubic cath.           Seizure disorder (Multi)     No known recent seizures.    Staff to monitor and notify for any recurrence of seizure activity           Hx of cholecystectomy - Primary     He is recovering well, incision FIDEL, healing, staples and erythema have resolved.  No abdominal pain and eating and drinking well.  Ostomy fucntioning well.   ERCP was completed.         labs/meds/orders reviewed  staff to monitor and notify for any changes.  continue with therapy as able.  Next full labs 9/24 and prn

## 2024-08-13 NOTE — ASSESSMENT & PLAN NOTE
He is recovering well, incision FIDEL, healing, staples and erythema have resolved.  No abdominal pain and eating and drinking well.  Ostomy fucntioning well.   ERCP was completed.

## 2024-08-16 ENCOUNTER — NURSING HOME VISIT (OUTPATIENT)
Dept: POST ACUTE CARE | Facility: EXTERNAL LOCATION | Age: 54
End: 2024-08-16
Payer: MEDICARE

## 2024-08-16 VITALS
DIASTOLIC BLOOD PRESSURE: 76 MMHG | RESPIRATION RATE: 20 BRPM | SYSTOLIC BLOOD PRESSURE: 124 MMHG | OXYGEN SATURATION: 98 % | HEART RATE: 68 BPM | BODY MASS INDEX: 34.81 KG/M2 | WEIGHT: 286 LBS | TEMPERATURE: 97.7 F

## 2024-08-16 DIAGNOSIS — F79 ACQUIRED INTELLECTUAL DISABILITY: ICD-10-CM

## 2024-08-16 DIAGNOSIS — G40.909 SEIZURE DISORDER (MULTI): Primary | ICD-10-CM

## 2024-08-16 DIAGNOSIS — E66.9 OBESITY, UNSPECIFIED CLASSIFICATION, UNSPECIFIED OBESITY TYPE, UNSPECIFIED WHETHER SERIOUS COMORBIDITY PRESENT: ICD-10-CM

## 2024-08-16 DIAGNOSIS — R53.81 PHYSICAL DEBILITY: ICD-10-CM

## 2024-08-16 DIAGNOSIS — F03.B0 MODERATE DEMENTIA WITHOUT BEHAVIORAL DISTURBANCE, PSYCHOTIC DISTURBANCE, MOOD DISTURBANCE, OR ANXIETY, UNSPECIFIED DEMENTIA TYPE (MULTI): ICD-10-CM

## 2024-08-16 PROCEDURE — 99308 SBSQ NF CARE LOW MDM 20: CPT | Performed by: INTERNAL MEDICINE

## 2024-08-16 NOTE — PROGRESS NOTES
Subjective   Patient ID: Nato Downs is a 54 y.o. male who is long term resident being seen and evaluated for multiple medical problems.    HPI   53-year-old male patient is resting comfortably in the common area watching television.  He has no complaints for me today.  Nursing has no new adverse events toward me.  He appears as usual in good spirits.    Current high risk medication:  Clonazepam  Diazepam  Ditropan  Keppra  Risperidone  Trileptal    Laboratory examination from July 25, 2024:  Potassium 4.1  Bicarbonate 23  Creatinine 1.0  White blood cell 10.7  Hemoglobin 10.7  Laboratory emanations from July 17, 2024:  Albumin 3.6  Liver injury test normal    Review of Systems   Constitutional:  Negative for chills, fatigue and fever.   Respiratory:  Negative for cough and shortness of breath.    Cardiovascular:  Negative for chest pain and palpitations.   Gastrointestinal:  Negative for abdominal pain, constipation, diarrhea, nausea and vomiting.   Genitourinary:  Negative for difficulty urinating.       Objective   /76   Pulse 68   Temp 36.5 °C (97.7 °F)   Resp 20   Wt 130 kg (286 lb)   SpO2 98%   BMI 34.81 kg/m²     Physical Exam  Constitutional:       General: He is not in acute distress.     Appearance: He is obese.   HENT:      Head: Normocephalic and atraumatic.   Eyes:      Conjunctiva/sclera: Conjunctivae normal.   Cardiovascular:      Rate and Rhythm: Normal rate and regular rhythm.   Pulmonary:      Effort: Pulmonary effort is normal. No respiratory distress.      Breath sounds: Normal breath sounds.   Abdominal:      General: Bowel sounds are normal. There is no distension.      Palpations: Abdomen is soft.      Tenderness: There is no abdominal tenderness.      Comments: Ostomy draining semi formed brown.   3 lap sites en,  intact.  Large transverse incision en, no erythema, healing.    Genitourinary:     Comments: Suprapubic cath draining clear yellow  Musculoskeletal:          General: Normal range of motion.      Right lower leg: No edema.      Left lower leg: No edema.   Skin:     General: Skin is warm and dry.   Neurological:      General: No focal deficit present.      Mental Status: He is alert. Mental status is at baseline.   Psychiatric:         Mood and Affect: Mood normal.         Behavior: Behavior normal.         Assessment/Plan   Problem List Items Addressed This Visit             ICD-10-CM    Acquired intellectual disability F79    Obesity E66.9    Physical debility R53.81    Seizure disorder (Multi) - Primary G40.909    Moderate dementia without behavioral disturbance, psychotic disturbance, mood disturbance, or anxiety, unspecified dementia type (Multi) F03.B0       8.  Will continue with restorative and supportive care as the patient tolerates    B.  Laboratory semination is next be due in January 2025 or as needed    C.  The patient's prognosis is guarded.

## 2024-08-16 NOTE — LETTER
Patient: Nato Downs  : 1970    Encounter Date: 2024    Subjective  Patient ID: Nato Downs is a 54 y.o. male who is long term resident being seen and evaluated for multiple medical problems.    HPI   53-year-old male patient is resting comfortably in the common area watching television.  He has no complaints for me today.  Nursing has no new adverse events toward me.  He appears as usual in good spirits.    Current high risk medication:  Clonazepam  Diazepam  Ditropan  Keppra  Risperidone  Trileptal    Laboratory examination from 2024:  Potassium 4.1  Bicarbonate 23  Creatinine 1.0  White blood cell 10.7  Hemoglobin 10.7  Laboratory emanations from 2024:  Albumin 3.6  Liver injury test normal    Review of Systems   Constitutional:  Negative for chills, fatigue and fever.   Respiratory:  Negative for cough and shortness of breath.    Cardiovascular:  Negative for chest pain and palpitations.   Gastrointestinal:  Negative for abdominal pain, constipation, diarrhea, nausea and vomiting.   Genitourinary:  Negative for difficulty urinating.       Objective  /76   Pulse 68   Temp 36.5 °C (97.7 °F)   Resp 20   Wt 130 kg (286 lb)   SpO2 98%   BMI 34.81 kg/m²     Physical Exam  Constitutional:       General: He is not in acute distress.     Appearance: He is obese.   HENT:      Head: Normocephalic and atraumatic.   Eyes:      Conjunctiva/sclera: Conjunctivae normal.   Cardiovascular:      Rate and Rhythm: Normal rate and regular rhythm.   Pulmonary:      Effort: Pulmonary effort is normal. No respiratory distress.      Breath sounds: Normal breath sounds.   Abdominal:      General: Bowel sounds are normal. There is no distension.      Palpations: Abdomen is soft.      Tenderness: There is no abdominal tenderness.      Comments: Ostomy draining semi formed brown.   3 lap sites en,  intact.  Large transverse incision en, no erythema, healing.    Genitourinary:     Comments:  Suprapubic cath draining clear yellow  Musculoskeletal:         General: Normal range of motion.      Right lower leg: No edema.      Left lower leg: No edema.   Skin:     General: Skin is warm and dry.   Neurological:      General: No focal deficit present.      Mental Status: He is alert. Mental status is at baseline.   Psychiatric:         Mood and Affect: Mood normal.         Behavior: Behavior normal.         Assessment/Plan  Problem List Items Addressed This Visit             ICD-10-CM    Acquired intellectual disability F79    Obesity E66.9    Physical debility R53.81    Seizure disorder (Multi) - Primary G40.909    Moderate dementia without behavioral disturbance, psychotic disturbance, mood disturbance, or anxiety, unspecified dementia type (Multi) F03.B0       8.  Will continue with restorative and supportive care as the patient tolerates    B.  Laboratory semination is next be due in January 2025 or as needed    C.  The patient's prognosis is guarded.      Electronically Signed By: Amilcar Quezada MD   8/26/24  3:56 PM

## 2024-08-20 ENCOUNTER — NURSING HOME VISIT (OUTPATIENT)
Dept: POST ACUTE CARE | Facility: EXTERNAL LOCATION | Age: 54
End: 2024-08-20
Payer: MEDICARE

## 2024-08-20 VITALS
OXYGEN SATURATION: 96 % | RESPIRATION RATE: 18 BRPM | TEMPERATURE: 98.1 F | WEIGHT: 286 LBS | SYSTOLIC BLOOD PRESSURE: 130 MMHG | DIASTOLIC BLOOD PRESSURE: 76 MMHG | BODY MASS INDEX: 34.81 KG/M2 | HEART RATE: 76 BPM

## 2024-08-20 DIAGNOSIS — Z93.2 ILEOSTOMY IN PLACE (MULTI): ICD-10-CM

## 2024-08-20 DIAGNOSIS — G40.909 SEIZURE DISORDER (MULTI): ICD-10-CM

## 2024-08-20 DIAGNOSIS — Z93.59 SUPRAPUBIC CATHETER (MULTI): ICD-10-CM

## 2024-08-20 DIAGNOSIS — Z90.49 HX OF CHOLECYSTECTOMY: Primary | ICD-10-CM

## 2024-08-20 DIAGNOSIS — F41.9 ANXIETY: ICD-10-CM

## 2024-08-20 PROCEDURE — 99309 SBSQ NF CARE MODERATE MDM 30: CPT | Performed by: NURSE PRACTITIONER

## 2024-08-20 ASSESSMENT — ENCOUNTER SYMPTOMS
CONSTIPATION: 0
SHORTNESS OF BREATH: 0
VOMITING: 0
FEVER: 0
CHILLS: 0
DIFFICULTY URINATING: 0
DIARRHEA: 0
NAUSEA: 0
COUGH: 0
FATIGUE: 0
PALPITATIONS: 0
ABDOMINAL PAIN: 0

## 2024-08-20 NOTE — LETTER
Patient: Nato Downs  : 1970    Encounter Date: 2024      Subjective  Nato Downs is a 54 y.o. male here for weekly skilled visit.   HPI  Health problems reviewed and no acute concerns.  Participating in therapy and feeling stronger.    Eating and drinking well.  Denies any complaints of pain.  No concerns per staff.   He is starting to ambulate more in therapy.         Review of Systems   Constitutional:  Negative for chills, fatigue and fever.   Respiratory:  Negative for cough and shortness of breath.    Cardiovascular:  Negative for chest pain and palpitations.   Gastrointestinal:  Negative for abdominal pain, constipation, diarrhea, nausea and vomiting.   Genitourinary:  Negative for difficulty urinating.       Objective  /76   Pulse 76   Temp 36.7 °C (98.1 °F)   Resp 18   Wt 130 kg (286 lb)   SpO2 96%   BMI 34.81 kg/m²     Physical Exam  Constitutional:       General: He is not in acute distress.     Appearance: He is obese.   HENT:      Head: Normocephalic and atraumatic.   Eyes:      Conjunctiva/sclera: Conjunctivae normal.   Cardiovascular:      Rate and Rhythm: Normal rate and regular rhythm.   Pulmonary:      Effort: Pulmonary effort is normal. No respiratory distress.      Breath sounds: Normal breath sounds.   Abdominal:      General: Bowel sounds are normal. There is no distension.      Palpations: Abdomen is soft.      Tenderness: There is no abdominal tenderness.      Comments: Ostomy draining semi formed brown.   3 lap sites en,  intact.  Large transverse incision en, no erythema, healing.    Genitourinary:     Comments: Suprapubic cath draining clear yellow  Musculoskeletal:         General: Normal range of motion.      Right lower leg: No edema.      Left lower leg: No edema.   Skin:     General: Skin is warm and dry.   Neurological:      General: No focal deficit present.      Mental Status: He is alert. Mental status is at baseline.   Psychiatric:         Mood  and Affect: Mood normal.         Behavior: Behavior normal.         Assessment/Plan  Problem List Items Addressed This Visit       Anxiety     Appears controlled at present.  continue with current medical management           Ileostomy in place (Multi)     Semi formed brown.           Seizure disorder (Multi)     No known recent seizures.    Staff to monitor and notify for any recurrence of seizure activity           Suprapubic catheter (Multi)     Functioning well.     staff to monitor and notify for any changes.           Hx of cholecystectomy - Primary     He is recovering well, incision FIDEL, healing.  No abdominal pain and eating and drinking well.  Ostomy fucntioning well.   ERCP was completed.         labs/meds/orders reviewed  staff to monitor and notify for any changes.  continue with therapy as able.  Next full labs 9/24 and prn          Electronically Signed By: CHARLA Larios-CNP   8/20/24 11:24 AM

## 2024-08-20 NOTE — ASSESSMENT & PLAN NOTE
He is recovering well, incision FIDEL, healing.  No abdominal pain and eating and drinking well.  Ostomy fucntioning well.   ERCP was completed.

## 2024-08-20 NOTE — PROGRESS NOTES
Subjective   Nato Downs is a 54 y.o. male here for weekly skilled visit.   HPI  Health problems reviewed and no acute concerns.  Participating in therapy and feeling stronger.    Eating and drinking well.  Denies any complaints of pain.  No concerns per staff.   He is starting to ambulate more in therapy.         Review of Systems   Constitutional:  Negative for chills, fatigue and fever.   Respiratory:  Negative for cough and shortness of breath.    Cardiovascular:  Negative for chest pain and palpitations.   Gastrointestinal:  Negative for abdominal pain, constipation, diarrhea, nausea and vomiting.   Genitourinary:  Negative for difficulty urinating.       Objective   /76   Pulse 76   Temp 36.7 °C (98.1 °F)   Resp 18   Wt 130 kg (286 lb)   SpO2 96%   BMI 34.81 kg/m²     Physical Exam  Constitutional:       General: He is not in acute distress.     Appearance: He is obese.   HENT:      Head: Normocephalic and atraumatic.   Eyes:      Conjunctiva/sclera: Conjunctivae normal.   Cardiovascular:      Rate and Rhythm: Normal rate and regular rhythm.   Pulmonary:      Effort: Pulmonary effort is normal. No respiratory distress.      Breath sounds: Normal breath sounds.   Abdominal:      General: Bowel sounds are normal. There is no distension.      Palpations: Abdomen is soft.      Tenderness: There is no abdominal tenderness.      Comments: Ostomy draining semi formed brown.   3 lap sites en,  intact.  Large transverse incision en, no erythema, healing.    Genitourinary:     Comments: Suprapubic cath draining clear yellow  Musculoskeletal:         General: Normal range of motion.      Right lower leg: No edema.      Left lower leg: No edema.   Skin:     General: Skin is warm and dry.   Neurological:      General: No focal deficit present.      Mental Status: He is alert. Mental status is at baseline.   Psychiatric:         Mood and Affect: Mood normal.         Behavior: Behavior normal.          Assessment/Plan   Problem List Items Addressed This Visit       Anxiety     Appears controlled at present.  continue with current medical management           Ileostomy in place (Multi)     Semi formed brown.           Seizure disorder (Multi)     No known recent seizures.    Staff to monitor and notify for any recurrence of seizure activity           Suprapubic catheter (Multi)     Functioning well.     staff to monitor and notify for any changes.           Hx of cholecystectomy - Primary     He is recovering well, incision FIDEL, healing.  No abdominal pain and eating and drinking well.  Ostomy fucntioning well.   ERCP was completed.         labs/meds/orders reviewed  staff to monitor and notify for any changes.  continue with therapy as able.  Next full labs 9/24 and prn

## 2024-08-26 ASSESSMENT — ENCOUNTER SYMPTOMS
VOMITING: 0
COUGH: 0
DIARRHEA: 0
NAUSEA: 0
PALPITATIONS: 0
ABDOMINAL PAIN: 0
SHORTNESS OF BREATH: 0
FATIGUE: 0
FEVER: 0
CHILLS: 0
CONSTIPATION: 0
DIFFICULTY URINATING: 0

## 2024-09-18 DIAGNOSIS — F41.9 ANXIETY: ICD-10-CM

## 2024-09-18 RX ORDER — CLONAZEPAM 0.5 MG/1
0.5 TABLET ORAL 2 TIMES DAILY
Qty: 60 TABLET | Refills: 5 | Status: SHIPPED | OUTPATIENT
Start: 2024-09-18

## 2024-10-15 ENCOUNTER — NURSING HOME VISIT (OUTPATIENT)
Dept: POST ACUTE CARE | Facility: EXTERNAL LOCATION | Age: 54
End: 2024-10-15
Payer: MEDICARE

## 2024-10-15 VITALS
OXYGEN SATURATION: 96 % | RESPIRATION RATE: 18 BRPM | SYSTOLIC BLOOD PRESSURE: 128 MMHG | DIASTOLIC BLOOD PRESSURE: 74 MMHG | HEART RATE: 72 BPM | BODY MASS INDEX: 34.93 KG/M2 | WEIGHT: 287 LBS | TEMPERATURE: 97.9 F

## 2024-10-15 DIAGNOSIS — G40.909 SEIZURE DISORDER (MULTI): ICD-10-CM

## 2024-10-15 DIAGNOSIS — N31.9 NEUROGENIC BLADDER: ICD-10-CM

## 2024-10-15 DIAGNOSIS — Z90.49 HX OF CHOLECYSTECTOMY: Primary | ICD-10-CM

## 2024-10-15 DIAGNOSIS — F03.B0 MODERATE DEMENTIA WITHOUT BEHAVIORAL DISTURBANCE, PSYCHOTIC DISTURBANCE, MOOD DISTURBANCE, OR ANXIETY, UNSPECIFIED DEMENTIA TYPE: ICD-10-CM

## 2024-10-15 DIAGNOSIS — Z93.2 ILEOSTOMY IN PLACE (MULTI): ICD-10-CM

## 2024-10-15 PROCEDURE — 99309 SBSQ NF CARE MODERATE MDM 30: CPT | Performed by: NURSE PRACTITIONER

## 2024-10-15 ASSESSMENT — ENCOUNTER SYMPTOMS
ABDOMINAL PAIN: 0
VOMITING: 0
CONSTIPATION: 0
FATIGUE: 0
CHILLS: 0
PALPITATIONS: 0
FEVER: 0
COUGH: 0
SHORTNESS OF BREATH: 0
DIFFICULTY URINATING: 0
NAUSEA: 0
DIARRHEA: 0

## 2024-10-15 NOTE — LETTER
Patient: Nato Downs  : 1970    Encounter Date: 10/15/2024      Subjective  Nato Downs is a 54 y.o. male here for routine visit.   HPI   There are no new problems and multiple health problems have been reviewed.  The course has been unchanged.  The patient  is moderately confused.   There are no family members present during time of visit.    Grzegorz dougherty is sitting up in chair , denies any complaints when asked.   No known recent seizures.  Eating and drinking well.   No concerns per staff.          Review of Systems   Constitutional:  Negative for chills, fatigue and fever.   Respiratory:  Negative for cough and shortness of breath.    Cardiovascular:  Negative for chest pain and palpitations.   Gastrointestinal:  Negative for abdominal pain, constipation, diarrhea, nausea and vomiting.   Genitourinary:  Negative for difficulty urinating.       Objective  /74   Pulse 72   Temp 36.6 °C (97.9 °F)   Resp 18   Wt 130 kg (287 lb)   SpO2 96%   BMI 34.93 kg/m²     Physical Exam  Constitutional:       General: He is not in acute distress.     Appearance: He is obese.   HENT:      Head: Normocephalic and atraumatic.   Eyes:      Conjunctiva/sclera: Conjunctivae normal.   Cardiovascular:      Rate and Rhythm: Normal rate and regular rhythm.   Pulmonary:      Effort: Pulmonary effort is normal. No respiratory distress.      Breath sounds: Normal breath sounds.   Abdominal:      General: Bowel sounds are normal. There is no distension.      Palpations: Abdomen is soft.      Tenderness: There is no abdominal tenderness.      Comments: Ostomy draining semi formed brown.   3 lap sites en,  intact.  Large transverse incision en, no erythema, healed   Genitourinary:     Comments: Suprapubic cath draining clear yellow  Musculoskeletal:         General: Normal range of motion.      Right lower leg: No edema.      Left lower leg: No edema.   Skin:     General: Skin is warm and dry.   Neurological:       General: No focal deficit present.      Mental Status: He is alert. Mental status is at baseline.   Psychiatric:         Mood and Affect: Mood normal.         Behavior: Behavior normal.         Assessment/Plan  Problem List Items Addressed This Visit       Ileostomy in place (Multi)     Semi formed brown.           Neurogenic bladder     Suprapubic cath.           Seizure disorder (Multi)     No known recent seizures.    Staff to monitor and notify for any recurrence of seizure activity           Moderate dementia without behavioral disturbance, psychotic disturbance, mood disturbance, or anxiety, unspecified dementia type     Mild to moderate.          Hx of cholecystectomy - Primary     He is recovering well, incision FIDEL, healing.  No abdominal pain and eating and drinking well.  Ostomy fucntioning well.   ERCP was completed.         labs/meds/orders reviewed  staff to monitor and notify for any changes.  continue with therapy as able.  Next full labs 1/25 and prn          Electronically Signed By: NASREEN Larios   10/15/24 12:22 PM

## 2024-10-15 NOTE — PROGRESS NOTES
Subjective   Nato Downs is a 54 y.o. male here for routine visit.   HPI   There are no new problems and multiple health problems have been reviewed.  The course has been unchanged.  The patient  is moderately confused.   There are no family members present during time of visit.    Grzegorz dougherty is sitting up in chair , denies any complaints when asked.   No known recent seizures.  Eating and drinking well.   No concerns per staff.          Review of Systems   Constitutional:  Negative for chills, fatigue and fever.   Respiratory:  Negative for cough and shortness of breath.    Cardiovascular:  Negative for chest pain and palpitations.   Gastrointestinal:  Negative for abdominal pain, constipation, diarrhea, nausea and vomiting.   Genitourinary:  Negative for difficulty urinating.       Objective   /74   Pulse 72   Temp 36.6 °C (97.9 °F)   Resp 18   Wt 130 kg (287 lb)   SpO2 96%   BMI 34.93 kg/m²     Physical Exam  Constitutional:       General: He is not in acute distress.     Appearance: He is obese.   HENT:      Head: Normocephalic and atraumatic.   Eyes:      Conjunctiva/sclera: Conjunctivae normal.   Cardiovascular:      Rate and Rhythm: Normal rate and regular rhythm.   Pulmonary:      Effort: Pulmonary effort is normal. No respiratory distress.      Breath sounds: Normal breath sounds.   Abdominal:      General: Bowel sounds are normal. There is no distension.      Palpations: Abdomen is soft.      Tenderness: There is no abdominal tenderness.      Comments: Ostomy draining semi formed brown.   3 lap sites en,  intact.  Large transverse incision en, no erythema, healed   Genitourinary:     Comments: Suprapubic cath draining clear yellow  Musculoskeletal:         General: Normal range of motion.      Right lower leg: No edema.      Left lower leg: No edema.   Skin:     General: Skin is warm and dry.   Neurological:      General: No focal deficit present.      Mental Status: He is alert. Mental  status is at baseline.   Psychiatric:         Mood and Affect: Mood normal.         Behavior: Behavior normal.         Assessment/Plan   Problem List Items Addressed This Visit       Ileostomy in place (Multi)     Semi formed brown.           Neurogenic bladder     Suprapubic cath.           Seizure disorder (Multi)     No known recent seizures.    Staff to monitor and notify for any recurrence of seizure activity           Moderate dementia without behavioral disturbance, psychotic disturbance, mood disturbance, or anxiety, unspecified dementia type     Mild to moderate.          Hx of cholecystectomy - Primary     He is recovering well, incision FIDEL, healing.  No abdominal pain and eating and drinking well.  Ostomy fucntioning well.   ERCP was completed.         labs/meds/orders reviewed  staff to monitor and notify for any changes.  continue with therapy as able.  Next full labs 1/25 and prn

## 2024-10-17 ENCOUNTER — NURSING HOME VISIT (OUTPATIENT)
Dept: POST ACUTE CARE | Facility: EXTERNAL LOCATION | Age: 54
End: 2024-10-17
Payer: MEDICARE

## 2024-10-17 DIAGNOSIS — G40.909 SEIZURE DISORDER (MULTI): Primary | ICD-10-CM

## 2024-10-17 DIAGNOSIS — Q07.9 CONGENITAL ENCEPHALOPATHY (MULTI): ICD-10-CM

## 2024-10-17 DIAGNOSIS — F03.B0 MODERATE DEMENTIA WITHOUT BEHAVIORAL DISTURBANCE, PSYCHOTIC DISTURBANCE, MOOD DISTURBANCE, OR ANXIETY, UNSPECIFIED DEMENTIA TYPE: ICD-10-CM

## 2024-10-17 DIAGNOSIS — R53.81 PHYSICAL DEBILITY: ICD-10-CM

## 2024-10-17 PROCEDURE — 99308 SBSQ NF CARE LOW MDM 20: CPT | Performed by: INTERNAL MEDICINE

## 2024-10-21 VITALS
SYSTOLIC BLOOD PRESSURE: 128 MMHG | RESPIRATION RATE: 18 BRPM | WEIGHT: 287 LBS | BODY MASS INDEX: 34.93 KG/M2 | HEART RATE: 72 BPM | DIASTOLIC BLOOD PRESSURE: 74 MMHG

## 2024-10-28 ASSESSMENT — ENCOUNTER SYMPTOMS
FEVER: 0
CONSTIPATION: 0
ABDOMINAL PAIN: 0
FATIGUE: 0
CHILLS: 0
COUGH: 0
DIARRHEA: 0
PALPITATIONS: 0
NAUSEA: 0
SHORTNESS OF BREATH: 0
DIFFICULTY URINATING: 0
VOMITING: 0

## 2024-11-07 DIAGNOSIS — G40.909 SEIZURE DISORDER (MULTI): ICD-10-CM

## 2024-11-07 RX ORDER — DIAZEPAM 10 MG/2G
10 GEL RECTAL AS NEEDED
Qty: 1 EACH | Refills: 5 | Status: SHIPPED | OUTPATIENT
Start: 2024-11-07

## 2024-11-15 ENCOUNTER — NURSING HOME VISIT (OUTPATIENT)
Dept: POST ACUTE CARE | Facility: EXTERNAL LOCATION | Age: 54
End: 2024-11-15
Payer: MEDICARE

## 2024-11-15 VITALS
BODY MASS INDEX: 35.18 KG/M2 | HEART RATE: 68 BPM | OXYGEN SATURATION: 98 % | SYSTOLIC BLOOD PRESSURE: 122 MMHG | DIASTOLIC BLOOD PRESSURE: 74 MMHG | RESPIRATION RATE: 18 BRPM | WEIGHT: 289 LBS

## 2024-11-15 DIAGNOSIS — R63.5 ABNORMAL WEIGHT GAIN: Primary | ICD-10-CM

## 2024-11-15 DIAGNOSIS — Q07.9 CONGENITAL ENCEPHALOPATHY (MULTI): ICD-10-CM

## 2024-11-15 DIAGNOSIS — E88.810 INSULIN RESISTANCE SYNDROME: ICD-10-CM

## 2024-11-15 DIAGNOSIS — F03.B0 MODERATE DEMENTIA WITHOUT BEHAVIORAL DISTURBANCE, PSYCHOTIC DISTURBANCE, MOOD DISTURBANCE, OR ANXIETY, UNSPECIFIED DEMENTIA TYPE: ICD-10-CM

## 2024-11-15 DIAGNOSIS — F29 UNSP PSYCHOSIS NOT DUE TO A SUBSTANCE OR KNOWN PHYSIOL COND (MULTI): ICD-10-CM

## 2024-11-15 PROCEDURE — 99309 SBSQ NF CARE MODERATE MDM 30: CPT | Performed by: INTERNAL MEDICINE

## 2024-11-15 NOTE — PROGRESS NOTES
Subjective   Patient ID: Nato Downs is a 54 y.o. male who is long term resident being seen and evaluated for multiple medical problems.    HPI   54-year-old male patient is resting comfortably in his bed in no distress.  He is in good spirits.  His family is concerned about his weight gain.  Nursing has no other adverse events reported to me.    Current high risk medication:  Clonazepam  Diastat  Ditropan  Keppra  Risperidone  Trileptal    Laboratory examination from July 2024:  Potassium 4.1  Bicarbonate 23  Creatinine 1.0  Hemoglobin 10.7  Albumin 3.6  Liver injury test normal    Review of Systems   Constitutional:  Negative for chills, fatigue and fever.   Respiratory:  Negative for cough and shortness of breath.    Cardiovascular:  Negative for chest pain and palpitations.   Gastrointestinal:  Negative for abdominal pain, constipation, diarrhea, nausea and vomiting.   Genitourinary:  Negative for difficulty urinating.       Objective   /74   Pulse 68   Resp 18   Wt 131 kg (289 lb)   SpO2 98%   BMI 35.18 kg/m²     Physical Exam  Constitutional:       General: He is not in acute distress.     Appearance: He is obese.   HENT:      Head: Normocephalic and atraumatic.   Eyes:      Conjunctiva/sclera: Conjunctivae normal.   Cardiovascular:      Rate and Rhythm: Normal rate and regular rhythm.   Pulmonary:      Effort: Pulmonary effort is normal. No respiratory distress.      Breath sounds: Normal breath sounds.   Abdominal:      General: Bowel sounds are normal. There is no distension.      Palpations: Abdomen is soft.      Tenderness: There is no abdominal tenderness.      Comments: Ostomy draining semi formed brown.      Genitourinary:     Comments: Suprapubic cath draining clear yellow  Musculoskeletal:         General: Normal range of motion.      Right lower leg: No edema.      Left lower leg: No edema.   Skin:     General: Skin is warm and dry.   Neurological:      General: No focal deficit  present.      Mental Status: He is alert. Mental status is at baseline.   Psychiatric:         Mood and Affect: Mood normal.         Behavior: Behavior normal.         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  We will continue with restorative and supportive care as the patient tolerates    B.  Laboratory examinations will next be due in January 2025 or as needed    C.  Will decrease the patient's risperidone which is the most likely causative agent of his abnormal weight gain.  If his weight continues to increase on lower dose risperidone or if his behaviors become unacceptable on lower dose risperidone then we will ask pharmacy to consider the application of GLP-1 agonist therapy to offset the polyphagia side effects of risperidone.    D.  Psychiatry should be made aware of the patient's accelerating weight gain.    E.  The patient's prognosis is guarded.

## 2024-11-15 NOTE — LETTER
Patient: Nato Downs  : 1970    Encounter Date: 11/15/2024    Subjective  Patient ID: Nato Downs is a 54 y.o. male who is long term resident being seen and evaluated for multiple medical problems.    HPI   54-year-old male patient is resting comfortably in his bed in no distress.  He is in good spirits.  His family is concerned about his weight gain.  Nursing has no other adverse events reported to me.    Current high risk medication:  Clonazepam  Diastat  Ditropan  Keppra  Risperidone  Trileptal    Laboratory examination from 2024:  Potassium 4.1  Bicarbonate 23  Creatinine 1.0  Hemoglobin 10.7  Albumin 3.6  Liver injury test normal    Review of Systems   Constitutional:  Negative for chills, fatigue and fever.   Respiratory:  Negative for cough and shortness of breath.    Cardiovascular:  Negative for chest pain and palpitations.   Gastrointestinal:  Negative for abdominal pain, constipation, diarrhea, nausea and vomiting.   Genitourinary:  Negative for difficulty urinating.       Objective  /74   Pulse 68   Resp 18   Wt 131 kg (289 lb)   SpO2 98%   BMI 35.18 kg/m²     Physical Exam  Constitutional:       General: He is not in acute distress.     Appearance: He is obese.   HENT:      Head: Normocephalic and atraumatic.   Eyes:      Conjunctiva/sclera: Conjunctivae normal.   Cardiovascular:      Rate and Rhythm: Normal rate and regular rhythm.   Pulmonary:      Effort: Pulmonary effort is normal. No respiratory distress.      Breath sounds: Normal breath sounds.   Abdominal:      General: Bowel sounds are normal. There is no distension.      Palpations: Abdomen is soft.      Tenderness: There is no abdominal tenderness.      Comments: Ostomy draining semi formed brown.      Genitourinary:     Comments: Suprapubic cath draining clear yellow  Musculoskeletal:         General: Normal range of motion.      Right lower leg: No edema.      Left lower leg: No edema.   Skin:     General: Skin  is warm and dry.   Neurological:      General: No focal deficit present.      Mental Status: He is alert. Mental status is at baseline.   Psychiatric:         Mood and Affect: Mood normal.         Behavior: Behavior normal.         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  We will continue with restorative and supportive care as the patient tolerates    B.  Laboratory examinations will next be due in January 2025 or as needed    C.  Will decrease the patient's risperidone which is the most likely causative agent of his abnormal weight gain.  If his weight continues to increase on lower dose risperidone or if his behaviors become unacceptable on lower dose risperidone then we will ask pharmacy to consider the application of GLP-1 agonist therapy to offset the polyphagia side effects of risperidone.    D.  Psychiatry should be made aware of the patient's accelerating weight gain.    E.  The patient's prognosis is guarded.        Electronically Signed By: Amilcar Quezada MD   11/25/24  4:05 PM

## 2024-11-25 PROBLEM — R63.5 ABNORMAL WEIGHT GAIN: Status: ACTIVE | Noted: 2024-11-25

## 2024-11-25 ASSESSMENT — ENCOUNTER SYMPTOMS
ABDOMINAL PAIN: 0
CHILLS: 0
NAUSEA: 0
CONSTIPATION: 0
DIFFICULTY URINATING: 0
VOMITING: 0
FATIGUE: 0
SHORTNESS OF BREATH: 0
COUGH: 0
FEVER: 0
PALPITATIONS: 0
DIARRHEA: 0

## 2024-12-03 ENCOUNTER — NURSING HOME VISIT (OUTPATIENT)
Dept: POST ACUTE CARE | Facility: EXTERNAL LOCATION | Age: 54
End: 2024-12-03
Payer: MEDICARE

## 2024-12-03 VITALS
WEIGHT: 289 LBS | HEART RATE: 68 BPM | DIASTOLIC BLOOD PRESSURE: 80 MMHG | BODY MASS INDEX: 35.18 KG/M2 | RESPIRATION RATE: 18 BRPM | OXYGEN SATURATION: 96 % | SYSTOLIC BLOOD PRESSURE: 134 MMHG | TEMPERATURE: 98 F

## 2024-12-03 DIAGNOSIS — F03.B0 MODERATE DEMENTIA WITHOUT BEHAVIORAL DISTURBANCE, PSYCHOTIC DISTURBANCE, MOOD DISTURBANCE, OR ANXIETY, UNSPECIFIED DEMENTIA TYPE: Primary | ICD-10-CM

## 2024-12-03 DIAGNOSIS — G40.909 SEIZURE DISORDER (MULTI): ICD-10-CM

## 2024-12-03 DIAGNOSIS — N31.9 NEUROGENIC BLADDER: ICD-10-CM

## 2024-12-03 DIAGNOSIS — E88.810 INSULIN RESISTANCE SYNDROME: ICD-10-CM

## 2024-12-03 DIAGNOSIS — K59.2 NEUROGENIC BOWEL: ICD-10-CM

## 2024-12-03 DIAGNOSIS — E55.9 VITAMIN D DEFICIENCY: ICD-10-CM

## 2024-12-03 NOTE — PROGRESS NOTES
Subjective   Nato Downs is a 54 y.o. male here for routine visit.   HPI   There are no new problems and multiple health problems have been reviewed.  The course has been unchanged.  The patient  is moderately confused.   There are no family members present during time of visit.    Grzegorz is is resting in bed , denies any complaints when asked.   No known recent seizures.  Eating and drinking well.   No concerns per staff.  Awaiting insurance determination on cost of semalgutide for obesity          Review of Systems   Constitutional:  Negative for chills, fatigue and fever.   Respiratory:  Negative for cough and shortness of breath.    Cardiovascular:  Negative for chest pain and palpitations.   Gastrointestinal:  Negative for abdominal pain, constipation, diarrhea, nausea and vomiting.   Genitourinary:  Negative for difficulty urinating.       Objective   /80   Pulse 68   Temp 36.7 °C (98 °F)   Resp 18   Wt 131 kg (289 lb)   SpO2 96%   BMI 35.18 kg/m²     Physical Exam  Constitutional:       General: He is not in acute distress.     Appearance: He is obese.   HENT:      Head: Normocephalic and atraumatic.   Eyes:      Conjunctiva/sclera: Conjunctivae normal.   Cardiovascular:      Rate and Rhythm: Normal rate and regular rhythm.   Pulmonary:      Effort: Pulmonary effort is normal. No respiratory distress.      Breath sounds: Normal breath sounds.   Abdominal:      General: Bowel sounds are normal. There is no distension.      Palpations: Abdomen is soft.      Tenderness: There is no abdominal tenderness.      Comments: Ostomy draining semi formed brown.   Abdominal incisions healed.    Genitourinary:     Comments: Suprapubic cath draining clear yellow  Musculoskeletal:         General: Normal range of motion.      Right lower leg: No edema.      Left lower leg: No edema.   Skin:     General: Skin is warm and dry.   Neurological:      General: No focal deficit present.      Mental Status: He is  alert. Mental status is at baseline.   Psychiatric:         Mood and Affect: Mood normal.         Behavior: Behavior normal.         Assessment/Plan   Problem List Items Addressed This Visit       Insulin resistance syndrome     Awaiting insurance determination regarding semaglutide coverage for obesity.    Risperdal has also been decreased.          Neurogenic bladder     Suprapubic cath.  Functioning well         Neurogenic bowel     Ostomy in place.  Functioning well per staff.          Seizure disorder (Multi)     No known recent seizures.    Staff to monitor and notify for any recurrence of seizure activity           Vitamin D deficiency     monitor with routine labs.           Moderate dementia without behavioral disturbance, psychotic disturbance, mood disturbance, or anxiety, unspecified dementia type - Primary     Mild to moderate.         labs/meds/orders reviewed  staff to monitor and notify for any changes.  continue with therapy as able.  Next full labs 1/25 and prn  Awaiting insurance determination of semaglutide coverage

## 2024-12-03 NOTE — LETTER
Patient: Nato Downs  : 1970    Encounter Date: 2024      Subjective  Nato Downs is a 54 y.o. male here for routine visit.   HPI   There are no new problems and multiple health problems have been reviewed.  The course has been unchanged.  The patient  is moderately confused.   There are no family members present during time of visit.    Grzegorz dougherty is resting in bed , denies any complaints when asked.   No known recent seizures.  Eating and drinking well.   No concerns per staff.  Awaiting insurance determination on cost of semalgutide for obesity          Review of Systems   Constitutional:  Negative for chills, fatigue and fever.   Respiratory:  Negative for cough and shortness of breath.    Cardiovascular:  Negative for chest pain and palpitations.   Gastrointestinal:  Negative for abdominal pain, constipation, diarrhea, nausea and vomiting.   Genitourinary:  Negative for difficulty urinating.       Objective  /80   Pulse 68   Temp 36.7 °C (98 °F)   Resp 18   Wt 131 kg (289 lb)   SpO2 96%   BMI 35.18 kg/m²     Physical Exam  Constitutional:       General: He is not in acute distress.     Appearance: He is obese.   HENT:      Head: Normocephalic and atraumatic.   Eyes:      Conjunctiva/sclera: Conjunctivae normal.   Cardiovascular:      Rate and Rhythm: Normal rate and regular rhythm.   Pulmonary:      Effort: Pulmonary effort is normal. No respiratory distress.      Breath sounds: Normal breath sounds.   Abdominal:      General: Bowel sounds are normal. There is no distension.      Palpations: Abdomen is soft.      Tenderness: There is no abdominal tenderness.      Comments: Ostomy draining semi formed brown.   Abdominal incisions healed.    Genitourinary:     Comments: Suprapubic cath draining clear yellow  Musculoskeletal:         General: Normal range of motion.      Right lower leg: No edema.      Left lower leg: No edema.   Skin:     General: Skin is warm and dry.    Neurological:      General: No focal deficit present.      Mental Status: He is alert. Mental status is at baseline.   Psychiatric:         Mood and Affect: Mood normal.         Behavior: Behavior normal.         Assessment/Plan  Problem List Items Addressed This Visit       Insulin resistance syndrome     Awaiting insurance determination regarding semaglutide coverage for obesity.    Risperdal has also been decreased.          Neurogenic bladder     Suprapubic cath.  Functioning well         Neurogenic bowel     Ostomy in place.  Functioning well per staff.          Seizure disorder (Multi)     No known recent seizures.    Staff to monitor and notify for any recurrence of seizure activity           Vitamin D deficiency     monitor with routine labs.           Moderate dementia without behavioral disturbance, psychotic disturbance, mood disturbance, or anxiety, unspecified dementia type - Primary     Mild to moderate.         labs/meds/orders reviewed  staff to monitor and notify for any changes.  continue with therapy as able.  Next full labs 1/25 and prn  Awaiting insurance determination of semaglutide coverage        Electronically Signed By: NASREEN Larios   12/6/24  4:44 PM

## 2024-12-06 ASSESSMENT — ENCOUNTER SYMPTOMS
CONSTIPATION: 0
NAUSEA: 0
COUGH: 0
PALPITATIONS: 0
FEVER: 0
DIARRHEA: 0
DIFFICULTY URINATING: 0
FATIGUE: 0
ABDOMINAL PAIN: 0
CHILLS: 0
SHORTNESS OF BREATH: 0
VOMITING: 0

## 2024-12-06 NOTE — ASSESSMENT & PLAN NOTE
Awaiting insurance determination regarding semaglutide coverage for obesity.    Risperdal has also been decreased.

## 2024-12-06 NOTE — ASSESSMENT & PLAN NOTE
No known recent seizures.    Staff to monitor and notify for any recurrence of seizure activity     Male

## 2024-12-17 ENCOUNTER — NURSING HOME VISIT (OUTPATIENT)
Dept: POST ACUTE CARE | Facility: EXTERNAL LOCATION | Age: 54
End: 2024-12-17
Payer: MEDICARE

## 2024-12-17 VITALS
SYSTOLIC BLOOD PRESSURE: 132 MMHG | HEART RATE: 72 BPM | RESPIRATION RATE: 18 BRPM | OXYGEN SATURATION: 96 % | BODY MASS INDEX: 35.18 KG/M2 | TEMPERATURE: 97.9 F | WEIGHT: 289 LBS | DIASTOLIC BLOOD PRESSURE: 78 MMHG

## 2024-12-17 DIAGNOSIS — N31.9 NEUROGENIC BLADDER: ICD-10-CM

## 2024-12-17 DIAGNOSIS — F39 MOOD DISORDER (CMS-HCC): ICD-10-CM

## 2024-12-17 DIAGNOSIS — R53.81 PHYSICAL DEBILITY: Primary | ICD-10-CM

## 2024-12-17 DIAGNOSIS — G40.909 SEIZURE DISORDER (MULTI): ICD-10-CM

## 2024-12-17 DIAGNOSIS — K59.2 NEUROGENIC BOWEL: ICD-10-CM

## 2024-12-17 PROCEDURE — 99309 SBSQ NF CARE MODERATE MDM 30: CPT | Performed by: NURSE PRACTITIONER

## 2024-12-17 ASSESSMENT — ENCOUNTER SYMPTOMS
DIARRHEA: 0
CHILLS: 0
VOMITING: 0
ABDOMINAL PAIN: 0
FEVER: 0
SHORTNESS OF BREATH: 0
PALPITATIONS: 0
FATIGUE: 0
COUGH: 0
CONSTIPATION: 0
NAUSEA: 0
DIFFICULTY URINATING: 0

## 2024-12-17 NOTE — PROGRESS NOTES
Subjective   Nato Downs is a 54 y.o. male requested by staff to be evaluated.    HPI   Noted by staff to be a little weaker than normal, unable to stand and ambulate to dining room.  Vitals are stable.  Grzegorz states he is feeling ok, no complaints. Staff also mentioned he has been declining to come to the dining room at dinner the last few days.     Grzegorz is is resting in bed , denies any complaints when asked.   No known recent seizures.  Eating and drinking well.   No concerns per staff.          Review of Systems   Constitutional:  Negative for chills, fatigue and fever.   Respiratory:  Negative for cough and shortness of breath.    Cardiovascular:  Negative for chest pain and palpitations.   Gastrointestinal:  Negative for abdominal pain, constipation, diarrhea, nausea and vomiting.   Genitourinary:  Negative for difficulty urinating.       Objective   /78   Pulse 72   Temp 36.6 °C (97.9 °F)   Resp 18   Wt 131 kg (289 lb)   SpO2 96%   BMI 35.18 kg/m²     Physical Exam  Constitutional:       General: He is not in acute distress.     Appearance: He is obese.   HENT:      Head: Normocephalic and atraumatic.   Eyes:      Conjunctiva/sclera: Conjunctivae normal.   Cardiovascular:      Rate and Rhythm: Normal rate and regular rhythm.   Pulmonary:      Effort: Pulmonary effort is normal. No respiratory distress.      Breath sounds: Normal breath sounds.   Abdominal:      General: Bowel sounds are normal. There is no distension.      Palpations: Abdomen is soft.      Tenderness: There is no abdominal tenderness.      Comments: Ostomy draining semi formed brown.   Abdominal incisions healed.    Genitourinary:     Comments: Suprapubic cath draining clear yellow  Musculoskeletal:         General: Normal range of motion.      Right lower leg: No edema.      Left lower leg: No edema.   Skin:     General: Skin is warm and dry.   Neurological:      General: No focal deficit present.      Mental Status: He is  alert. Mental status is at baseline.   Psychiatric:         Mood and Affect: Mood normal.         Behavior: Behavior normal.         Assessment/Plan   Problem List Items Addressed This Visit       Mood disorder (CMS-HCC)     Risperdal has been recently decreased.  He has been starting to decline going to the dining room at dinner which is unlike him.    No outbursts but decreased functional status and will check labs to evaluate for any underlying pathology         Neurogenic bladder     Suprapubic cath.  Functioning well         Neurogenic bowel     Ostomy in place.  Functioning well per staff.          Physical debility - Primary     He has completed therapy, the last few days has had decreased functional status, labs ordered and pending           Seizure disorder (Multi)     No known recent seizures.    Staff to monitor and notify for any recurrence of seizure activity          labs/meds/orders reviewed  staff to monitor and notify for any changes.  continue with therapy as able.  Next full labs 1/25 and prn  Will check labs to evaluate for any underlying pathology, risperdal was recently decreased.

## 2024-12-17 NOTE — ASSESSMENT & PLAN NOTE
He has completed therapy, the last few days has had decreased functional status, labs ordered and pending

## 2024-12-17 NOTE — ASSESSMENT & PLAN NOTE
Risperdal has been recently decreased.  He has been starting to decline going to the dining room at dinner which is unlike him.    No outbursts but decreased functional status and will check labs to evaluate for any underlying pathology

## 2024-12-17 NOTE — LETTER
Patient: Nato Downs  : 1970    Encounter Date: 2024      Subjective  Nato Downs is a 54 y.o. male requested by staff to be evaluated.    HPI   Noted by staff to be a little weaker than normal, unable to stand and ambulate to dining room.  Vitals are stable.  Grzegorz states he is feeling ok, no complaints. Staff also mentioned he has been declining to come to the dining room at dinner the last few days.     Grzegorz is is resting in bed , denies any complaints when asked.   No known recent seizures.  Eating and drinking well.   No concerns per staff.          Review of Systems   Constitutional:  Negative for chills, fatigue and fever.   Respiratory:  Negative for cough and shortness of breath.    Cardiovascular:  Negative for chest pain and palpitations.   Gastrointestinal:  Negative for abdominal pain, constipation, diarrhea, nausea and vomiting.   Genitourinary:  Negative for difficulty urinating.       Objective  /78   Pulse 72   Temp 36.6 °C (97.9 °F)   Resp 18   Wt 131 kg (289 lb)   SpO2 96%   BMI 35.18 kg/m²     Physical Exam  Constitutional:       General: He is not in acute distress.     Appearance: He is obese.   HENT:      Head: Normocephalic and atraumatic.   Eyes:      Conjunctiva/sclera: Conjunctivae normal.   Cardiovascular:      Rate and Rhythm: Normal rate and regular rhythm.   Pulmonary:      Effort: Pulmonary effort is normal. No respiratory distress.      Breath sounds: Normal breath sounds.   Abdominal:      General: Bowel sounds are normal. There is no distension.      Palpations: Abdomen is soft.      Tenderness: There is no abdominal tenderness.      Comments: Ostomy draining semi formed brown.   Abdominal incisions healed.    Genitourinary:     Comments: Suprapubic cath draining clear yellow  Musculoskeletal:         General: Normal range of motion.      Right lower leg: No edema.      Left lower leg: No edema.   Skin:     General: Skin is warm and dry.    Neurological:      General: No focal deficit present.      Mental Status: He is alert. Mental status is at baseline.   Psychiatric:         Mood and Affect: Mood normal.         Behavior: Behavior normal.         Assessment/Plan  Problem List Items Addressed This Visit       Mood disorder (CMS-HCC)     Risperdal has been recently decreased.  He has been starting to decline going to the dining room at dinner which is unlike him.    No outbursts but decreased functional status and will check labs to evaluate for any underlying pathology         Neurogenic bladder     Suprapubic cath.  Functioning well         Neurogenic bowel     Ostomy in place.  Functioning well per staff.          Physical debility - Primary     He has completed therapy, the last few days has had decreased functional status, labs ordered and pending           Seizure disorder (Multi)     No known recent seizures.    Staff to monitor and notify for any recurrence of seizure activity          labs/meds/orders reviewed  staff to monitor and notify for any changes.  continue with therapy as able.  Next full labs 1/25 and prn  Will check labs to evaluate for any underlying pathology, risperdal was recently decreased.          Electronically Signed By: NASREEN Larios   12/17/24  3:04 PM

## 2024-12-19 ENCOUNTER — NURSING HOME VISIT (OUTPATIENT)
Dept: POST ACUTE CARE | Facility: EXTERNAL LOCATION | Age: 54
End: 2024-12-19
Payer: MEDICARE

## 2024-12-19 DIAGNOSIS — G40.909 SEIZURE DISORDER (MULTI): ICD-10-CM

## 2024-12-19 DIAGNOSIS — F03.B0 MODERATE DEMENTIA WITHOUT BEHAVIORAL DISTURBANCE, PSYCHOTIC DISTURBANCE, MOOD DISTURBANCE, OR ANXIETY, UNSPECIFIED DEMENTIA TYPE: ICD-10-CM

## 2024-12-19 DIAGNOSIS — Z93.59 SUPRAPUBIC CATHETER (MULTI): ICD-10-CM

## 2024-12-19 DIAGNOSIS — Q07.9 CONGENITAL ENCEPHALOPATHY (MULTI): Primary | ICD-10-CM

## 2024-12-19 DIAGNOSIS — E66.9 OBESITY, UNSPECIFIED CLASS, UNSPECIFIED OBESITY TYPE, UNSPECIFIED WHETHER SERIOUS COMORBIDITY PRESENT: ICD-10-CM

## 2024-12-19 DIAGNOSIS — Z93.2 ILEOSTOMY IN PLACE (MULTI): ICD-10-CM

## 2024-12-19 DIAGNOSIS — E88.810 INSULIN RESISTANCE SYNDROME: ICD-10-CM

## 2024-12-19 PROCEDURE — 99309 SBSQ NF CARE MODERATE MDM 30: CPT | Performed by: INTERNAL MEDICINE

## 2024-12-19 NOTE — LETTER
Patient: Nato Downs  : 1970    Encounter Date: 2024    Subjective  Patient ID: Nato Downs is a 54 y.o. male who is long term resident being seen and evaluated for multiple medical problems.    HPI   54-year-old male patient is resting quietly in his bed in no distress.  He appears comfortable and nursing has no new adverse events towards me.    Current high risk medication:  Clonazepam  Diastat rectal gel  Ditropan  Keppra  Risperidone  Trileptal    Laboratory examination from 2024 reviewed and in detail and include hemoglobin 10.7 previously 11  Albumin 3.6  Liver injury test normal    Review of Systems   Constitutional:  Negative for chills, fatigue and fever.   Respiratory:  Negative for cough and shortness of breath.    Cardiovascular:  Negative for chest pain and palpitations.   Gastrointestinal:  Negative for abdominal pain, constipation, diarrhea, nausea and vomiting.   Genitourinary:  Negative for difficulty urinating.       Objective  /78   Pulse 72   Resp 18   Wt 131 kg (289 lb)   SpO2 96%   BMI 35.18 kg/m²     Physical Exam  Constitutional:       General: He is not in acute distress.     Appearance: He is obese.   HENT:      Head: Normocephalic and atraumatic.   Eyes:      Conjunctiva/sclera: Conjunctivae normal.   Cardiovascular:      Rate and Rhythm: Normal rate and regular rhythm.   Pulmonary:      Effort: Pulmonary effort is normal. No respiratory distress.      Breath sounds: Normal breath sounds.   Abdominal:      General: Bowel sounds are normal. There is no distension.      Palpations: Abdomen is soft.      Tenderness: There is no abdominal tenderness.      Comments: Ostomy draining semi formed brown.   Abdominal incisions healed.    Genitourinary:     Comments: Suprapubic cath draining clear yellow  Musculoskeletal:         General: Normal range of motion.      Right lower leg: No edema.      Left lower leg: No edema.   Skin:     General: Skin is warm and  dry.   Neurological:      General: No focal deficit present.      Mental Status: He is alert. Mental status is at baseline.   Psychiatric:         Mood and Affect: Mood normal.         Behavior: Behavior normal.         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  We will continue with restorative and supportive care as the patient tolerates    B.  The patient has obesity likely related to his antipsychotic regimen and the resultant insulin resistance syndrome related to this.  He would be an excellent candidate for GLP-1 therapy however this is very likely excessively costly in his particular case.  Where he to unfortunately developed diabetes then these medications would likely be covered.    C.  Will continue with encouragement for diet and exercise for weight loss    D.  Laboratory examinations will next be due in January 2025 or as needed to include A1c screening for diabetes and long-term treatment with antipsychotic therapy    E.  The patient's prognosis is guarded.          Electronically Signed By: Amilcar Quezada MD   1/7/25 10:12 AM

## 2024-12-20 VITALS
BODY MASS INDEX: 35.18 KG/M2 | WEIGHT: 289 LBS | SYSTOLIC BLOOD PRESSURE: 132 MMHG | DIASTOLIC BLOOD PRESSURE: 78 MMHG | HEART RATE: 72 BPM | OXYGEN SATURATION: 96 % | RESPIRATION RATE: 18 BRPM

## 2024-12-20 NOTE — PROGRESS NOTES
Subjective   Patient ID: Nato Downs is a 54 y.o. male who is long term resident being seen and evaluated for multiple medical problems.    HPI   54-year-old male patient is resting quietly in his bed in no distress.  He appears comfortable and nursing has no new adverse events towards me.    Current high risk medication:  Clonazepam  Diastat rectal gel  Ditropan  Keppra  Risperidone  Trileptal    Laboratory examination from July 2024 reviewed and in detail and include hemoglobin 10.7 previously 11  Albumin 3.6  Liver injury test normal    Review of Systems   Constitutional:  Negative for chills, fatigue and fever.   Respiratory:  Negative for cough and shortness of breath.    Cardiovascular:  Negative for chest pain and palpitations.   Gastrointestinal:  Negative for abdominal pain, constipation, diarrhea, nausea and vomiting.   Genitourinary:  Negative for difficulty urinating.       Objective   /78   Pulse 72   Resp 18   Wt 131 kg (289 lb)   SpO2 96%   BMI 35.18 kg/m²     Physical Exam  Constitutional:       General: He is not in acute distress.     Appearance: He is obese.   HENT:      Head: Normocephalic and atraumatic.   Eyes:      Conjunctiva/sclera: Conjunctivae normal.   Cardiovascular:      Rate and Rhythm: Normal rate and regular rhythm.   Pulmonary:      Effort: Pulmonary effort is normal. No respiratory distress.      Breath sounds: Normal breath sounds.   Abdominal:      General: Bowel sounds are normal. There is no distension.      Palpations: Abdomen is soft.      Tenderness: There is no abdominal tenderness.      Comments: Ostomy draining semi formed brown.   Abdominal incisions healed.    Genitourinary:     Comments: Suprapubic cath draining clear yellow  Musculoskeletal:         General: Normal range of motion.      Right lower leg: No edema.      Left lower leg: No edema.   Skin:     General: Skin is warm and dry.   Neurological:      General: No focal deficit present.      Mental  Status: He is alert. Mental status is at baseline.   Psychiatric:         Mood and Affect: Mood normal.         Behavior: Behavior normal.         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  We will continue with restorative and supportive care as the patient tolerates    B.  The patient has obesity likely related to his antipsychotic regimen and the resultant insulin resistance syndrome related to this.  He would be an excellent candidate for GLP-1 therapy however this is very likely excessively costly in his particular case.  Where he to unfortunately developed diabetes then these medications would likely be covered.    C.  Will continue with encouragement for diet and exercise for weight loss    D.  Laboratory examinations will next be due in January 2025 or as needed to include A1c screening for diabetes and long-term treatment with antipsychotic therapy    E.  The patient's prognosis is guarded.

## 2025-01-07 ASSESSMENT — ENCOUNTER SYMPTOMS
NAUSEA: 0
ABDOMINAL PAIN: 0
CHILLS: 0
DIARRHEA: 0
CONSTIPATION: 0
SHORTNESS OF BREATH: 0
COUGH: 0
FATIGUE: 0
FEVER: 0
DIFFICULTY URINATING: 0
VOMITING: 0
PALPITATIONS: 0

## 2025-01-17 ENCOUNTER — NURSING HOME VISIT (OUTPATIENT)
Dept: POST ACUTE CARE | Facility: EXTERNAL LOCATION | Age: 55
End: 2025-01-17
Payer: MEDICARE

## 2025-01-17 VITALS
OXYGEN SATURATION: 96 % | RESPIRATION RATE: 18 BRPM | BODY MASS INDEX: 35.67 KG/M2 | SYSTOLIC BLOOD PRESSURE: 132 MMHG | WEIGHT: 293 LBS | HEART RATE: 88 BPM | DIASTOLIC BLOOD PRESSURE: 78 MMHG

## 2025-01-17 DIAGNOSIS — F29 UNSP PSYCHOSIS NOT DUE TO A SUBSTANCE OR KNOWN PHYSIOL COND (MULTI): ICD-10-CM

## 2025-01-17 DIAGNOSIS — T83.511D URINARY TRACT INFECTION ASSOCIATED WITH CATHETERIZATION OF URINARY TRACT, UNSPECIFIED INDWELLING URINARY CATHETER TYPE, SUBSEQUENT ENCOUNTER: Primary | ICD-10-CM

## 2025-01-17 DIAGNOSIS — R56.9 SEIZURE (MULTI): ICD-10-CM

## 2025-01-17 DIAGNOSIS — N39.0 URINARY TRACT INFECTION ASSOCIATED WITH CATHETERIZATION OF URINARY TRACT, UNSPECIFIED INDWELLING URINARY CATHETER TYPE, SUBSEQUENT ENCOUNTER: Primary | ICD-10-CM

## 2025-01-17 DIAGNOSIS — E66.01 OBESITY, MORBID (MULTI): ICD-10-CM

## 2025-01-17 DIAGNOSIS — Q07.9 CONGENITAL ENCEPHALOPATHY (MULTI): ICD-10-CM

## 2025-01-17 DIAGNOSIS — F03.B4 MODERATE DEMENTIA WITH ANXIETY, UNSPECIFIED DEMENTIA TYPE (MULTI): ICD-10-CM

## 2025-01-17 DIAGNOSIS — Z93.59 SUPRAPUBIC CATHETER (MULTI): ICD-10-CM

## 2025-01-17 PROCEDURE — 99309 SBSQ NF CARE MODERATE MDM 30: CPT | Performed by: INTERNAL MEDICINE

## 2025-01-17 NOTE — PROGRESS NOTES
Subjective   Patient ID: Nato Downs is a 54 y.o. male who is long term resident being seen and evaluated for multiple medical problems.    HPI   54-year-old male patient is resting comfortably in his bed in no distress.  He awakens easily and is in good spirits.  He has no complaints of pain or shortness of breath me at this time.  He has recently been treated with Bactrim for urinary tract infection which was polymicrobial in nature and suprapubic.  Nursing has no new other adverse events reported to me at this time.    Current high risk medication:  Clonazepam  Diastat   Ditropan  Keppra  Risperidone  Trileptal    Laboratory examination from December 28, 2024 reviewed in detail by me.  And include CMP normal except for albumin 3.4  Hemoglobin 11.5  Vitamin D normal  Trileptal and Keppra levels are therapeutic    Review of Systems   Constitutional:  Negative for chills, fatigue and fever.   Respiratory:  Negative for cough and shortness of breath.    Cardiovascular:  Negative for chest pain and palpitations.   Gastrointestinal:  Negative for abdominal pain, constipation, diarrhea, nausea and vomiting.   Genitourinary:  Negative for difficulty urinating.       Objective   /78   Pulse 88   Resp 18   Wt 133 kg (293 lb)   SpO2 96%   BMI 35.67 kg/m²     Physical Exam  Constitutional:       General: He is not in acute distress.     Appearance: He is obese.   HENT:      Head: Normocephalic and atraumatic.   Eyes:      Conjunctiva/sclera: Conjunctivae normal.   Cardiovascular:      Rate and Rhythm: Normal rate and regular rhythm.   Pulmonary:      Effort: Pulmonary effort is normal. No respiratory distress.      Breath sounds: Normal breath sounds.   Abdominal:      General: Bowel sounds are normal. There is no distension.      Palpations: Abdomen is soft.      Tenderness: There is no abdominal tenderness.      Comments: Ostomy draining semi formed brown.   Abdominal incisions healed.    Genitourinary:      Comments: Suprapubic cath draining clear yellow  Musculoskeletal:         General: Normal range of motion.      Right lower leg: No edema.      Left lower leg: No edema.   Skin:     General: Skin is warm and dry.   Neurological:      General: No focal deficit present.      Mental Status: He is alert. Mental status is at baseline.   Psychiatric:         Mood and Affect: Mood normal.         Behavior: Behavior normal.         Assessment/Plan   Problem List Items Addressed This Visit             ICD-10-CM    Congenital encephalopathy (Multi) Q07.9     Appears to be stable and not progressing         Seizure disorder (Multi) G40.909     No seizure disorders been reported in the past months.         Suprapubic catheter (Multi) Z93.59     Monitored by urology         Uns psychosis not due to a substance or known physiol cond (Multi) F29     Currently quiescent on his medical regimen         Urinary tract infection associated with catheterization of urinary tract (CMS-HCC) - Primary T83.511A, N39.0    Moderate dementia without behavioral disturbance, psychotic disturbance, mood disturbance, or anxiety, unspecified dementia type F03.B0     Appears to be stable and not progressing         Obesity, morbid (Multi) E66.01     Ongoing efforts at diet and exercise for weight loss.          8.  We will continue with restorative and supportive care as the patient tolerates    B.  Ongoing monitoring of the patient's suprapubic catheter for recurrence of polymicrobial UTI although it is most likely that any sediment in his urine is due to colonization of the Benitez catheter and not infection    C.  Laboratory examinations will continue be monitored on an ongoing as-needed basis and should next be due in June 2025 or as needed    D.  The patient's prognosis is guarded.

## 2025-01-17 NOTE — LETTER
Patient: Nato Downs  : 1970    Encounter Date: 2025    Subjective  Patient ID: Nato Downs is a 54 y.o. male who is long term resident being seen and evaluated for multiple medical problems.    HPI   54-year-old male patient is resting comfortably in his bed in no distress.  He awakens easily and is in good spirits.  He has no complaints of pain or shortness of breath me at this time.  He has recently been treated with Bactrim for urinary tract infection which was polymicrobial in nature and suprapubic.  Nursing has no new other adverse events reported to me at this time.    Current high risk medication:  Clonazepam  Diastat   Ditropan  Keppra  Risperidone  Trileptal    Laboratory examination from 2024 reviewed in detail by me.  And include CMP normal except for albumin 3.4  Hemoglobin 11.5  Vitamin D normal  Trileptal and Keppra levels are therapeutic    Review of Systems   Constitutional:  Negative for chills, fatigue and fever.   Respiratory:  Negative for cough and shortness of breath.    Cardiovascular:  Negative for chest pain and palpitations.   Gastrointestinal:  Negative for abdominal pain, constipation, diarrhea, nausea and vomiting.   Genitourinary:  Negative for difficulty urinating.       Objective  /78   Pulse 88   Resp 18   Wt 133 kg (293 lb)   SpO2 96%   BMI 35.67 kg/m²     Physical Exam  Constitutional:       General: He is not in acute distress.     Appearance: He is obese.   HENT:      Head: Normocephalic and atraumatic.   Eyes:      Conjunctiva/sclera: Conjunctivae normal.   Cardiovascular:      Rate and Rhythm: Normal rate and regular rhythm.   Pulmonary:      Effort: Pulmonary effort is normal. No respiratory distress.      Breath sounds: Normal breath sounds.   Abdominal:      General: Bowel sounds are normal. There is no distension.      Palpations: Abdomen is soft.      Tenderness: There is no abdominal tenderness.      Comments: Ostomy draining  semi formed brown.   Abdominal incisions healed.    Genitourinary:     Comments: Suprapubic cath draining clear yellow  Musculoskeletal:         General: Normal range of motion.      Right lower leg: No edema.      Left lower leg: No edema.   Skin:     General: Skin is warm and dry.   Neurological:      General: No focal deficit present.      Mental Status: He is alert. Mental status is at baseline.   Psychiatric:         Mood and Affect: Mood normal.         Behavior: Behavior normal.         Assessment/Plan  Problem List Items Addressed This Visit             ICD-10-CM    Congenital encephalopathy (Multi) Q07.9     Appears to be stable and not progressing         Seizure disorder (Multi) G40.909     No seizure disorders been reported in the past months.         Suprapubic catheter (Multi) Z93.59     Monitored by urology         Unsp psychosis not due to a substance or known physiol cond (Multi) F29     Currently quiescent on his medical regimen         Urinary tract infection associated with catheterization of urinary tract (CMS-[facility]) - Primary T83.511A, N39.0    Moderate dementia without behavioral disturbance, psychotic disturbance, mood disturbance, or anxiety, unspecified dementia type F03.B0     Appears to be stable and not progressing         Obesity, morbid (Multi) E66.01     Ongoing efforts at diet and exercise for weight loss.          8.  We will continue with restorative and supportive care as the patient tolerates    B.  Ongoing monitoring of the patient's suprapubic catheter for recurrence of polymicrobial UTI although it is most likely that any sediment in his urine is due to colonization of the Benitez catheter and not infection    C.  Laboratory examinations will continue be monitored on an ongoing as-needed basis and should next be due in June 2025 or as needed    D.  The patient's prognosis is guarded.        Electronically Signed By: Amilcar Quezada MD   1/23/25  4:34 PM

## 2025-01-23 PROBLEM — E66.01 OBESITY, MORBID (MULTI): Status: ACTIVE | Noted: 2025-01-23

## 2025-01-23 ASSESSMENT — ENCOUNTER SYMPTOMS
DIFFICULTY URINATING: 0
CHILLS: 0
SHORTNESS OF BREATH: 0
NAUSEA: 0
COUGH: 0
CONSTIPATION: 0
FATIGUE: 0
DIARRHEA: 0
FEVER: 0
VOMITING: 0
PALPITATIONS: 0
ABDOMINAL PAIN: 0

## 2025-02-11 ENCOUNTER — NURSING HOME VISIT (OUTPATIENT)
Dept: POST ACUTE CARE | Facility: EXTERNAL LOCATION | Age: 55
End: 2025-02-11
Payer: MEDICARE

## 2025-02-11 VITALS
DIASTOLIC BLOOD PRESSURE: 76 MMHG | HEART RATE: 82 BPM | TEMPERATURE: 97.6 F | OXYGEN SATURATION: 96 % | RESPIRATION RATE: 18 BRPM | BODY MASS INDEX: 35.54 KG/M2 | WEIGHT: 292 LBS | SYSTOLIC BLOOD PRESSURE: 123 MMHG

## 2025-02-11 DIAGNOSIS — Q07.9 CONGENITAL ENCEPHALOPATHY (MULTI): ICD-10-CM

## 2025-02-11 DIAGNOSIS — R53.81 PHYSICAL DEBILITY: ICD-10-CM

## 2025-02-11 DIAGNOSIS — G40.909 SEIZURE DISORDER (MULTI): Primary | ICD-10-CM

## 2025-02-11 DIAGNOSIS — N31.9 NEUROGENIC BLADDER: ICD-10-CM

## 2025-02-11 DIAGNOSIS — E55.9 VITAMIN D DEFICIENCY: ICD-10-CM

## 2025-02-11 PROCEDURE — 99309 SBSQ NF CARE MODERATE MDM 30: CPT | Performed by: NURSE PRACTITIONER

## 2025-02-11 ASSESSMENT — ENCOUNTER SYMPTOMS
CHILLS: 0
DIFFICULTY URINATING: 0
DIARRHEA: 0
VOMITING: 0
COUGH: 0
FATIGUE: 0
NAUSEA: 0
ABDOMINAL PAIN: 0
FEVER: 0
PALPITATIONS: 0
SHORTNESS OF BREATH: 0
CONSTIPATION: 0

## 2025-02-11 NOTE — LETTER
Patient: Nato Downs  : 1970    Encounter Date: 2025      Subjective  Nato Downs is a 54 y.o. male here for routine visit.   HPI   There are no new problems and multiple health problems have been reviewed.  The course has been unchanged.  The patient  is moderately confused.   There are no family members present during time of visit.    Grzegorz dougherty is resting in bed , denies any complaints when asked.   No known recent seizures.  Eating and drinking well.   No concerns per staff.           Review of Systems   Constitutional:  Negative for chills, fatigue and fever.   Respiratory:  Negative for cough and shortness of breath.    Cardiovascular:  Negative for chest pain and palpitations.   Gastrointestinal:  Negative for abdominal pain, constipation, diarrhea, nausea and vomiting.   Genitourinary:  Negative for difficulty urinating.       Objective  /76   Pulse 82   Temp 36.4 °C (97.6 °F)   Resp 18   Wt 132 kg (292 lb)   SpO2 96%   BMI 35.54 kg/m²     Physical Exam  Constitutional:       General: He is not in acute distress.     Appearance: He is obese.   HENT:      Head: Normocephalic and atraumatic.   Eyes:      Conjunctiva/sclera: Conjunctivae normal.   Cardiovascular:      Rate and Rhythm: Normal rate and regular rhythm.   Pulmonary:      Effort: Pulmonary effort is normal. No respiratory distress.      Breath sounds: Normal breath sounds.   Abdominal:      General: Bowel sounds are normal. There is no distension.      Palpations: Abdomen is soft.      Tenderness: There is no abdominal tenderness.      Comments: Ostomy draining semi formed brown.   Abdominal incisions healed.    Genitourinary:     Comments: Suprapubic cath draining clear yellow  Musculoskeletal:         General: Normal range of motion.      Right lower leg: No edema.      Left lower leg: No edema.   Skin:     General: Skin is warm and dry.   Neurological:      General: No focal deficit present.      Mental Status: He  is alert. Mental status is at baseline.   Psychiatric:         Mood and Affect: Mood normal.         Behavior: Behavior normal.         Assessment/Plan  Problem List Items Addressed This Visit       Neurogenic bladder     Suprapubic cath.  Functioning well         Physical debility     He is ambulating better around the facility.  staff to monitor and notify for any changes.             Congenital encephalopathy (Multi)     Unable to live independently         Seizure disorder (Multi) - Primary     No known recent seizures.    Staff to monitor and notify for any recurrence of seizure activity           Vitamin D deficiency     monitor with routine labs.          labs/meds/orders reviewed  staff to monitor and notify for any changes.  continue with therapy as able.  Next full labs 6/25 and prn          Electronically Signed By: CHARLA Larios-CNP   2/11/25 10:34 AM

## 2025-02-11 NOTE — PROGRESS NOTES
Subjective   Nato Downs is a 54 y.o. male here for routine visit.   HPI   There are no new problems and multiple health problems have been reviewed.  The course has been unchanged.  The patient  is moderately confused.   There are no family members present during time of visit.    Grzegorz dougherty is resting in bed , denies any complaints when asked.   No known recent seizures.  Eating and drinking well.   No concerns per staff.           Review of Systems   Constitutional:  Negative for chills, fatigue and fever.   Respiratory:  Negative for cough and shortness of breath.    Cardiovascular:  Negative for chest pain and palpitations.   Gastrointestinal:  Negative for abdominal pain, constipation, diarrhea, nausea and vomiting.   Genitourinary:  Negative for difficulty urinating.       Objective   /76   Pulse 82   Temp 36.4 °C (97.6 °F)   Resp 18   Wt 132 kg (292 lb)   SpO2 96%   BMI 35.54 kg/m²     Physical Exam  Constitutional:       General: He is not in acute distress.     Appearance: He is obese.   HENT:      Head: Normocephalic and atraumatic.   Eyes:      Conjunctiva/sclera: Conjunctivae normal.   Cardiovascular:      Rate and Rhythm: Normal rate and regular rhythm.   Pulmonary:      Effort: Pulmonary effort is normal. No respiratory distress.      Breath sounds: Normal breath sounds.   Abdominal:      General: Bowel sounds are normal. There is no distension.      Palpations: Abdomen is soft.      Tenderness: There is no abdominal tenderness.      Comments: Ostomy draining semi formed brown.   Abdominal incisions healed.    Genitourinary:     Comments: Suprapubic cath draining clear yellow  Musculoskeletal:         General: Normal range of motion.      Right lower leg: No edema.      Left lower leg: No edema.   Skin:     General: Skin is warm and dry.   Neurological:      General: No focal deficit present.      Mental Status: He is alert. Mental status is at baseline.   Psychiatric:         Mood and  Affect: Mood normal.         Behavior: Behavior normal.         Assessment/Plan   Problem List Items Addressed This Visit       Neurogenic bladder     Suprapubic cath.  Functioning well         Physical debility     He is ambulating better around the facility.  staff to monitor and notify for any changes.             Congenital encephalopathy (Multi)     Unable to live independently         Seizure disorder (Multi) - Primary     No known recent seizures.    Staff to monitor and notify for any recurrence of seizure activity           Vitamin D deficiency     monitor with routine labs.          labs/meds/orders reviewed  staff to monitor and notify for any changes.  continue with therapy as able.  Next full labs 6/25 and prn

## 2025-02-14 ENCOUNTER — NURSING HOME VISIT (OUTPATIENT)
Dept: POST ACUTE CARE | Facility: EXTERNAL LOCATION | Age: 55
End: 2025-02-14
Payer: MEDICARE

## 2025-02-14 DIAGNOSIS — G40.909 SEIZURE DISORDER (MULTI): Primary | ICD-10-CM

## 2025-02-14 DIAGNOSIS — F03.B0 MODERATE DEMENTIA WITHOUT BEHAVIORAL DISTURBANCE, PSYCHOTIC DISTURBANCE, MOOD DISTURBANCE, OR ANXIETY, UNSPECIFIED DEMENTIA TYPE: ICD-10-CM

## 2025-02-14 DIAGNOSIS — E66.01 OBESITY, MORBID (MULTI): ICD-10-CM

## 2025-02-14 DIAGNOSIS — F79 ACQUIRED INTELLECTUAL DISABILITY: ICD-10-CM

## 2025-02-14 PROCEDURE — 99308 SBSQ NF CARE LOW MDM 20: CPT | Performed by: INTERNAL MEDICINE

## 2025-02-14 NOTE — LETTER
Patient: Nato Downs  : 1970    Encounter Date: 2025    Subjective  Patient ID: Nato Downs is a 54 y.o. male who is long term resident being seen and evaluated for multiple medical problems.    HPI   This 54-year-old male patient is resting comfortably in his room in no distress.  He is watching television.  He has no complaints of pain.  Nursing has no new adverse events reported to me.    Current high risk medication:  Clonazepam  Diastat  Keppra  Risperidone  Trileptal    Laboratory examination from 2024 reviewed in detail by me and include:  Albumin 3.4  Hemoglobin 11.5  Keppra and Trileptal levels are normal  Vitamin D normal    Review of Systems   Constitutional:  Negative for chills, fatigue and fever.   Respiratory:  Negative for cough and shortness of breath.    Cardiovascular:  Negative for chest pain and palpitations.   Gastrointestinal:  Negative for abdominal pain, constipation, diarrhea, nausea and vomiting.   Genitourinary:  Negative for difficulty urinating.       Objective  /76   Pulse 82   Resp 18   Wt 132 kg (292 lb)   SpO2 96%   BMI 35.54 kg/m²     Physical Exam  Constitutional:       General: He is not in acute distress.     Appearance: He is obese.   HENT:      Head: Normocephalic and atraumatic.   Eyes:      Conjunctiva/sclera: Conjunctivae normal.   Cardiovascular:      Rate and Rhythm: Normal rate and regular rhythm.   Pulmonary:      Effort: Pulmonary effort is normal. No respiratory distress.      Breath sounds: Normal breath sounds.   Abdominal:      General: Bowel sounds are normal. There is no distension.      Palpations: Abdomen is soft.      Tenderness: There is no abdominal tenderness.      Comments: Ostomy draining semi formed brown.   Abdominal incisions healed.    Genitourinary:     Comments: Suprapubic cath draining clear yellow  Musculoskeletal:         General: Normal range of motion.      Right lower leg: No edema.      Left lower  leg: No edema.   Skin:     General: Skin is warm and dry.   Neurological:      General: No focal deficit present.      Mental Status: He is alert. Mental status is at baseline.   Psychiatric:         Mood and Affect: Mood normal.         Behavior: Behavior normal.         Assessment/Plan  Problem List Items Addressed This Visit             ICD-10-CM    Acquired intellectual disability F79    Seizure disorder (Multi) - Primary G40.909    Moderate dementia without behavioral disturbance, psychotic disturbance, mood disturbance, or anxiety, unspecified dementia type F03.B0    Obesity, morbid (Multi) E66.01     A.  We will continue with restorative and supportive care as the patient tolerates    B.  Laboratory examinations will next be due in June 2025 or as needed    C.  The patient's prognosis is guarded.        Electronically Signed By: Amilcar Quezada MD   2/26/25  4:27 PM

## 2025-02-17 VITALS
HEART RATE: 82 BPM | DIASTOLIC BLOOD PRESSURE: 76 MMHG | WEIGHT: 292 LBS | BODY MASS INDEX: 35.54 KG/M2 | OXYGEN SATURATION: 96 % | SYSTOLIC BLOOD PRESSURE: 123 MMHG | RESPIRATION RATE: 18 BRPM

## 2025-02-17 NOTE — PROGRESS NOTES
Subjective   Patient ID: Nato Downs is a 54 y.o. male who is long term resident being seen and evaluated for multiple medical problems.    HPI   This 54-year-old male patient is resting comfortably in his room in no distress.  He is watching television.  He has no complaints of pain.  Nursing has no new adverse events reported to me.    Current high risk medication:  Clonazepam  Diastat  Keppra  Risperidone  Trileptal    Laboratory examination from December 20, 2024 reviewed in detail by me and include:  Albumin 3.4  Hemoglobin 11.5  Keppra and Trileptal levels are normal  Vitamin D normal    Review of Systems   Constitutional:  Negative for chills, fatigue and fever.   Respiratory:  Negative for cough and shortness of breath.    Cardiovascular:  Negative for chest pain and palpitations.   Gastrointestinal:  Negative for abdominal pain, constipation, diarrhea, nausea and vomiting.   Genitourinary:  Negative for difficulty urinating.       Objective   /76   Pulse 82   Resp 18   Wt 132 kg (292 lb)   SpO2 96%   BMI 35.54 kg/m²     Physical Exam  Constitutional:       General: He is not in acute distress.     Appearance: He is obese.   HENT:      Head: Normocephalic and atraumatic.   Eyes:      Conjunctiva/sclera: Conjunctivae normal.   Cardiovascular:      Rate and Rhythm: Normal rate and regular rhythm.   Pulmonary:      Effort: Pulmonary effort is normal. No respiratory distress.      Breath sounds: Normal breath sounds.   Abdominal:      General: Bowel sounds are normal. There is no distension.      Palpations: Abdomen is soft.      Tenderness: There is no abdominal tenderness.      Comments: Ostomy draining semi formed brown.   Abdominal incisions healed.    Genitourinary:     Comments: Suprapubic cath draining clear yellow  Musculoskeletal:         General: Normal range of motion.      Right lower leg: No edema.      Left lower leg: No edema.   Skin:     General: Skin is warm and dry.    Neurological:      General: No focal deficit present.      Mental Status: He is alert. Mental status is at baseline.   Psychiatric:         Mood and Affect: Mood normal.         Behavior: Behavior normal.         Assessment/Plan   Problem List Items Addressed This Visit             ICD-10-CM    Acquired intellectual disability F79    Seizure disorder (Multi) - Primary G40.909    Moderate dementia without behavioral disturbance, psychotic disturbance, mood disturbance, or anxiety, unspecified dementia type F03.B0    Obesity, morbid (Multi) E66.01     A.  We will continue with restorative and supportive care as the patient tolerates    B.  Laboratory examinations will next be due in June 2025 or as needed    C.  The patient's prognosis is guarded.

## 2025-02-26 ASSESSMENT — ENCOUNTER SYMPTOMS
VOMITING: 0
FATIGUE: 0
CHILLS: 0
DIARRHEA: 0
SHORTNESS OF BREATH: 0
DIFFICULTY URINATING: 0
ABDOMINAL PAIN: 0
COUGH: 0
PALPITATIONS: 0
FEVER: 0
CONSTIPATION: 0
NAUSEA: 0

## 2025-03-04 ENCOUNTER — NURSING HOME VISIT (OUTPATIENT)
Dept: POST ACUTE CARE | Facility: EXTERNAL LOCATION | Age: 55
End: 2025-03-04
Payer: MEDICARE

## 2025-03-04 VITALS
BODY MASS INDEX: 35.67 KG/M2 | OXYGEN SATURATION: 96 % | WEIGHT: 293 LBS | DIASTOLIC BLOOD PRESSURE: 70 MMHG | TEMPERATURE: 97.8 F | SYSTOLIC BLOOD PRESSURE: 118 MMHG | HEART RATE: 82 BPM | RESPIRATION RATE: 18 BRPM

## 2025-03-04 DIAGNOSIS — K59.2 NEUROGENIC BOWEL: ICD-10-CM

## 2025-03-04 DIAGNOSIS — G40.909 SEIZURE DISORDER (MULTI): ICD-10-CM

## 2025-03-04 DIAGNOSIS — F03.B0 MODERATE DEMENTIA WITHOUT BEHAVIORAL DISTURBANCE, PSYCHOTIC DISTURBANCE, MOOD DISTURBANCE, OR ANXIETY, UNSPECIFIED DEMENTIA TYPE: Primary | ICD-10-CM

## 2025-03-04 DIAGNOSIS — N31.9 NEUROGENIC BLADDER: ICD-10-CM

## 2025-03-04 PROCEDURE — 99309 SBSQ NF CARE MODERATE MDM 30: CPT | Performed by: NURSE PRACTITIONER

## 2025-03-04 ASSESSMENT — ENCOUNTER SYMPTOMS
DIFFICULTY URINATING: 0
PALPITATIONS: 0
COUGH: 0
ABDOMINAL PAIN: 0
FEVER: 0
CHILLS: 0
FATIGUE: 0
DIARRHEA: 0
NAUSEA: 0
VOMITING: 0
SHORTNESS OF BREATH: 0
CONSTIPATION: 0

## 2025-03-04 NOTE — LETTER
Patient: Nato Downs  : 1970    Encounter Date: 2025      Subjective  Nato Downs is a 54 y.o. male here for routine visit.   HPI   There are no new problems and multiple health problems have been reviewed.  The course has been unchanged.  The patient  is moderately confused.   There are no family members present during time of visit.    Grzegorz is is sitting up in chair,, denies any complaints when asked.   No known recent seizures.  Eating and drinking well.   No concerns per staff.   He has had an occasional episode of throwing things in room such as paper, water pitcher and has removed his ostomy pouch.  Per staff this happens every few weeks, no acute changes.           Review of Systems   Constitutional:  Negative for chills, fatigue and fever.   Respiratory:  Negative for cough and shortness of breath.    Cardiovascular:  Negative for chest pain and palpitations.   Gastrointestinal:  Negative for abdominal pain, constipation, diarrhea, nausea and vomiting.   Genitourinary:  Negative for difficulty urinating.       Objective  /70   Pulse 82   Temp 36.6 °C (97.8 °F)   Resp 18   Wt 133 kg (293 lb)   SpO2 96%   BMI 35.67 kg/m²     Physical Exam  Constitutional:       General: He is not in acute distress.     Appearance: He is obese.   HENT:      Head: Normocephalic and atraumatic.   Eyes:      Conjunctiva/sclera: Conjunctivae normal.   Cardiovascular:      Rate and Rhythm: Normal rate and regular rhythm.   Pulmonary:      Effort: Pulmonary effort is normal. No respiratory distress.      Breath sounds: Normal breath sounds.   Abdominal:      General: Bowel sounds are normal. There is no distension.      Palpations: Abdomen is soft.      Tenderness: There is no abdominal tenderness.      Comments: Ostomy draining semi formed brown.   Abdominal incisions healed.    Genitourinary:     Comments: Suprapubic cath draining clear yellow  Musculoskeletal:         General: Normal range of  motion.      Right lower leg: No edema.      Left lower leg: No edema.   Skin:     General: Skin is warm and dry.   Neurological:      General: No focal deficit present.      Mental Status: He is alert. Mental status is at baseline.   Psychiatric:         Mood and Affect: Mood normal.         Behavior: Behavior normal.         Assessment/Plan  Problem List Items Addressed This Visit       Neurogenic bladder     Suprapubic cath.  Functioning well         Neurogenic bowel     Ostomy in place.  Functioning well per staff.          Seizure disorder (Multi)     No known recent seizures.    Staff to monitor and notify for any recurrence of seizure activity           Moderate dementia without behavioral disturbance, psychotic disturbance, mood disturbance, or anxiety, unspecified dementia type - Primary     He has an occasional episode of behavior where he will throw a paper, water pitcher or remove his ostomy pouch.  Per staff occurs every few weeks but no acute changes in behavior.  He is sitting up in chair, states he is feeling well.  Will consider adjustment of meds if epsisodes increase.         labs/meds/orders reviewed  staff to monitor and notify for any changes.  continue with therapy as able.  Next full labs 6/25 and prn          Electronically Signed By: CHARLA Larios-CNP   3/4/25 11:29 AM

## 2025-03-04 NOTE — PROGRESS NOTES
Subjective   Nato Downs is a 54 y.o. male here for routine visit.   HPI   There are no new problems and multiple health problems have been reviewed.  The course has been unchanged.  The patient  is moderately confused.   There are no family members present during time of visit.    Grzegorz is is sitting up in chair,, denies any complaints when asked.   No known recent seizures.  Eating and drinking well.   No concerns per staff.   He has had an occasional episode of throwing things in room such as paper, water pitcher and has removed his ostomy pouch.  Per staff this happens every few weeks, no acute changes.           Review of Systems   Constitutional:  Negative for chills, fatigue and fever.   Respiratory:  Negative for cough and shortness of breath.    Cardiovascular:  Negative for chest pain and palpitations.   Gastrointestinal:  Negative for abdominal pain, constipation, diarrhea, nausea and vomiting.   Genitourinary:  Negative for difficulty urinating.       Objective   /70   Pulse 82   Temp 36.6 °C (97.8 °F)   Resp 18   Wt 133 kg (293 lb)   SpO2 96%   BMI 35.67 kg/m²     Physical Exam  Constitutional:       General: He is not in acute distress.     Appearance: He is obese.   HENT:      Head: Normocephalic and atraumatic.   Eyes:      Conjunctiva/sclera: Conjunctivae normal.   Cardiovascular:      Rate and Rhythm: Normal rate and regular rhythm.   Pulmonary:      Effort: Pulmonary effort is normal. No respiratory distress.      Breath sounds: Normal breath sounds.   Abdominal:      General: Bowel sounds are normal. There is no distension.      Palpations: Abdomen is soft.      Tenderness: There is no abdominal tenderness.      Comments: Ostomy draining semi formed brown.   Abdominal incisions healed.    Genitourinary:     Comments: Suprapubic cath draining clear yellow  Musculoskeletal:         General: Normal range of motion.      Right lower leg: No edema.      Left lower leg: No edema.  Normal-cpm, recheck per protocol   Skin:     General: Skin is warm and dry.   Neurological:      General: No focal deficit present.      Mental Status: He is alert. Mental status is at baseline.   Psychiatric:         Mood and Affect: Mood normal.         Behavior: Behavior normal.         Assessment/Plan   Problem List Items Addressed This Visit       Neurogenic bladder     Suprapubic cath.  Functioning well         Neurogenic bowel     Ostomy in place.  Functioning well per staff.          Seizure disorder (Multi)     No known recent seizures.    Staff to monitor and notify for any recurrence of seizure activity           Moderate dementia without behavioral disturbance, psychotic disturbance, mood disturbance, or anxiety, unspecified dementia type - Primary     He has an occasional episode of behavior where he will throw a paper, water pitcher or remove his ostomy pouch.  Per staff occurs every few weeks but no acute changes in behavior.  He is sitting up in chair, states he is feeling well.  Will consider adjustment of meds if epsisodes increase.         labs/meds/orders reviewed  staff to monitor and notify for any changes.  continue with therapy as able.  Next full labs 6/25 and prn

## 2025-03-04 NOTE — ASSESSMENT & PLAN NOTE
He has an occasional episode of behavior where he will throw a paper, water pitcher or remove his ostomy pouch.  Per staff occurs every few weeks but no acute changes in behavior.  He is sitting up in chair, states he is feeling well.  Will consider adjustment of meds if epsisodes increase.

## 2025-03-11 DIAGNOSIS — F41.9 ANXIETY: ICD-10-CM

## 2025-03-11 RX ORDER — CLONAZEPAM 0.5 MG/1
0.5 TABLET ORAL 2 TIMES DAILY
Qty: 60 TABLET | Refills: 5 | Status: SHIPPED | OUTPATIENT
Start: 2025-03-11

## 2025-03-21 ENCOUNTER — NURSING HOME VISIT (OUTPATIENT)
Dept: POST ACUTE CARE | Facility: EXTERNAL LOCATION | Age: 55
End: 2025-03-21
Payer: MEDICARE

## 2025-03-21 VITALS
RESPIRATION RATE: 18 BRPM | WEIGHT: 293 LBS | SYSTOLIC BLOOD PRESSURE: 122 MMHG | HEART RATE: 86 BPM | BODY MASS INDEX: 35.67 KG/M2 | DIASTOLIC BLOOD PRESSURE: 76 MMHG | OXYGEN SATURATION: 97 %

## 2025-03-21 DIAGNOSIS — R53.81 PHYSICAL DEBILITY: ICD-10-CM

## 2025-03-21 DIAGNOSIS — E66.9 OBESITY, UNSPECIFIED CLASS, UNSPECIFIED OBESITY TYPE, UNSPECIFIED WHETHER SERIOUS COMORBIDITY PRESENT: ICD-10-CM

## 2025-03-21 DIAGNOSIS — Q07.9 CONGENITAL ENCEPHALOPATHY (MULTI): ICD-10-CM

## 2025-03-21 DIAGNOSIS — G40.909 SEIZURE DISORDER (MULTI): Primary | ICD-10-CM

## 2025-03-21 DIAGNOSIS — F03.B0 MODERATE DEMENTIA WITHOUT BEHAVIORAL DISTURBANCE, PSYCHOTIC DISTURBANCE, MOOD DISTURBANCE, OR ANXIETY, UNSPECIFIED DEMENTIA TYPE: ICD-10-CM

## 2025-03-21 PROCEDURE — 99308 SBSQ NF CARE LOW MDM 20: CPT | Performed by: INTERNAL MEDICINE

## 2025-03-21 NOTE — LETTER
Patient: Nato Downs  : 1970    Encounter Date: 2025    Subjective  Patient ID: Nato Downs is a 54 y.o. male who is long term resident being seen and evaluated for multiple medical problems.    HPI   This 54-year-old male patient is resting quietly in his room in no distress.  He has no complaints of pain or shortness of breath me.  Nursing has no new adverse events department.    Current high risk medication:  Klonopin  Diastat  Ditropan  Keppra  Risperidone  Trileptal    Laboratory examination from 2024 are reviewed and include:  CMP unremarkable except for albumin 3.4  White blood cell 11.1  Hemoglobin 11.5  Keppra 17  Trileptal 19  Vitamin D 51    Review of Systems   Constitutional:  Negative for chills, fatigue and fever.   Respiratory:  Negative for cough and shortness of breath.    Cardiovascular:  Negative for chest pain and palpitations.   Gastrointestinal:  Negative for abdominal pain, constipation, diarrhea, nausea and vomiting.   Genitourinary:  Negative for difficulty urinating.       Objective  /76   Pulse 86   Resp 18   Wt 133 kg (293 lb)   SpO2 97%   BMI 35.67 kg/m²     Physical Exam  Constitutional:       General: He is not in acute distress.     Appearance: He is obese.   HENT:      Head: Normocephalic and atraumatic.   Eyes:      Conjunctiva/sclera: Conjunctivae normal.   Cardiovascular:      Rate and Rhythm: Normal rate and regular rhythm.   Pulmonary:      Effort: Pulmonary effort is normal. No respiratory distress.      Breath sounds: Normal breath sounds.   Abdominal:      General: Bowel sounds are normal. There is no distension.      Palpations: Abdomen is soft.      Tenderness: There is no abdominal tenderness.      Comments: Ostomy draining semi formed brown.   Abdominal incisions healed.    Genitourinary:     Comments: Suprapubic cath draining clear yellow  Musculoskeletal:         General: Normal range of motion.      Right lower leg: No edema.       Left lower leg: No edema.   Skin:     General: Skin is warm and dry.   Neurological:      General: No focal deficit present.      Mental Status: He is alert. Mental status is at baseline.   Psychiatric:         Mood and Affect: Mood normal.         Behavior: Behavior normal.         Assessment/Plan  Problem List Items Addressed This Visit             ICD-10-CM    Obesity E66.9    Physical debility R53.81    Congenital encephalopathy (Multi) Q07.9    Seizure disorder (Multi) - Primary G40.909    Moderate dementia without behavioral disturbance, psychotic disturbance, mood disturbance, or anxiety, unspecified dementia type F03.B0       A.  We will continue with restorative and supportive care as the patient tolerates    B.  Laboratory examinations will next be due in May 2025 or as needed to include hemoglobin A1c due to insulin resistance and hyperglycemia    C.  Patient's prognosis is guarded.      Electronically Signed By: Amilcar Quezada MD   4/7/25  2:38 PM

## 2025-03-21 NOTE — PROGRESS NOTES
Subjective   Patient ID: Nato Downs is a 54 y.o. male who is long term resident being seen and evaluated for multiple medical problems.    HPI   This 54-year-old male patient is resting quietly in his room in no distress.  He has no complaints of pain or shortness of breath me.  Nursing has no new adverse events department.    Current high risk medication:  Klonopin  Diastat  Ditropan  Keppra  Risperidone  Trileptal    Laboratory examination from December 2024 are reviewed and include:  CMP unremarkable except for albumin 3.4  White blood cell 11.1  Hemoglobin 11.5  Keppra 17  Trileptal 19  Vitamin D 51    Review of Systems   Constitutional:  Negative for chills, fatigue and fever.   Respiratory:  Negative for cough and shortness of breath.    Cardiovascular:  Negative for chest pain and palpitations.   Gastrointestinal:  Negative for abdominal pain, constipation, diarrhea, nausea and vomiting.   Genitourinary:  Negative for difficulty urinating.       Objective   /76   Pulse 86   Resp 18   Wt 133 kg (293 lb)   SpO2 97%   BMI 35.67 kg/m²     Physical Exam  Constitutional:       General: He is not in acute distress.     Appearance: He is obese.   HENT:      Head: Normocephalic and atraumatic.   Eyes:      Conjunctiva/sclera: Conjunctivae normal.   Cardiovascular:      Rate and Rhythm: Normal rate and regular rhythm.   Pulmonary:      Effort: Pulmonary effort is normal. No respiratory distress.      Breath sounds: Normal breath sounds.   Abdominal:      General: Bowel sounds are normal. There is no distension.      Palpations: Abdomen is soft.      Tenderness: There is no abdominal tenderness.      Comments: Ostomy draining semi formed brown.   Abdominal incisions healed.    Genitourinary:     Comments: Suprapubic cath draining clear yellow  Musculoskeletal:         General: Normal range of motion.      Right lower leg: No edema.      Left lower leg: No edema.   Skin:     General: Skin is warm and dry.    Neurological:      General: No focal deficit present.      Mental Status: He is alert. Mental status is at baseline.   Psychiatric:         Mood and Affect: Mood normal.         Behavior: Behavior normal.         Assessment/Plan   Problem List Items Addressed This Visit             ICD-10-CM    Obesity E66.9    Physical debility R53.81    Congenital encephalopathy (Multi) Q07.9    Seizure disorder (Multi) - Primary G40.909    Moderate dementia without behavioral disturbance, psychotic disturbance, mood disturbance, or anxiety, unspecified dementia type F03.B0       A.  We will continue with restorative and supportive care as the patient tolerates    B.  Laboratory examinations will next be due in May 2025 or as needed to include hemoglobin A1c due to insulin resistance and hyperglycemia    C.  Patient's prognosis is guarded.

## 2025-04-07 ASSESSMENT — ENCOUNTER SYMPTOMS
COUGH: 0
CONSTIPATION: 0
DIFFICULTY URINATING: 0
CHILLS: 0
PALPITATIONS: 0
NAUSEA: 0
SHORTNESS OF BREATH: 0
ABDOMINAL PAIN: 0
FEVER: 0
DIARRHEA: 0
FATIGUE: 0
VOMITING: 0

## 2025-04-18 ENCOUNTER — NURSING HOME VISIT (OUTPATIENT)
Dept: POST ACUTE CARE | Facility: EXTERNAL LOCATION | Age: 55
End: 2025-04-18
Payer: MEDICARE

## 2025-04-18 DIAGNOSIS — Q07.9 CONGENITAL ENCEPHALOPATHY (MULTI): ICD-10-CM

## 2025-04-18 DIAGNOSIS — G40.909 SEIZURE DISORDER (MULTI): Primary | ICD-10-CM

## 2025-04-18 DIAGNOSIS — R53.81 PHYSICAL DEBILITY: ICD-10-CM

## 2025-04-18 DIAGNOSIS — F03.B0 MODERATE DEMENTIA WITHOUT BEHAVIORAL DISTURBANCE, PSYCHOTIC DISTURBANCE, MOOD DISTURBANCE, OR ANXIETY, UNSPECIFIED DEMENTIA TYPE: ICD-10-CM

## 2025-04-18 PROCEDURE — 99308 SBSQ NF CARE LOW MDM 20: CPT | Performed by: INTERNAL MEDICINE

## 2025-04-18 NOTE — LETTER
Patient: Nato Downs  : 1970    Encounter Date: 2025    Subjective  Patient ID: Nato Downs is a 54 y.o. male who is long term resident being seen and evaluated for multiple medical problems.    HPI   54-year-old male patient is resting comfortably in his room in no distress.  He has no complaints me today.  He is in good spirits.  Nursing has no new adverse events reported to me.    Current high risk medication:  Clonazepam  Diastat  Ditropan  Keppra  Risperidone  Trileptal    Laboratory examination from 2024 reviewed in detail by me.    Review of Systems   Constitutional:  Negative for chills, fatigue and fever.   Respiratory:  Negative for cough and shortness of breath.    Cardiovascular:  Negative for chest pain and palpitations.   Gastrointestinal:  Negative for abdominal pain, constipation, diarrhea, nausea and vomiting.   Genitourinary:  Negative for difficulty urinating.       Objective  /78   Pulse 82   Resp 18   Wt 133 kg (294 lb)   SpO2 98%   BMI 35.79 kg/m²     Physical Exam  Constitutional:       General: He is not in acute distress.     Appearance: He is obese.   HENT:      Head: Normocephalic and atraumatic.   Eyes:      Conjunctiva/sclera: Conjunctivae normal.   Cardiovascular:      Rate and Rhythm: Normal rate and regular rhythm.   Pulmonary:      Effort: Pulmonary effort is normal. No respiratory distress.      Breath sounds: Normal breath sounds.   Abdominal:      General: Bowel sounds are normal. There is no distension.      Palpations: Abdomen is soft.      Tenderness: There is no abdominal tenderness.      Comments: Ostomy draining semi formed brown.   Abdominal incisions healed.    Genitourinary:     Comments: Suprapubic cath draining clear yellow  Musculoskeletal:         General: Normal range of motion.      Right lower leg: No edema.      Left lower leg: No edema.   Skin:     General: Skin is warm and dry.   Neurological:      General: No focal  deficit present.      Mental Status: He is alert. Mental status is at baseline.   Psychiatric:         Mood and Affect: Mood normal.         Behavior: Behavior normal.         Assessment/Plan  Problem List Items Addressed This Visit           ICD-10-CM    Physical debility R53.81    Congenital encephalopathy (Multi) Q07.9    Seizure disorder (Multi) - Primary G40.909    Moderate dementia without behavioral disturbance, psychotic disturbance, mood disturbance, or anxiety, unspecified dementia type F03.B0       A.  Will continue with restorative and supportive care as the patient tolerates    B.  Laboratory examinations will continue to be monitored and should next be due in June 2025 or as needed    C.  The patient's prognosis is guarded      Electronically Signed By: Amilcar Quezada MD   4/28/25  3:40 PM

## 2025-04-19 VITALS
BODY MASS INDEX: 35.79 KG/M2 | WEIGHT: 294 LBS | OXYGEN SATURATION: 98 % | RESPIRATION RATE: 18 BRPM | SYSTOLIC BLOOD PRESSURE: 128 MMHG | DIASTOLIC BLOOD PRESSURE: 78 MMHG | HEART RATE: 82 BPM

## 2025-04-19 NOTE — PROGRESS NOTES
Subjective   Patient ID: Nato Downs is a 54 y.o. male who is long term resident being seen and evaluated for multiple medical problems.    HPI   54-year-old male patient is resting comfortably in his room in no distress.  He has no complaints me today.  He is in good spirits.  Nursing has no new adverse events reported to me.    Current high risk medication:  Clonazepam  Diastat  Ditropan  Keppra  Risperidone  Trileptal    Laboratory examination from December 2024 reviewed in detail by me.    Review of Systems   Constitutional:  Negative for chills, fatigue and fever.   Respiratory:  Negative for cough and shortness of breath.    Cardiovascular:  Negative for chest pain and palpitations.   Gastrointestinal:  Negative for abdominal pain, constipation, diarrhea, nausea and vomiting.   Genitourinary:  Negative for difficulty urinating.       Objective   /78   Pulse 82   Resp 18   Wt 133 kg (294 lb)   SpO2 98%   BMI 35.79 kg/m²     Physical Exam  Constitutional:       General: He is not in acute distress.     Appearance: He is obese.   HENT:      Head: Normocephalic and atraumatic.   Eyes:      Conjunctiva/sclera: Conjunctivae normal.   Cardiovascular:      Rate and Rhythm: Normal rate and regular rhythm.   Pulmonary:      Effort: Pulmonary effort is normal. No respiratory distress.      Breath sounds: Normal breath sounds.   Abdominal:      General: Bowel sounds are normal. There is no distension.      Palpations: Abdomen is soft.      Tenderness: There is no abdominal tenderness.      Comments: Ostomy draining semi formed brown.   Abdominal incisions healed.    Genitourinary:     Comments: Suprapubic cath draining clear yellow  Musculoskeletal:         General: Normal range of motion.      Right lower leg: No edema.      Left lower leg: No edema.   Skin:     General: Skin is warm and dry.   Neurological:      General: No focal deficit present.      Mental Status: He is alert. Mental status is at  baseline.   Psychiatric:         Mood and Affect: Mood normal.         Behavior: Behavior normal.         Assessment/Plan   Problem List Items Addressed This Visit           ICD-10-CM    Physical debility R53.81    Congenital encephalopathy (Multi) Q07.9    Seizure disorder (Multi) - Primary G40.909    Moderate dementia without behavioral disturbance, psychotic disturbance, mood disturbance, or anxiety, unspecified dementia type F03.B0       A.  Will continue with restorative and supportive care as the patient tolerates    B.  Laboratory examinations will continue to be monitored and should next be due in June 2025 or as needed    C.  The patient's prognosis is guarded

## 2025-04-28 ASSESSMENT — ENCOUNTER SYMPTOMS
NAUSEA: 0
FATIGUE: 0
DIARRHEA: 0
COUGH: 0
FEVER: 0
SHORTNESS OF BREATH: 0
PALPITATIONS: 0
DIFFICULTY URINATING: 0
CHILLS: 0
CONSTIPATION: 0
VOMITING: 0
ABDOMINAL PAIN: 0

## 2025-05-06 ENCOUNTER — NURSING HOME VISIT (OUTPATIENT)
Dept: POST ACUTE CARE | Facility: EXTERNAL LOCATION | Age: 55
End: 2025-05-06
Payer: MEDICARE

## 2025-05-06 VITALS
BODY MASS INDEX: 35.91 KG/M2 | RESPIRATION RATE: 18 BRPM | TEMPERATURE: 98.1 F | SYSTOLIC BLOOD PRESSURE: 132 MMHG | WEIGHT: 295 LBS | HEART RATE: 82 BPM | DIASTOLIC BLOOD PRESSURE: 72 MMHG | OXYGEN SATURATION: 98 %

## 2025-05-06 DIAGNOSIS — N31.9 NEUROGENIC BLADDER: ICD-10-CM

## 2025-05-06 DIAGNOSIS — G40.909 SEIZURE DISORDER (MULTI): Primary | ICD-10-CM

## 2025-05-06 DIAGNOSIS — R53.81 PHYSICAL DEBILITY: ICD-10-CM

## 2025-05-06 DIAGNOSIS — F03.B0 MODERATE DEMENTIA WITHOUT BEHAVIORAL DISTURBANCE, PSYCHOTIC DISTURBANCE, MOOD DISTURBANCE, OR ANXIETY, UNSPECIFIED DEMENTIA TYPE: ICD-10-CM

## 2025-05-06 DIAGNOSIS — K59.2 NEUROGENIC BOWEL: ICD-10-CM

## 2025-05-06 PROCEDURE — 99309 SBSQ NF CARE MODERATE MDM 30: CPT | Performed by: NURSE PRACTITIONER

## 2025-05-06 NOTE — LETTER
Patient: Nato Downs  : 1970    Encounter Date: 2025      Subjective  Nato Downs is a 54 y.o. male here for routine visit.   HPI   There are no new problems and multiple health problems have been reviewed.  The course has been unchanged.  The patient  is moderately confused.   There are no family members present during time of visit.    Grzegorz dougherty is sitting up in chair,, denies any complaints when asked.   No known recent seizures.  Eating and drinking well.   No concerns per staff.    No recent behaviors per staff.           Review of Systems   Constitutional:  Negative for chills, fatigue and fever.   Respiratory:  Negative for cough and shortness of breath.    Cardiovascular:  Negative for chest pain and palpitations.   Gastrointestinal:  Negative for abdominal pain, constipation, diarrhea, nausea and vomiting.   Genitourinary:  Negative for difficulty urinating.       Objective  /72   Pulse 82   Temp 36.7 °C (98.1 °F)   Resp 18   Wt 134 kg (295 lb)   SpO2 98%   BMI 35.91 kg/m²     Physical Exam  Constitutional:       General: He is not in acute distress.     Appearance: He is obese.   HENT:      Head: Normocephalic and atraumatic.   Eyes:      Conjunctiva/sclera: Conjunctivae normal.   Cardiovascular:      Rate and Rhythm: Normal rate and regular rhythm.   Pulmonary:      Effort: Pulmonary effort is normal. No respiratory distress.      Breath sounds: Normal breath sounds.   Abdominal:      General: Bowel sounds are normal. There is no distension.      Palpations: Abdomen is soft.      Tenderness: There is no abdominal tenderness.      Comments: Ostomy draining semi formed brown.   Abdominal incisions healed.    Genitourinary:     Comments: Suprapubic cath draining clear yellow  Musculoskeletal:         General: Normal range of motion.      Right lower leg: No edema.      Left lower leg: No edema.   Skin:     General: Skin is warm and dry.   Neurological:      General: No focal  deficit present.      Mental Status: He is alert. Mental status is at baseline.   Psychiatric:         Mood and Affect: Mood normal.         Behavior: Behavior normal.         Assessment/Plan  Problem List Items Addressed This Visit       Neurogenic bladder    Suprapubic cath.  Functioning well         Neurogenic bowel    Ostomy in place.  Functioning well per staff.          Physical debility    He is ambulating better around the facility.  staff to monitor and notify for any changes.             Seizure disorder (Multi) - Primary    No known recent seizures.    Staff to monitor and notify for any recurrence of seizure activity           Moderate dementia without behavioral disturbance, psychotic disturbance, mood disturbance, or anxiety, unspecified dementia type      He is sitting up in chair, states he is feeling well.  No recent behaviors per staff.         labs/meds/orders reviewed  staff to monitor and notify for any changes.  continue with supportive and restorative care  Next full labs 6/25 and prn          Electronically Signed By: CHARLA Larios-CNP   5/6/25  2:45 PM

## 2025-05-06 NOTE — PROGRESS NOTES
Subjective   Nato Downs is a 54 y.o. male here for routine visit.   HPI   There are no new problems and multiple health problems have been reviewed.  The course has been unchanged.  The patient  is moderately confused.   There are no family members present during time of visit.    Grzegorz dougherty is sitting up in chair,, denies any complaints when asked.   No known recent seizures.  Eating and drinking well.   No concerns per staff.    No recent behaviors per staff.           Review of Systems   Constitutional:  Negative for chills, fatigue and fever.   Respiratory:  Negative for cough and shortness of breath.    Cardiovascular:  Negative for chest pain and palpitations.   Gastrointestinal:  Negative for abdominal pain, constipation, diarrhea, nausea and vomiting.   Genitourinary:  Negative for difficulty urinating.       Objective   /72   Pulse 82   Temp 36.7 °C (98.1 °F)   Resp 18   Wt 134 kg (295 lb)   SpO2 98%   BMI 35.91 kg/m²     Physical Exam  Constitutional:       General: He is not in acute distress.     Appearance: He is obese.   HENT:      Head: Normocephalic and atraumatic.   Eyes:      Conjunctiva/sclera: Conjunctivae normal.   Cardiovascular:      Rate and Rhythm: Normal rate and regular rhythm.   Pulmonary:      Effort: Pulmonary effort is normal. No respiratory distress.      Breath sounds: Normal breath sounds.   Abdominal:      General: Bowel sounds are normal. There is no distension.      Palpations: Abdomen is soft.      Tenderness: There is no abdominal tenderness.      Comments: Ostomy draining semi formed brown.   Abdominal incisions healed.    Genitourinary:     Comments: Suprapubic cath draining clear yellow  Musculoskeletal:         General: Normal range of motion.      Right lower leg: No edema.      Left lower leg: No edema.   Skin:     General: Skin is warm and dry.   Neurological:      General: No focal deficit present.      Mental Status: He is alert. Mental status is at  baseline.   Psychiatric:         Mood and Affect: Mood normal.         Behavior: Behavior normal.         Assessment/Plan   Problem List Items Addressed This Visit       Neurogenic bladder    Suprapubic cath.  Functioning well         Neurogenic bowel    Ostomy in place.  Functioning well per staff.          Physical debility    He is ambulating better around the facility.  staff to monitor and notify for any changes.             Seizure disorder (Multi) - Primary    No known recent seizures.    Staff to monitor and notify for any recurrence of seizure activity           Moderate dementia without behavioral disturbance, psychotic disturbance, mood disturbance, or anxiety, unspecified dementia type      He is sitting up in chair, states he is feeling well.  No recent behaviors per staff.         labs/meds/orders reviewed  staff to monitor and notify for any changes.  continue with supportive and restorative care  Next full labs 6/25 and prn

## 2025-05-07 ASSESSMENT — ENCOUNTER SYMPTOMS
VOMITING: 0
CONSTIPATION: 0
DIARRHEA: 0
ABDOMINAL PAIN: 0
DIFFICULTY URINATING: 0
PALPITATIONS: 0
COUGH: 0
FEVER: 0
SHORTNESS OF BREATH: 0
FATIGUE: 0
NAUSEA: 0
CHILLS: 0

## 2025-06-27 ENCOUNTER — NURSING HOME VISIT (OUTPATIENT)
Dept: POST ACUTE CARE | Facility: EXTERNAL LOCATION | Age: 55
End: 2025-06-27
Payer: MEDICARE

## 2025-06-27 DIAGNOSIS — F03.B0 MODERATE DEMENTIA WITHOUT BEHAVIORAL DISTURBANCE, PSYCHOTIC DISTURBANCE, MOOD DISTURBANCE, OR ANXIETY, UNSPECIFIED DEMENTIA TYPE: ICD-10-CM

## 2025-06-27 DIAGNOSIS — K59.2 NEUROGENIC BOWEL: ICD-10-CM

## 2025-06-27 DIAGNOSIS — Z93.59 SUPRAPUBIC CATHETER (MULTI): ICD-10-CM

## 2025-06-27 DIAGNOSIS — G40.909 SEIZURE DISORDER (MULTI): Primary | ICD-10-CM

## 2025-06-27 DIAGNOSIS — N31.9 NEUROGENIC BLADDER: ICD-10-CM

## 2025-06-27 DIAGNOSIS — E55.9 VITAMIN D DEFICIENCY: ICD-10-CM

## 2025-06-27 PROCEDURE — 99308 SBSQ NF CARE LOW MDM 20: CPT | Performed by: INTERNAL MEDICINE

## 2025-06-27 NOTE — PROGRESS NOTES
Patient is seen at [facility] today.  He is in usual state of health.  He denies any fever chill any other constitutional symptoms.  Has no headache or visual disturbances.  Denies any chest pain or palpitation.  Has no shortness of breath or wheezing.  Denies any nausea vomiting pain abdomen.  He has no problem with his urinary catheter.  Denies any new onset weakness of any extremity.  He has no recent episode of seizure.  Denies any joint pain.  Review of other systems are negative.    Past medical history:  Seizure disorder  Neurogenic bladder  Neurogenic bowel  Coronary artery disease  Dementia  Mood disorder  Vitamin D deficiency  Anxiety  Urinary bladder stones  Metabolic disorder  Gastroesophageal reflux disease  Sensorineural hearing deficit    On examination: General examination: Comfortable  Vital signs: Heart rate is 80/min, respiratory rate is 18,  Eyes: There is no pallor or jaundice pupils are equal and reactive to light  Oral cavity throat normal  Neck: There is no JVD or carotid bruit  Lungs: Breath sounds are normal  CVS: Rhythm is regular there is no murmur  Abdomen: There is no belly tenderness  CNS: Awake alert: There is no focal deficit  Musculoskeletal system there is no joint swelling  Legs: No edema  Mood: Normal    Assessment and plan:  1.  History of seizure disorder: Continue Keppra and Trileptal  2.  Neurogenic bladder: Patient has chronic suprapubic catheter  3.  History of neurogenic bowel disease: Patient has colostomy  4.  Vitamin D deficiency: Continue with the current supplements  5.  Gastroesophageal reflux disease: Symptoms are controlled on omeprazole  6.  Cognitive impairment: Continue supportive care    1.

## 2025-06-27 NOTE — LETTER
Patient: Nato Downs  : 1970    Encounter Date: 2025    Patient is seen at [facility] today.  He is in usual state of health.  He denies any fever chill any other constitutional symptoms.  Has no headache or visual disturbances.  Denies any chest pain or palpitation.  Has no shortness of breath or wheezing.  Denies any nausea vomiting pain abdomen.  He has no problem with his urinary catheter.  Denies any new onset weakness of any extremity.  He has no recent episode of seizure.  Denies any joint pain.  Review of other systems are negative.    Past medical history:  Seizure disorder  Neurogenic bladder  Neurogenic bowel  Coronary artery disease  Dementia  Mood disorder  Vitamin D deficiency  Anxiety  Urinary bladder stones  Metabolic disorder  Gastroesophageal reflux disease  Sensorineural hearing deficit    On examination: General examination: Comfortable  Vital signs: Heart rate is 80/min, respiratory rate is 18,  Eyes: There is no pallor or jaundice pupils are equal and reactive to light  Oral cavity throat normal  Neck: There is no JVD or carotid bruit  Lungs: Breath sounds are normal  CVS: Rhythm is regular there is no murmur  Abdomen: There is no belly tenderness  CNS: Awake alert: There is no focal deficit  Musculoskeletal system there is no joint swelling  Legs: No edema  Mood: Normal    Assessment and plan:  1.  History of seizure disorder: Continue Keppra and Trileptal  2.  Neurogenic bladder: Patient has chronic suprapubic catheter  3.  History of neurogenic bowel disease: Patient has colostomy  4.  Vitamin D deficiency: Continue with the current supplements  5.  Gastroesophageal reflux disease: Symptoms are controlled on omeprazole  6.  Cognitive impairment: Continue supportive care    1.    Electronically Signed By: Caroline Anderson MD   25  7:53 PM

## 2025-07-17 ENCOUNTER — NURSING HOME VISIT (OUTPATIENT)
Dept: POST ACUTE CARE | Facility: EXTERNAL LOCATION | Age: 55
End: 2025-07-17
Payer: MEDICARE

## 2025-07-17 VITALS
RESPIRATION RATE: 20 BRPM | OXYGEN SATURATION: 96 % | WEIGHT: 301 LBS | BODY MASS INDEX: 36.64 KG/M2 | SYSTOLIC BLOOD PRESSURE: 132 MMHG | HEART RATE: 82 BPM | DIASTOLIC BLOOD PRESSURE: 84 MMHG | TEMPERATURE: 97.6 F

## 2025-07-17 DIAGNOSIS — F03.B0 MODERATE DEMENTIA WITHOUT BEHAVIORAL DISTURBANCE, PSYCHOTIC DISTURBANCE, MOOD DISTURBANCE, OR ANXIETY, UNSPECIFIED DEMENTIA TYPE: ICD-10-CM

## 2025-07-17 DIAGNOSIS — Z93.59 SUPRAPUBIC CATHETER (MULTI): ICD-10-CM

## 2025-07-17 DIAGNOSIS — E55.9 VITAMIN D DEFICIENCY: ICD-10-CM

## 2025-07-17 DIAGNOSIS — R53.81 PHYSICAL DEBILITY: ICD-10-CM

## 2025-07-17 DIAGNOSIS — Z93.2 ILEOSTOMY IN PLACE (MULTI): Primary | ICD-10-CM

## 2025-07-17 DIAGNOSIS — G40.909 SEIZURE DISORDER (MULTI): ICD-10-CM

## 2025-07-17 PROCEDURE — 99309 SBSQ NF CARE MODERATE MDM 30: CPT | Performed by: NURSE PRACTITIONER

## 2025-07-17 ASSESSMENT — ENCOUNTER SYMPTOMS
DIFFICULTY URINATING: 0
FEVER: 0
DIARRHEA: 0
CHILLS: 0
PALPITATIONS: 0
NAUSEA: 0
SHORTNESS OF BREATH: 0
CONSTIPATION: 0
COUGH: 0
FATIGUE: 0
VOMITING: 0
ABDOMINAL PAIN: 0

## 2025-07-17 NOTE — LETTER
Patient: Nato Downs  : 1970    Encounter Date: 2025      Subjective  Nato Downs is a 54 y.o. male here for routine visit and annual H&P   HPI   There are no new problems and multiple health problems have been reviewed.   No significant hospitalization in the last 1 year, had cholecystectomy 1 year ago.  The course has been unchanged.  The patient  is moderately confused.   There are no family members present during time of visit.    Grzegorz is is sitting up in chair,, denies any complaints when asked.   No known recent seizures.  Eating and drinking well.   No concerns per staff.    No recent behaviors per staff.           Review of Systems   Constitutional:  Negative for chills, fatigue and fever.   Respiratory:  Negative for cough and shortness of breath.    Cardiovascular:  Negative for chest pain and palpitations.   Gastrointestinal:  Negative for abdominal pain, constipation, diarrhea, nausea and vomiting.   Genitourinary:  Negative for difficulty urinating.       Objective  /84   Pulse 82   Temp 36.4 °C (97.6 °F)   Resp 20   Wt 137 kg (301 lb)   SpO2 96%   BMI 36.64 kg/m²     Physical Exam  Constitutional:       General: He is not in acute distress.     Appearance: He is obese.   HENT:      Head: Normocephalic and atraumatic.     Eyes:      Conjunctiva/sclera: Conjunctivae normal.       Cardiovascular:      Rate and Rhythm: Normal rate and regular rhythm.   Pulmonary:      Effort: Pulmonary effort is normal. No respiratory distress.      Breath sounds: Normal breath sounds.   Abdominal:      General: Bowel sounds are normal. There is no distension.      Palpations: Abdomen is soft.      Tenderness: There is no abdominal tenderness.      Comments: Ostomy draining semi formed brown.   Abdominal incisions healed.    Genitourinary:     Comments: Suprapubic cath draining clear yellow    Musculoskeletal:         General: Normal range of motion.      Right lower leg: No edema.       Left lower leg: No edema.     Skin:     General: Skin is warm and dry.     Neurological:      General: No focal deficit present.      Mental Status: He is alert. Mental status is at baseline.     Psychiatric:         Mood and Affect: Mood normal.         Behavior: Behavior normal.         Assessment/Plan  Problem List Items Addressed This Visit       Ileostomy in place (Multi) - Primary    Semi formed brown.           Physical debility    He is ambulating better around the facility.  staff to monitor and notify for any changes.             Seizure disorder (Multi)    No known recent seizures.    Staff to monitor and notify for any recurrence of seizure activity           Suprapubic catheter (Multi)    Functioning well         Vitamin D deficiency    monitor with routine labs.           Moderate dementia without behavioral disturbance, psychotic disturbance, mood disturbance, or anxiety, unspecified dementia type      He is sitting up in chair, states he is feeling well.  No recent behaviors per staff.         labs/meds/orders reviewed  staff to monitor and notify for any changes.  continue with supportive and restorative care  Due for labs, ordered.  A1C was completed 6/25          Electronically Signed By: NASREEN Larios   7/17/25 10:28 AM

## 2025-07-17 NOTE — PROGRESS NOTES
Subjective   Nato Downs is a 54 y.o. male here for routine visit and annual H&P   HPI   There are no new problems and multiple health problems have been reviewed.   No significant hospitalization in the last 1 year, had cholecystectomy 1 year ago.  The course has been unchanged.  The patient  is moderately confused.   There are no family members present during time of visit.    Grzegorz is is sitting up in chair,, denies any complaints when asked.   No known recent seizures.  Eating and drinking well.   No concerns per staff.    No recent behaviors per staff.           Review of Systems   Constitutional:  Negative for chills, fatigue and fever.   Respiratory:  Negative for cough and shortness of breath.    Cardiovascular:  Negative for chest pain and palpitations.   Gastrointestinal:  Negative for abdominal pain, constipation, diarrhea, nausea and vomiting.   Genitourinary:  Negative for difficulty urinating.       Objective   /84   Pulse 82   Temp 36.4 °C (97.6 °F)   Resp 20   Wt 137 kg (301 lb)   SpO2 96%   BMI 36.64 kg/m²     Physical Exam  Constitutional:       General: He is not in acute distress.     Appearance: He is obese.   HENT:      Head: Normocephalic and atraumatic.     Eyes:      Conjunctiva/sclera: Conjunctivae normal.       Cardiovascular:      Rate and Rhythm: Normal rate and regular rhythm.   Pulmonary:      Effort: Pulmonary effort is normal. No respiratory distress.      Breath sounds: Normal breath sounds.   Abdominal:      General: Bowel sounds are normal. There is no distension.      Palpations: Abdomen is soft.      Tenderness: There is no abdominal tenderness.      Comments: Ostomy draining semi formed brown.   Abdominal incisions healed.    Genitourinary:     Comments: Suprapubic cath draining clear yellow    Musculoskeletal:         General: Normal range of motion.      Right lower leg: No edema.      Left lower leg: No edema.     Skin:     General: Skin is warm and dry.      Neurological:      General: No focal deficit present.      Mental Status: He is alert. Mental status is at baseline.     Psychiatric:         Mood and Affect: Mood normal.         Behavior: Behavior normal.         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           Physical debility    He is ambulating better around the facility.  staff to monitor and notify for any changes.             Seizure disorder (Multi)    No known recent seizures.    Staff to monitor and notify for any recurrence of seizure activity           Suprapubic catheter (Multi)    Functioning well         Vitamin D deficiency    monitor with routine labs.           Moderate dementia without behavioral disturbance, psychotic disturbance, mood disturbance, or anxiety, unspecified dementia type      He is sitting up in chair, states he is feeling well.  No recent behaviors per staff.         labs/meds/orders reviewed  staff to monitor and notify for any changes.  continue with supportive and restorative care  Due for labs, ordered.  A1C was completed 6/25

## 2025-08-15 ENCOUNTER — NURSING HOME VISIT (OUTPATIENT)
Dept: POST ACUTE CARE | Facility: EXTERNAL LOCATION | Age: 55
End: 2025-08-15
Payer: MEDICARE

## 2025-08-15 DIAGNOSIS — E55.9 VITAMIN D DEFICIENCY: ICD-10-CM

## 2025-08-15 DIAGNOSIS — R53.81 PHYSICAL DEBILITY: ICD-10-CM

## 2025-08-15 DIAGNOSIS — J30.9 ALLERGIC RHINITIS, UNSPECIFIED SEASONALITY, UNSPECIFIED TRIGGER: Primary | ICD-10-CM

## 2025-08-15 DIAGNOSIS — Z93.2 ILEOSTOMY IN PLACE (MULTI): ICD-10-CM

## 2025-08-15 DIAGNOSIS — G40.909 SEIZURE DISORDER (MULTI): ICD-10-CM

## 2025-08-15 DIAGNOSIS — K21.9 GASTROESOPHAGEAL REFLUX DISEASE WITHOUT ESOPHAGITIS: ICD-10-CM

## 2025-08-21 ENCOUNTER — NURSING HOME VISIT (OUTPATIENT)
Dept: POST ACUTE CARE | Facility: EXTERNAL LOCATION | Age: 55
End: 2025-08-21
Payer: MEDICARE

## 2025-08-21 VITALS
OXYGEN SATURATION: 97 % | SYSTOLIC BLOOD PRESSURE: 132 MMHG | TEMPERATURE: 98.2 F | HEART RATE: 70 BPM | WEIGHT: 301 LBS | RESPIRATION RATE: 20 BRPM | BODY MASS INDEX: 36.64 KG/M2 | DIASTOLIC BLOOD PRESSURE: 70 MMHG

## 2025-08-21 DIAGNOSIS — Z93.2 ILEOSTOMY IN PLACE (MULTI): Primary | ICD-10-CM
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
## 2025-08-21 DIAGNOSIS — K59.2 NEUROGENIC BOWEL: ICD-10-CM

## 2025-08-21 DIAGNOSIS — N31.9 NEUROGENIC BLADDER: ICD-10-CM

## 2025-08-21 DIAGNOSIS — G40.909 SEIZURE DISORDER (MULTI): ICD-10-CM

## 2025-08-21 PROCEDURE — 99309 SBSQ NF CARE MODERATE MDM 30: CPT | Performed by: NURSE PRACTITIONER

## 2025-08-21 ASSESSMENT — ENCOUNTER SYMPTOMS
CONSTIPATION: 0
NAUSEA: 0
PALPITATIONS: 0
COUGH: 0
CHILLS: 0
ABDOMINAL PAIN: 0
VOMITING: 0
FEVER: 0
DIARRHEA: 0
FATIGUE: 0
DIFFICULTY URINATING: 0
SHORTNESS OF BREATH: 0

## (undated) DEVICE — DRAIN, WOUND, FLAT, HUBLESS, FULL LENGTH PERFORATION, 10 MM X 20 CM, SILICONE

## (undated) DEVICE — RETRIEVAL SYSTEM, MONARCH, 10MM DISP ENDOSCOPIC

## (undated) DEVICE — SUTURE, POLYSORB 2-0, 21 IN, LIGATING LOOP

## (undated) DEVICE — TROCAR, OPTICAL, BLADELESS, 12MM, THREADED, 100MM LENGTH

## (undated) DEVICE — SPONGE, HEMOSTATIC, CELLULOSE, SURGICEL, NU-KNIT, 3 X 4 IN

## (undated) DEVICE — SHEARS, CURVED HARMONIC ACE 36CM

## (undated) DEVICE — TUBE SET, PNEUMOLAR HEATED, SMOKE EVACU, HIGH-FLOW

## (undated) DEVICE — EVACUATOR, WOUND, SUCTION, CLOSED, JACKSON-PRATT, 100 CC, SILICONE

## (undated) DEVICE — ASSEMBLY, STRYKER FLOW 2, SUCTION IRRIGATOR, WITH TIP

## (undated) DEVICE — GRASPER, LAPAROSCOPIC, DIRECT DRIVE, 5MM X35CM, DISP

## (undated) DEVICE — CARE KIT, LAPAROSCOPIC, ADVANCED

## (undated) DEVICE — 18MM, DEROYAL, LAPAROSCOPIC DISSECTOR, 5MM KITTNER TIP, 45CM SHAFT, STERILE

## (undated) DEVICE — SCISSORS, METZ, CURVED, 3/4 BLADE

## (undated) DEVICE — APPLIER, LIGACLIP ENDO ROTATING, MULTIPLE CLIP, 20 LG

## (undated) DEVICE — TROCAR SYSTEM, BALLOON, KII GELPORT, 12 X 100MM

## (undated) DEVICE — TROCAR, KII OPTICAL BLADELESS 5MM Z THREAD 100MM LNGTH

## (undated) DEVICE — CATHETER, LAPAROSCOPIC, CHOLANGIOGRAPHY

## (undated) DEVICE — SUTURE, POLYSORB 0, 21 IN, LIGATING LOOP